# Patient Record
Sex: MALE | Race: WHITE | Employment: UNEMPLOYED | URBAN - METROPOLITAN AREA
[De-identification: names, ages, dates, MRNs, and addresses within clinical notes are randomized per-mention and may not be internally consistent; named-entity substitution may affect disease eponyms.]

---

## 2017-01-31 ENCOUNTER — TELEPHONE (OUTPATIENT)
Dept: FAMILY MEDICINE CLINIC | Facility: CLINIC | Age: 47
End: 2017-01-31

## 2017-01-31 ENCOUNTER — OFFICE VISIT (OUTPATIENT)
Dept: FAMILY MEDICINE CLINIC | Facility: CLINIC | Age: 47
End: 2017-01-31

## 2017-01-31 VITALS
HEART RATE: 76 BPM | DIASTOLIC BLOOD PRESSURE: 85 MMHG | HEIGHT: 67 IN | WEIGHT: 235 LBS | RESPIRATION RATE: 20 BRPM | TEMPERATURE: 98 F | BODY MASS INDEX: 36.88 KG/M2 | SYSTOLIC BLOOD PRESSURE: 125 MMHG

## 2017-01-31 DIAGNOSIS — R07.89 OTHER CHEST PAIN: ICD-10-CM

## 2017-01-31 DIAGNOSIS — M25.511 BILATERAL SHOULDER PAIN, UNSPECIFIED CHRONICITY: Primary | ICD-10-CM

## 2017-01-31 DIAGNOSIS — M25.512 BILATERAL SHOULDER PAIN, UNSPECIFIED CHRONICITY: Primary | ICD-10-CM

## 2017-01-31 PROCEDURE — 99215 OFFICE O/P EST HI 40 MIN: CPT | Performed by: FAMILY MEDICINE

## 2017-01-31 PROCEDURE — 99212 OFFICE O/P EST SF 10 MIN: CPT | Performed by: FAMILY MEDICINE

## 2017-01-31 RX ORDER — ESCITALOPRAM OXALATE 10 MG/1
10 TABLET ORAL DAILY
Qty: 90 TABLET | Refills: 1 | Status: SHIPPED | OUTPATIENT
Start: 2017-01-31 | End: 2017-07-29

## 2017-02-01 NOTE — TELEPHONE ENCOUNTER
Please call him again. Needs to do stress test., order in the computer.  Tell him i spoke to dr Johana Menard

## 2017-02-01 NOTE — PROGRESS NOTES
HPI:    Patient ID: Yahir Bethea is a 48year old male. HPI Comments: Patient also complains about chest pressure when under stress. He still smokes,   He is also under a lot of stress.        Shoulder Pain   The pain is present in the right shoul (SIX) HOURS AS NEEDED FOR WHEEZING. Disp: 8.5 Inhaler Rfl: 1   LISINOPRIL-HYDROCHLOROTHIAZIDE 10-12.5 MG Oral Tab TAKE 1 TABLET BY MOUTH ONCE DAILY.  Disp: 90 tablet Rfl: 1   ASPIRIN 81 MG Oral Tab EC TAKE 1 TABLET BY MOUTH EVERY DAY Disp: 90 tablet Rfl: 1 INTERNAL  CARD NUC EXERCISE STRESS (REST/EXER) ([id number])       #[id number]

## 2017-02-03 ENCOUNTER — HOSPITAL ENCOUNTER (OUTPATIENT)
Dept: NUCLEAR MEDICINE | Facility: HOSPITAL | Age: 47
Discharge: HOME | End: 2017-02-03
Attending: FAMILY MEDICINE
Payer: COMMERCIAL

## 2017-02-03 ENCOUNTER — HOSPITAL ENCOUNTER (OUTPATIENT)
Dept: CV DIAGNOSTICS | Facility: HOSPITAL | Age: 47
Discharge: HOME | End: 2017-02-03
Attending: FAMILY MEDICINE
Payer: COMMERCIAL

## 2017-02-03 ENCOUNTER — TELEPHONE (OUTPATIENT)
Dept: PULMONOLOGY | Facility: CLINIC | Age: 47
End: 2017-02-03

## 2017-02-03 DIAGNOSIS — R07.89 OTHER CHEST PAIN: ICD-10-CM

## 2017-02-03 PROCEDURE — 93018 CV STRESS TEST I&R ONLY: CPT | Performed by: INTERNAL MEDICINE

## 2017-02-03 PROCEDURE — 93016 CV STRESS TEST SUPVJ ONLY: CPT | Performed by: INTERNAL MEDICINE

## 2017-02-03 PROCEDURE — 78452 HT MUSCLE IMAGE SPECT MULT: CPT

## 2017-02-03 PROCEDURE — 93017 CV STRESS TEST TRACING ONLY: CPT

## 2017-02-03 PROCEDURE — 93018 CV STRESS TEST I&R ONLY: CPT | Performed by: NUCLEAR MEDICINE

## 2017-02-03 RX ORDER — SODIUM CHLORIDE 9 MG/ML
INJECTION, SOLUTION INTRAVENOUS
Status: COMPLETED
Start: 2017-02-03 | End: 2017-02-03

## 2017-02-03 RX ADMIN — SODIUM CHLORIDE: 9 INJECTION, SOLUTION INTRAVENOUS at 08:39:00

## 2017-02-08 ENCOUNTER — TELEPHONE (OUTPATIENT)
Dept: FAMILY MEDICINE CLINIC | Facility: CLINIC | Age: 47
End: 2017-02-08

## 2017-02-08 NOTE — TELEPHONE ENCOUNTER
----- Message from Manju Sneed MD sent at 2/3/2017  1:02 PM CST -----  Results normal. Please call patient and send results.  Continue present management

## 2017-02-10 ENCOUNTER — HOSPITAL ENCOUNTER (OUTPATIENT)
Dept: GENERAL RADIOLOGY | Facility: HOSPITAL | Age: 47
Discharge: HOME | End: 2017-02-10
Attending: ORTHOPAEDIC SURGERY
Payer: COMMERCIAL

## 2017-02-10 ENCOUNTER — OFFICE VISIT (OUTPATIENT)
Dept: ORTHOPEDICS CLINIC | Facility: CLINIC | Age: 47
End: 2017-02-10

## 2017-02-10 DIAGNOSIS — M25.511 BILATERAL SHOULDER PAIN, UNSPECIFIED CHRONICITY: ICD-10-CM

## 2017-02-10 DIAGNOSIS — M54.2 NECK PAIN: ICD-10-CM

## 2017-02-10 DIAGNOSIS — M75.22 BICEPS TENDONITIS, LEFT: Primary | ICD-10-CM

## 2017-02-10 DIAGNOSIS — M25.512 BILATERAL SHOULDER PAIN, UNSPECIFIED CHRONICITY: ICD-10-CM

## 2017-02-10 DIAGNOSIS — M75.82 ROTATOR CUFF TENDONITIS, LEFT: ICD-10-CM

## 2017-02-10 PROCEDURE — 72040 X-RAY EXAM NECK SPINE 2-3 VW: CPT

## 2017-02-10 PROCEDURE — 99212 OFFICE O/P EST SF 10 MIN: CPT | Performed by: ORTHOPAEDIC SURGERY

## 2017-02-10 PROCEDURE — 99243 OFF/OP CNSLTJ NEW/EST LOW 30: CPT | Performed by: ORTHOPAEDIC SURGERY

## 2017-02-10 PROCEDURE — 73030 X-RAY EXAM OF SHOULDER: CPT

## 2017-02-10 NOTE — PROGRESS NOTES
2/10/2017  Robert Yin  34/1970  50year old   male  Letha Cruz MD    HPI:   Patient presents with:  Shoulder Pain: Bilateral, left worse- pt states pain has been ongoing for yrs, but most recently 1 month ago.  pt states he has pain at [facility] Disp: 90 tablet Rfl: 3   alprazolam 1 MG Oral Tab TAKE 1 TABLET BY MOUTH EVERY EVENING AS NEEDED FOR SLEEP Disp: 30 tablet Rfl: 2   Ascorbic Acid (VITAMIN C) 1000 MG Oral Tab Take 1,000 mg by mouth daily.  Disp:  Rfl:    omega-3 fatty acids 1000 MG Oral Cap pulses. Sensation is intact to light touch in axillary, median, ulnar, and radial nerve distributions. The patient has 5/5 strength in finger flexion, finger extension, EPL, FPL, and interosseous muscle function.   The patient has full forward elevation a

## 2017-02-15 RX ORDER — METOPROLOL TARTRATE 50 MG/1
TABLET, FILM COATED ORAL
Qty: 180 TABLET | Refills: 0 | Status: SHIPPED | OUTPATIENT
Start: 2017-02-15 | End: 2017-05-15

## 2017-02-22 ENCOUNTER — OFFICE VISIT (OUTPATIENT)
Dept: PHYSICAL THERAPY | Facility: HOSPITAL | Age: 47
End: 2017-02-22
Attending: ORTHOPAEDIC SURGERY
Payer: COMMERCIAL

## 2017-02-22 DIAGNOSIS — M75.82 ROTATOR CUFF TENDONITIS, LEFT: ICD-10-CM

## 2017-02-22 DIAGNOSIS — M75.22 BICEPS TENDONITIS, LEFT: Primary | ICD-10-CM

## 2017-02-22 PROCEDURE — 97110 THERAPEUTIC EXERCISES: CPT

## 2017-02-22 PROCEDURE — 97162 PT EVAL MOD COMPLEX 30 MIN: CPT

## 2017-02-22 NOTE — PROGRESS NOTES
UPPER EXTREMITY EVALUATION:   Referring Physician: Dr. Jacquelyn Wick  Date of Onset: 4-6 wks ago Date of Service: 2/22/2017   Associated DX:  Biceps tendonitis, left (M75.22)  Rotator cuff tendonitis, left (M75.82)    PATIENT SUMMARY:   Jeanne marin a Screen: B rot 80% WNL, ext 50% WNL, flex WNL, B SB 80% WNL. Palpation: significant TTP L ant shoulder supraspinatus/bicep tendons, posterior R/C, and pect major/minor. Scapulohumeral Rhythm: poor stabilization L scapula with all movements.      AROM: will increase shoulder AROM to 85 deg ER  To promote functional ADL. · Pt will increase shoulder AROM IR to 70 deg to be able to reach in back pocket, tuck in shirt, and turn steering wheel without pain.   · Pt will improve shoulder strength for ER and Ab

## 2017-02-28 ENCOUNTER — OFFICE VISIT (OUTPATIENT)
Dept: PHYSICAL THERAPY | Facility: HOSPITAL | Age: 47
End: 2017-02-28
Attending: ORTHOPAEDIC SURGERY
Payer: COMMERCIAL

## 2017-02-28 DIAGNOSIS — M75.82 ROTATOR CUFF TENDONITIS, LEFT: ICD-10-CM

## 2017-02-28 DIAGNOSIS — M75.22 BICEPS TENDONITIS, LEFT: Primary | ICD-10-CM

## 2017-02-28 PROCEDURE — 97110 THERAPEUTIC EXERCISES: CPT

## 2017-02-28 NOTE — PROGRESS NOTES
DX:  Biceps tendonitis, left (M75.22)  Rotator cuff tendonitis, left (M75.82)  Authorized # of Visits:  2/8 (jarocho 5/15/17)         Next MD visit: none scheduled  Fall Risk: standard         Precautions: n/a           Medication Changes since last visit?: N

## 2017-03-02 ENCOUNTER — APPOINTMENT (OUTPATIENT)
Dept: PHYSICAL THERAPY | Facility: HOSPITAL | Age: 47
End: 2017-03-02
Attending: ORTHOPAEDIC SURGERY
Payer: COMMERCIAL

## 2017-03-07 ENCOUNTER — APPOINTMENT (OUTPATIENT)
Dept: PHYSICAL THERAPY | Facility: HOSPITAL | Age: 47
End: 2017-03-07
Attending: ORTHOPAEDIC SURGERY
Payer: COMMERCIAL

## 2017-03-09 ENCOUNTER — APPOINTMENT (OUTPATIENT)
Dept: PHYSICAL THERAPY | Facility: HOSPITAL | Age: 47
End: 2017-03-09
Attending: ORTHOPAEDIC SURGERY
Payer: COMMERCIAL

## 2017-03-13 ENCOUNTER — APPOINTMENT (OUTPATIENT)
Dept: PHYSICAL THERAPY | Facility: HOSPITAL | Age: 47
End: 2017-03-13
Attending: ORTHOPAEDIC SURGERY
Payer: COMMERCIAL

## 2017-03-16 ENCOUNTER — APPOINTMENT (OUTPATIENT)
Dept: PHYSICAL THERAPY | Facility: HOSPITAL | Age: 47
End: 2017-03-16
Attending: ORTHOPAEDIC SURGERY
Payer: COMMERCIAL

## 2017-03-20 ENCOUNTER — APPOINTMENT (OUTPATIENT)
Dept: PHYSICAL THERAPY | Facility: HOSPITAL | Age: 47
End: 2017-03-20
Attending: ORTHOPAEDIC SURGERY
Payer: COMMERCIAL

## 2017-03-22 ENCOUNTER — APPOINTMENT (OUTPATIENT)
Dept: PHYSICAL THERAPY | Facility: HOSPITAL | Age: 47
End: 2017-03-22
Attending: ORTHOPAEDIC SURGERY
Payer: COMMERCIAL

## 2017-04-12 ENCOUNTER — TELEPHONE (OUTPATIENT)
Dept: CARDIOLOGY CLINIC | Facility: CLINIC | Age: 47
End: 2017-04-12

## 2017-04-12 NOTE — TELEPHONE ENCOUNTER
Pt needs surgical clearance for upcoming shoulder surgery at [address] [place] [address] - he had recent stress test done - Dr Nasima Nieto -phone  [phone]--wax [phone]

## 2017-04-18 NOTE — TELEPHONE ENCOUNTER
Pt is requesting a refill on PROAIR  (90 Base) MCG/ACT Inhalation Aero Soln  Pt will be going into surgery in the next week and is requesting this refill.     Current outpatient prescriptions:   •  PROAIR  (90 Base) MCG/ACT Inhalation Aero Sol

## 2017-04-19 RX ORDER — ALBUTEROL SULFATE 90 UG/1
2 AEROSOL, METERED RESPIRATORY (INHALATION) EVERY 6 HOURS PRN
Qty: 8.5 INHALER | Refills: 1 | Status: SHIPPED | OUTPATIENT
Start: 2017-04-19 | End: 2017-06-15

## 2017-04-27 RX ORDER — LISINOPRIL AND HYDROCHLOROTHIAZIDE 12.5; 1 MG/1; MG/1
TABLET ORAL
Qty: 90 TABLET | Refills: 0 | Status: SHIPPED | OUTPATIENT
Start: 2017-04-27 | End: 2017-07-23

## 2017-04-27 RX ORDER — ASPIRIN 81 MG/1
TABLET ORAL
Qty: 90 TABLET | Refills: 0 | Status: SHIPPED | OUTPATIENT
Start: 2017-04-27 | End: 2017-07-23

## 2017-04-27 NOTE — TELEPHONE ENCOUNTER
Hypertensive Medications  Protocol Criteria:  · Appointment scheduled in the past 6 months or in the next 3 months  · BMP or CMP in the past 12 months  · Creatinine result < 2  Recent Visits       Provider Department Primary Dx    2 months ago Jennifer Dockery

## 2017-05-02 ENCOUNTER — SURGERY (OUTPATIENT)
Age: 47
End: 2017-05-02

## 2017-05-02 ENCOUNTER — ANESTHESIA (OUTPATIENT)
Dept: SURGERY | Facility: HOSPITAL | Age: 47
End: 2017-05-02
Payer: OTHER MISCELLANEOUS

## 2017-05-02 ENCOUNTER — HOSPITAL ENCOUNTER (OUTPATIENT)
Facility: HOSPITAL | Age: 47
Setting detail: HOSPITAL OUTPATIENT SURGERY
Discharge: HOME | End: 2017-05-02
Attending: ORTHOPAEDIC SURGERY | Admitting: ORTHOPAEDIC SURGERY
Payer: OTHER MISCELLANEOUS

## 2017-05-02 ENCOUNTER — ANESTHESIA EVENT (OUTPATIENT)
Dept: SURGERY | Facility: HOSPITAL | Age: 47
End: 2017-05-02
Payer: OTHER MISCELLANEOUS

## 2017-05-02 VITALS
RESPIRATION RATE: 20 BRPM | SYSTOLIC BLOOD PRESSURE: 145 MMHG | TEMPERATURE: 97 F | WEIGHT: 244 LBS | HEART RATE: 71 BPM | HEIGHT: 67 IN | OXYGEN SATURATION: 91 % | DIASTOLIC BLOOD PRESSURE: 71 MMHG | BODY MASS INDEX: 38.3 KG/M2

## 2017-05-02 DIAGNOSIS — M75.122 COMPLETE TEAR OF LEFT ROTATOR CUFF: Primary | ICD-10-CM

## 2017-05-02 PROCEDURE — 64415 NJX AA&/STRD BRCH PLXS IMG: CPT | Performed by: ORTHOPAEDIC SURGERY

## 2017-05-02 PROCEDURE — 3E0T3BZ INTRODUCTION OF ANESTHETIC AGENT INTO PERIPHERAL NERVES AND PLEXI, PERCUTANEOUS APPROACH: ICD-10-PCS | Performed by: ANESTHESIOLOGY

## 2017-05-02 PROCEDURE — 0RNK4ZZ RELEASE LEFT SHOULDER JOINT, PERCUTANEOUS ENDOSCOPIC APPROACH: ICD-10-PCS | Performed by: ORTHOPAEDIC SURGERY

## 2017-05-02 PROCEDURE — 0RBK4ZZ EXCISION OF LEFT SHOULDER JOINT, PERCUTANEOUS ENDOSCOPIC APPROACH: ICD-10-PCS | Performed by: ORTHOPAEDIC SURGERY

## 2017-05-02 PROCEDURE — 0LQ24ZZ REPAIR LEFT SHOULDER TENDON, PERCUTANEOUS ENDOSCOPIC APPROACH: ICD-10-PCS | Performed by: ORTHOPAEDIC SURGERY

## 2017-05-02 PROCEDURE — 99152 MOD SED SAME PHYS/QHP 5/>YRS: CPT | Performed by: ORTHOPAEDIC SURGERY

## 2017-05-02 DEVICE — ANCHOR CORK METAL AR-1928SF-45: Type: IMPLANTABLE DEVICE | Site: SHOULDER | Status: FUNCTIONAL

## 2017-05-02 RX ORDER — METOCLOPRAMIDE 10 MG/1
10 TABLET ORAL ONCE
Status: DISCONTINUED | OUTPATIENT
Start: 2017-05-02 | End: 2017-05-02 | Stop reason: HOSPADM

## 2017-05-02 RX ORDER — LIDOCAINE HYDROCHLORIDE 10 MG/ML
INJECTION, SOLUTION EPIDURAL; INFILTRATION; INTRACAUDAL; PERINEURAL AS NEEDED
Status: DISCONTINUED | OUTPATIENT
Start: 2017-05-02 | End: 2017-05-02 | Stop reason: SURG

## 2017-05-02 RX ORDER — ROPIVACAINE HYDROCHLORIDE 5 MG/ML
INJECTION, SOLUTION EPIDURAL; INFILTRATION; PERINEURAL AS NEEDED
Status: DISCONTINUED | OUTPATIENT
Start: 2017-05-02 | End: 2017-05-02 | Stop reason: SURG

## 2017-05-02 RX ORDER — MORPHINE SULFATE 4 MG/ML
4 INJECTION, SOLUTION INTRAMUSCULAR; INTRAVENOUS EVERY 10 MIN PRN
Status: DISCONTINUED | OUTPATIENT
Start: 2017-05-02 | End: 2017-05-02

## 2017-05-02 RX ORDER — ROCURONIUM BROMIDE 10 MG/ML
INJECTION, SOLUTION INTRAVENOUS AS NEEDED
Status: DISCONTINUED | OUTPATIENT
Start: 2017-05-02 | End: 2017-05-02 | Stop reason: SURG

## 2017-05-02 RX ORDER — HYDROCODONE BITARTRATE AND ACETAMINOPHEN 5; 325 MG/1; MG/1
2 TABLET ORAL AS NEEDED
Status: DISCONTINUED | OUTPATIENT
Start: 2017-05-02 | End: 2017-05-02

## 2017-05-02 RX ORDER — HYDROMORPHONE HYDROCHLORIDE 1 MG/ML
0.4 INJECTION, SOLUTION INTRAMUSCULAR; INTRAVENOUS; SUBCUTANEOUS EVERY 5 MIN PRN
Status: DISCONTINUED | OUTPATIENT
Start: 2017-05-02 | End: 2017-05-02

## 2017-05-02 RX ORDER — GLYCOPYRROLATE 0.2 MG/ML
INJECTION INTRAMUSCULAR; INTRAVENOUS AS NEEDED
Status: DISCONTINUED | OUTPATIENT
Start: 2017-05-02 | End: 2017-05-02 | Stop reason: SURG

## 2017-05-02 RX ORDER — FAMOTIDINE 20 MG/1
20 TABLET ORAL ONCE
Status: COMPLETED | OUTPATIENT
Start: 2017-05-02 | End: 2017-05-02

## 2017-05-02 RX ORDER — SUCCINYLCHOLINE CHLORIDE 20 MG/ML
INJECTION INTRAMUSCULAR; INTRAVENOUS AS NEEDED
Status: DISCONTINUED | OUTPATIENT
Start: 2017-05-02 | End: 2017-05-02 | Stop reason: SURG

## 2017-05-02 RX ORDER — HYDROMORPHONE HYDROCHLORIDE 1 MG/ML
0.2 INJECTION, SOLUTION INTRAMUSCULAR; INTRAVENOUS; SUBCUTANEOUS EVERY 5 MIN PRN
Status: DISCONTINUED | OUTPATIENT
Start: 2017-05-02 | End: 2017-05-02

## 2017-05-02 RX ORDER — SODIUM CHLORIDE, SODIUM LACTATE, POTASSIUM CHLORIDE, CALCIUM CHLORIDE 600; 310; 30; 20 MG/100ML; MG/100ML; MG/100ML; MG/100ML
INJECTION, SOLUTION INTRAVENOUS CONTINUOUS
Status: DISCONTINUED | OUTPATIENT
Start: 2017-05-02 | End: 2017-05-02

## 2017-05-02 RX ORDER — ACETAMINOPHEN 325 MG/1
650 TABLET ORAL ONCE
Status: COMPLETED | OUTPATIENT
Start: 2017-05-02 | End: 2017-05-02

## 2017-05-02 RX ORDER — SODIUM CHLORIDE, SODIUM LACTATE, POTASSIUM CHLORIDE, CALCIUM CHLORIDE 600; 310; 30; 20 MG/100ML; MG/100ML; MG/100ML; MG/100ML
INJECTION, SOLUTION INTRAVENOUS CONTINUOUS PRN
Status: DISCONTINUED | OUTPATIENT
Start: 2017-05-02 | End: 2017-05-02 | Stop reason: SURG

## 2017-05-02 RX ORDER — OXYCODONE HYDROCHLORIDE AND ACETAMINOPHEN 5; 325 MG/1; MG/1
1 TABLET ORAL EVERY 4 HOURS PRN
Status: DISCONTINUED | OUTPATIENT
Start: 2017-05-02 | End: 2017-05-02

## 2017-05-02 RX ORDER — MORPHINE SULFATE 10 MG/ML
6 INJECTION, SOLUTION INTRAMUSCULAR; INTRAVENOUS EVERY 10 MIN PRN
Status: DISCONTINUED | OUTPATIENT
Start: 2017-05-02 | End: 2017-05-02

## 2017-05-02 RX ORDER — ONDANSETRON 2 MG/ML
4 INJECTION INTRAMUSCULAR; INTRAVENOUS ONCE AS NEEDED
Status: DISCONTINUED | OUTPATIENT
Start: 2017-05-02 | End: 2017-05-02

## 2017-05-02 RX ORDER — ONDANSETRON 2 MG/ML
INJECTION INTRAMUSCULAR; INTRAVENOUS AS NEEDED
Status: DISCONTINUED | OUTPATIENT
Start: 2017-05-02 | End: 2017-05-02 | Stop reason: SURG

## 2017-05-02 RX ORDER — LABETALOL HYDROCHLORIDE 5 MG/ML
INJECTION, SOLUTION INTRAVENOUS AS NEEDED
Status: DISCONTINUED | OUTPATIENT
Start: 2017-05-02 | End: 2017-05-02 | Stop reason: SURG

## 2017-05-02 RX ORDER — DEXAMETHASONE SODIUM PHOSPHATE 4 MG/ML
VIAL (ML) INJECTION AS NEEDED
Status: DISCONTINUED | OUTPATIENT
Start: 2017-05-02 | End: 2017-05-02 | Stop reason: SURG

## 2017-05-02 RX ORDER — NEOSTIGMINE METHYLSULFATE 0.5 MG/ML
INJECTION INTRAVENOUS AS NEEDED
Status: DISCONTINUED | OUTPATIENT
Start: 2017-05-02 | End: 2017-05-02 | Stop reason: SURG

## 2017-05-02 RX ORDER — METOPROLOL TARTRATE 5 MG/5ML
2.5 INJECTION INTRAVENOUS ONCE
Status: DISCONTINUED | OUTPATIENT
Start: 2017-05-02 | End: 2017-05-02

## 2017-05-02 RX ORDER — HYDROCODONE BITARTRATE AND ACETAMINOPHEN 5; 325 MG/1; MG/1
1 TABLET ORAL AS NEEDED
Status: DISCONTINUED | OUTPATIENT
Start: 2017-05-02 | End: 2017-05-02

## 2017-05-02 RX ORDER — HYDROMORPHONE HYDROCHLORIDE 1 MG/ML
0.6 INJECTION, SOLUTION INTRAMUSCULAR; INTRAVENOUS; SUBCUTANEOUS EVERY 5 MIN PRN
Status: DISCONTINUED | OUTPATIENT
Start: 2017-05-02 | End: 2017-05-02

## 2017-05-02 RX ORDER — MORPHINE SULFATE 2 MG/ML
2 INJECTION, SOLUTION INTRAMUSCULAR; INTRAVENOUS EVERY 10 MIN PRN
Status: DISCONTINUED | OUTPATIENT
Start: 2017-05-02 | End: 2017-05-02

## 2017-05-02 RX ORDER — NALOXONE HYDROCHLORIDE 0.4 MG/ML
80 INJECTION, SOLUTION INTRAMUSCULAR; INTRAVENOUS; SUBCUTANEOUS AS NEEDED
Status: DISCONTINUED | OUTPATIENT
Start: 2017-05-02 | End: 2017-05-02

## 2017-05-02 RX ADMIN — GLYCOPYRROLATE 0.6 MG: 0.2 INJECTION INTRAMUSCULAR; INTRAVENOUS at 14:05:00

## 2017-05-02 RX ADMIN — ONDANSETRON 4 MG: 2 INJECTION INTRAMUSCULAR; INTRAVENOUS at 14:05:00

## 2017-05-02 RX ADMIN — LIDOCAINE HYDROCHLORIDE 50 MG: 10 INJECTION, SOLUTION EPIDURAL; INFILTRATION; INTRACAUDAL; PERINEURAL at 13:20:00

## 2017-05-02 RX ADMIN — ROCURONIUM BROMIDE 25 MG: 10 INJECTION, SOLUTION INTRAVENOUS at 13:30:00

## 2017-05-02 RX ADMIN — ROPIVACAINE HYDROCHLORIDE 30 ML: 5 INJECTION, SOLUTION EPIDURAL; INFILTRATION; PERINEURAL at 12:22:00

## 2017-05-02 RX ADMIN — ROCURONIUM BROMIDE 5 MG: 10 INJECTION, SOLUTION INTRAVENOUS at 13:20:00

## 2017-05-02 RX ADMIN — LABETALOL HYDROCHLORIDE 5 MG: 5 INJECTION, SOLUTION INTRAVENOUS at 13:55:00

## 2017-05-02 RX ADMIN — DEXAMETHASONE SODIUM PHOSPHATE 4 MG: 4 MG/ML VIAL (ML) INJECTION at 13:34:00

## 2017-05-02 RX ADMIN — SUCCINYLCHOLINE CHLORIDE 160 MG: 20 INJECTION INTRAMUSCULAR; INTRAVENOUS at 13:20:00

## 2017-05-02 RX ADMIN — SODIUM CHLORIDE, SODIUM LACTATE, POTASSIUM CHLORIDE, CALCIUM CHLORIDE: 600; 310; 30; 20 INJECTION, SOLUTION INTRAVENOUS at 14:10:00

## 2017-05-02 RX ADMIN — LABETALOL HYDROCHLORIDE 5 MG: 5 INJECTION, SOLUTION INTRAVENOUS at 13:45:00

## 2017-05-02 RX ADMIN — SODIUM CHLORIDE, SODIUM LACTATE, POTASSIUM CHLORIDE, CALCIUM CHLORIDE: 600; 310; 30; 20 INJECTION, SOLUTION INTRAVENOUS at 13:45:00

## 2017-05-02 RX ADMIN — SODIUM CHLORIDE, SODIUM LACTATE, POTASSIUM CHLORIDE, CALCIUM CHLORIDE: 600; 310; 30; 20 INJECTION, SOLUTION INTRAVENOUS at 12:22:00

## 2017-05-02 RX ADMIN — LIDOCAINE HYDROCHLORIDE 5 ML: 10 INJECTION, SOLUTION EPIDURAL; INFILTRATION; INTRACAUDAL; PERINEURAL at 12:22:00

## 2017-05-02 RX ADMIN — NEOSTIGMINE METHYLSULFATE 4 MG: 0.5 INJECTION INTRAVENOUS at 14:05:00

## 2017-05-02 NOTE — ANESTHESIA POSTPROCEDURE EVALUATION
Patient: Janak Goodrich    Procedure Summary     Date Anesthesia Start Anesthesia Stop Room / Location    05/02/17 1318  [facility] OR 03 / [facility] OR       Procedure Diagnosis Surgeon Responsible Provider    SHOULDER ARTHROSCOPY ROTATOR CUFF REPAIR (Lef

## 2017-05-02 NOTE — ANESTHESIA PREPROCEDURE EVALUATION
Anesthesia PreOp Note    HPI:     Haritha Milner is a 50year old male who presents for preoperative consultation requested by:  Leonardo Obrien MD    Date of Surgery: 5/2/2017    Procedure(s):  SHOULDER ARTHROSCOPY ROTATOR CUFF REPAIR  Indication: Left shoul mouth daily. Disp: 90 tablet Rfl: 1 4/30/2017 at Unknown time   Atorvastatin Calcium 80 MG Oral Tab Take 1 tablet (80 mg total) by mouth once daily.  Disp: 90 tablet Rfl: 3 5/1/2017 at 2100   alprazolam 1 MG Oral Tab TAKE 1 TABLET BY MOUTH EVERY EVENING AS Narrative       Available pre-op labs reviewed. Vital Signs: Body mass index is 38.21 kg/(m^2). height is 5' 7\" and weight is 244 lb. His oral temperature is 98.6 °F (37 °C). His blood pressure is 130/69 and his pulse is 66.  His respiration

## 2017-05-02 NOTE — H&P
[address] Patient Status:  [facility] Outpatient Surgery    1970 MRN [id number]   Location [facility] UNIT Attending Cristian Montano MD   Hosp Day # 0 MOIRA Morgan (10 mg total) by mouth daily. Disp: 90 tablet Rfl: 1 4/30/2017 at Unknown time   Atorvastatin Calcium 80 MG Oral Tab Take 1 tablet (80 mg total) by mouth once daily.  Disp: 90 tablet Rfl: 3 5/1/2017 at 2100   alprazolam 1 MG Oral Tab TAKE 1 TABLET BY MOUTH

## 2017-05-02 NOTE — ANESTHESIA PROCEDURE NOTES
Peripheral Block    Anesthesiologist:  Zunilda Bland  Patient Location:  PACU  Start Time:  5/2/2017 12:22 PM  End Time:  5/2/2017 12:25 PM  Site Identification: nerve stimulator and surface landmarks    Reason for Block: post-op pain management    Prea

## 2017-05-02 NOTE — BRIEF OP NOTE
Pre-Operative Diagnosis: Left shoulder rotator cuff tear     Post-Operative Diagnosis: Left shoulder rotator cuff, Impingement, Labral tearing, synovitis, biceps tendonitis     Procedure Performed:   Procedure(s):  Left shoulder arthroscopy rotator cuff

## 2017-05-04 NOTE — OPERATIVE REPORT
[facility]    PATIENT'S NAME: Arthur Benitez   ATTENDING PHYSICIAN: [address] Sherri Gonzalez MD   OPERATING PHYSICIAN: Chavo Gordon.  Sherri Gonzalez MD   PATIENT ACCOUNT#:   [id number]    LOCATION:  [facility] 10  MEDICAL RECORD #:   [id number]       DATE OF BIRTH and I confirmed that the left shoulder was the operative site and the left shoulder was marked accordingly. IV antibiotics were administered to the patient for prophylactic purposes.   An interscalene block was obtained by the anesthesiologist without diff decided to perform a tenotomy as this was a potential pain generator. Using an ArthroCare instrument, we released the proximal biceps tendon from the glenoid. We debrided the stump until all nonviable tissue was excised.     Next, the arthroscope was intr 05:22:40  [facility] [id number]  KCT/

## 2017-05-10 ENCOUNTER — TELEPHONE (OUTPATIENT)
Dept: FAMILY MEDICINE CLINIC | Facility: CLINIC | Age: 47
End: 2017-05-10

## 2017-05-10 DIAGNOSIS — G47.30 SLEEP APNEA, UNSPECIFIED TYPE: Primary | ICD-10-CM

## 2017-05-18 ENCOUNTER — TELEPHONE (OUTPATIENT)
Dept: FAMILY MEDICINE CLINIC | Facility: CLINIC | Age: 47
End: 2017-05-18

## 2017-05-18 RX ORDER — METOPROLOL TARTRATE 50 MG/1
TABLET, FILM COATED ORAL
Qty: 180 TABLET | Refills: 0 | Status: SHIPPED | OUTPATIENT
Start: 2017-05-18 | End: 2017-08-17

## 2017-05-18 RX ORDER — ALPRAZOLAM 1 MG/1
TABLET ORAL
Qty: 30 TABLET | Refills: 2 | Status: SHIPPED | OUTPATIENT
Start: 2017-05-18 | End: 2017-05-22

## 2017-05-18 NOTE — TELEPHONE ENCOUNTER
Pt calling regarding wanting to let Dr. Steve Abdalla know that had surgery on his rotating cup and is doing physical therapy for workers comp. Chante Rodriguez please advise

## 2017-05-18 NOTE — TELEPHONE ENCOUNTER
Pt following up on refill request,  Also pt is requesting alprazolam 1 MG Oral Tab      Current Outpatient Prescriptions:  alprazolam 1 MG Oral Tab TAKE 1 TABLET BY MOUTH EVERY EVENING AS NEEDED FOR SLEEP Disp: 30 tablet Rfl: 2

## 2017-05-18 NOTE — TELEPHONE ENCOUNTER
Pt called in just as an FYI (you don't need to do anything) he had surgery on his rotator cuff and is in PT.  I spoke with patient, he is doing fine and he confirmed he just wanted to tell you

## 2017-05-22 RX ORDER — ALPRAZOLAM 1 MG/1
TABLET ORAL
Qty: 30 TABLET | Refills: 1 | Status: SHIPPED | OUTPATIENT
Start: 2017-05-22 | End: 2018-03-20

## 2017-06-14 ENCOUNTER — TELEPHONE (OUTPATIENT)
Dept: FAMILY MEDICINE CLINIC | Facility: CLINIC | Age: 47
End: 2017-06-14

## 2017-06-14 DIAGNOSIS — G47.33 OBSTRUCTIVE SLEEP APNEA: Primary | ICD-10-CM

## 2017-06-14 NOTE — TELEPHONE ENCOUNTER
Pt called in stating E has been faxing Dr. Howard Lezama office for the last 2 weeks trying to obtain a referral to replace pt's cpap supplies (face mask, hose, filters, etc). Pt asking if a referral can be generated and sent to E, please?   Pt requesting a ca

## 2017-06-14 NOTE — TELEPHONE ENCOUNTER
Pt called in requesting a refill on his inhaler, please. Current outpatient prescriptions:     •  Albuterol Sulfate HFA (PROAIR HFA) 108 (90 Base) MCG/ACT Inhalation Aero Soln, Inhale 2 puffs into the lungs every 6 (six) hours as needed.  For wheezing, D

## 2017-06-15 ENCOUNTER — TELEPHONE (OUTPATIENT)
Dept: FAMILY MEDICINE CLINIC | Facility: CLINIC | Age: 47
End: 2017-06-15

## 2017-06-16 RX ORDER — ALBUTEROL SULFATE 90 UG/1
2 AEROSOL, METERED RESPIRATORY (INHALATION) EVERY 6 HOURS PRN
Qty: 8.5 INHALER | Refills: 1 | Status: SHIPPED | OUTPATIENT
Start: 2017-06-16 | End: 2017-10-29

## 2017-06-21 NOTE — TELEPHONE ENCOUNTER
Mukund Pimentel from [address] [place] [address] called in to follow up on referral for cpap supplies. Mukund Pimentel supplied diagnosis codes: [place], [id number], [id number], [id number], [id number] & .   Fax # [phone]

## 2017-07-03 NOTE — TELEPHONE ENCOUNTER
Pt calling checking status of referral, pt states he has a whole in hose and need referral ASAP. Pt states he needs referral ASAP. Pt requesting call when referral approved.

## 2017-07-05 NOTE — TELEPHONE ENCOUNTER
Referral was sent over 7/3 to [facility] as a routine referral. Our department was closed on 7/4. It was sent over urgently to  [facility] for authorization  today 7/5/17.     Thank You

## 2017-07-25 RX ORDER — ASPIRIN 81 MG/1
TABLET ORAL
Qty: 90 TABLET | Refills: 0 | Status: SHIPPED | OUTPATIENT
Start: 2017-07-25 | End: 2017-10-26

## 2017-07-25 RX ORDER — LISINOPRIL AND HYDROCHLOROTHIAZIDE 12.5; 1 MG/1; MG/1
TABLET ORAL
Qty: 90 TABLET | Refills: 0 | Status: SHIPPED | OUTPATIENT
Start: 2017-07-25 | End: 2017-10-26

## 2017-07-25 NOTE — TELEPHONE ENCOUNTER
Refill protocol failed because the patient did not meet the protocol criteria.  Please advise in regards to refill request     Hypertensive Medications  Protocol Criteria:  · Appointment scheduled in the past 6 months or in the next 3 months  · BMP or CMP i Office Visit    5 months ago Biceps tendonitis, left    [facility] for Sully Collins MD    Office Visit    5 months ago Bilateral shoulder pain, unspecified chronicity    [facility], [facility] [place],

## 2017-07-31 RX ORDER — ESCITALOPRAM OXALATE 10 MG/1
10 TABLET ORAL DAILY
Qty: 90 TABLET | Refills: 1 | Status: SHIPPED | OUTPATIENT
Start: 2017-07-31 | End: 2018-01-27

## 2017-08-15 ENCOUNTER — OFFICE VISIT (OUTPATIENT)
Dept: FAMILY MEDICINE CLINIC | Facility: CLINIC | Age: 47
End: 2017-08-15

## 2017-08-15 VITALS
SYSTOLIC BLOOD PRESSURE: 139 MMHG | WEIGHT: 245 LBS | TEMPERATURE: 98 F | DIASTOLIC BLOOD PRESSURE: 82 MMHG | HEART RATE: 67 BPM | BODY MASS INDEX: 38 KG/M2

## 2017-08-15 DIAGNOSIS — E66.3 OVERWEIGHT: ICD-10-CM

## 2017-08-15 DIAGNOSIS — I51.9 HEART DISEASE: Primary | ICD-10-CM

## 2017-08-15 PROCEDURE — 99214 OFFICE O/P EST MOD 30 MIN: CPT | Performed by: FAMILY MEDICINE

## 2017-08-15 PROCEDURE — 99212 OFFICE O/P EST SF 10 MIN: CPT | Performed by: FAMILY MEDICINE

## 2017-08-15 RX ORDER — BUPROPION HYDROCHLORIDE 150 MG/1
TABLET, EXTENDED RELEASE ORAL
Qty: 60 TABLET | Refills: 2 | Status: SHIPPED | OUTPATIENT
Start: 2017-08-15 | End: 2017-11-30

## 2017-08-15 NOTE — PROGRESS NOTES
HPI:    Patient ID: Yahir Bethea is a 50year old male. Fatigue, sweating more, was not working for last 4 months. Gained weight. No chest pain, no short of breath. Does not feel motivated. This all since shoulder surgery.  Doing pysical the 1,000 mg by mouth daily. Disp:  Rfl:    omega-3 fatty acids 1000 MG Oral Cap Take 1,000 mg by mouth daily. Disp:  Rfl:    Multiple Vitamin (MULTI-VITAMINS) Oral Tab Take  by mouth.  Disp:  Rfl:      Allergies:  Neosporin [Neomycin*    Rash   PHYSICAL EXAM:

## 2017-08-18 ENCOUNTER — LAB ENCOUNTER (OUTPATIENT)
Dept: LAB | Age: 47
End: 2017-08-18
Attending: FAMILY MEDICINE
Payer: COMMERCIAL

## 2017-08-18 DIAGNOSIS — I51.9 HEART DISEASE: ICD-10-CM

## 2017-08-18 DIAGNOSIS — E66.3 OVERWEIGHT: ICD-10-CM

## 2017-08-18 LAB
ALBUMIN SERPL BCP-MCNC: 4.3 G/DL (ref 3.5–4.8)
ALBUMIN/GLOB SERPL: 1.7 {RATIO} (ref 1–2)
ALP SERPL-CCNC: 100 U/L (ref 32–100)
ALT SERPL-CCNC: 33 U/L (ref 17–63)
ANION GAP SERPL CALC-SCNC: 8 MMOL/L (ref 0–18)
AST SERPL-CCNC: 22 U/L (ref 15–41)
BASOPHILS # BLD: 0 K/UL (ref 0–0.2)
BASOPHILS NFR BLD: 1 %
BILIRUB SERPL-MCNC: 0.7 MG/DL (ref 0.3–1.2)
BUN SERPL-MCNC: 18 MG/DL (ref 8–20)
BUN/CREAT SERPL: 18.8 (ref 10–20)
CALCIUM SERPL-MCNC: 9.7 MG/DL (ref 8.5–10.5)
CHLORIDE SERPL-SCNC: 100 MMOL/L (ref 95–110)
CHOLEST SERPL-MCNC: 141 MG/DL (ref 110–200)
CO2 SERPL-SCNC: 29 MMOL/L (ref 22–32)
CREAT SERPL-MCNC: 0.96 MG/DL (ref 0.5–1.5)
EOSINOPHIL # BLD: 0.4 K/UL (ref 0–0.7)
EOSINOPHIL NFR BLD: 5 %
ERYTHROCYTE [DISTWIDTH] IN BLOOD BY AUTOMATED COUNT: 14.1 % (ref 11–15)
GLOBULIN PLAS-MCNC: 2.5 G/DL (ref 2.5–3.7)
GLUCOSE SERPL-MCNC: 97 MG/DL (ref 70–99)
HBA1C MFR BLD: 5.7 % (ref 4–6)
HCT VFR BLD AUTO: 43.6 % (ref 41–52)
HDLC SERPL-MCNC: 39 MG/DL
HGB BLD-MCNC: 15.1 G/DL (ref 13.5–17.5)
LDLC SERPL CALC-MCNC: 25 MG/DL (ref 0–99)
LYMPHOCYTES # BLD: 2.5 K/UL (ref 1–4)
LYMPHOCYTES NFR BLD: 26 %
MCH RBC QN AUTO: 30.3 PG (ref 27–32)
MCHC RBC AUTO-ENTMCNC: 34.7 G/DL (ref 32–37)
MCV RBC AUTO: 87.3 FL (ref 80–100)
MONOCYTES # BLD: 0.8 K/UL (ref 0–1)
MONOCYTES NFR BLD: 8 %
NEUTROPHILS # BLD AUTO: 5.9 K/UL (ref 1.8–7.7)
NEUTROPHILS NFR BLD: 61 %
NONHDLC SERPL-MCNC: 102 MG/DL
OSMOLALITY UR CALC.SUM OF ELEC: 286 MOSM/KG (ref 275–295)
PLATELET # BLD AUTO: 183 K/UL (ref 140–400)
PMV BLD AUTO: 8.4 FL (ref 7.4–10.3)
POTASSIUM SERPL-SCNC: 3.9 MMOL/L (ref 3.3–5.1)
PROT SERPL-MCNC: 6.8 G/DL (ref 5.9–8.4)
RBC # BLD AUTO: 4.99 M/UL (ref 4.5–5.9)
SODIUM SERPL-SCNC: 137 MMOL/L (ref 136–144)
TRIGL SERPL-MCNC: 387 MG/DL (ref 1–149)
WBC # BLD AUTO: 9.7 K/UL (ref 4–11)

## 2017-08-18 PROCEDURE — 87086 URINE CULTURE/COLONY COUNT: CPT

## 2017-08-18 PROCEDURE — 80061 LIPID PANEL: CPT

## 2017-08-18 PROCEDURE — 83036 HEMOGLOBIN GLYCOSYLATED A1C: CPT

## 2017-08-18 PROCEDURE — 80053 COMPREHEN METABOLIC PANEL: CPT

## 2017-08-18 PROCEDURE — 36415 COLL VENOUS BLD VENIPUNCTURE: CPT

## 2017-08-18 PROCEDURE — 85025 COMPLETE CBC W/AUTO DIFF WBC: CPT

## 2017-08-18 RX ORDER — METOPROLOL TARTRATE 50 MG/1
TABLET, FILM COATED ORAL
Qty: 180 TABLET | Refills: 0 | Status: SHIPPED | OUTPATIENT
Start: 2017-08-18 | End: 2017-11-20

## 2017-08-18 NOTE — TELEPHONE ENCOUNTER
Refilled x1 per protocol.    Hypertensive Medications  Protocol Criteria:  · Appointment scheduled in the past 6 months or in the next 3 months  · BMP or CMP in the past 12 months  · Creatinine result < 2  Recent Outpatient Visits            3 days ago Hear

## 2017-10-26 ENCOUNTER — TELEPHONE (OUTPATIENT)
Dept: CARDIOLOGY CLINIC | Facility: CLINIC | Age: 47
End: 2017-10-26

## 2017-10-26 RX ORDER — ATORVASTATIN CALCIUM 80 MG/1
80 TABLET, FILM COATED ORAL
Qty: 90 TABLET | Refills: 0 | Status: SHIPPED | OUTPATIENT
Start: 2017-10-26 | End: 2018-01-27

## 2017-10-26 NOTE — TELEPHONE ENCOUNTER
Atorvastatin 80mg tabs, take 1 tab by mouth every day, qty 90    Current Outpatient Prescriptions:   •  Atorvastatin Calcium 80 MG Oral Tab, Take 1 tablet (80 mg total) by mouth once daily. , Disp: 90 tablet, Rfl: 3

## 2017-10-28 NOTE — TELEPHONE ENCOUNTER
Hypertensive Medications  Protocol Criteria:  · Appointment scheduled in the past 6 months or in the next 3 months  · BMP or CMP in the past 12 months  · Creatinine result < 2  Recent Outpatient Visits            2 months ago Heart disease    Trisha Chao MD [facility]    Office Visit    9 months ago Bilateral shoulder pain, unspecified chronicity    Cheryle Go, MD    Office Visit          No future appointments. Unable to refill per protocol due for ov.  .  pls advi

## 2017-11-01 RX ORDER — LISINOPRIL AND HYDROCHLOROTHIAZIDE 12.5; 1 MG/1; MG/1
TABLET ORAL
Qty: 90 TABLET | Refills: 0 | Status: SHIPPED | OUTPATIENT
Start: 2017-11-01 | End: 2018-01-27

## 2017-11-01 RX ORDER — ASPIRIN 81 MG/1
TABLET ORAL
Qty: 90 TABLET | Refills: 0 | Status: SHIPPED | OUTPATIENT
Start: 2017-11-01 | End: 2018-01-27

## 2017-11-21 RX ORDER — METOPROLOL TARTRATE 50 MG/1
TABLET, FILM COATED ORAL
Qty: 180 TABLET | Refills: 0 | Status: SHIPPED | OUTPATIENT
Start: 2017-11-21 | End: 2018-02-01

## 2017-11-30 ENCOUNTER — OFFICE VISIT (OUTPATIENT)
Dept: CARDIOLOGY CLINIC | Facility: CLINIC | Age: 47
End: 2017-11-30

## 2017-11-30 VITALS
SYSTOLIC BLOOD PRESSURE: 116 MMHG | BODY MASS INDEX: 39.39 KG/M2 | HEART RATE: 76 BPM | HEIGHT: 67 IN | DIASTOLIC BLOOD PRESSURE: 60 MMHG | RESPIRATION RATE: 18 BRPM | WEIGHT: 251 LBS

## 2017-11-30 DIAGNOSIS — E78.1 HYPERTRIGLYCERIDEMIA: ICD-10-CM

## 2017-11-30 DIAGNOSIS — I25.10 CORONARY ARTERY DISEASE INVOLVING NATIVE CORONARY ARTERY OF NATIVE HEART WITHOUT ANGINA PECTORIS: Primary | Chronic | ICD-10-CM

## 2017-11-30 DIAGNOSIS — I10 HTN (HYPERTENSION), BENIGN: ICD-10-CM

## 2017-11-30 DIAGNOSIS — E78.5 DYSLIPIDEMIA: ICD-10-CM

## 2017-11-30 PROCEDURE — 99212 OFFICE O/P EST SF 10 MIN: CPT | Performed by: INTERNAL MEDICINE

## 2017-11-30 PROCEDURE — 99214 OFFICE O/P EST MOD 30 MIN: CPT | Performed by: INTERNAL MEDICINE

## 2017-11-30 NOTE — PROGRESS NOTES
Abisai Sanchez is a 50year old male. Patient presents with:   Follow - Up  CAD: CABG X5  2013  Hypertension  Hyperlipidemia  Dyspnea: on exertion    HPI:   This is a pleasant 49-year-old with coronary disease bypass in 2013 with known risks and sleep apn cholesterol    • Hyperlipidemia    • Hypertension    • Hypertriglyceridemia    • Lipid screening 6/23/2014   • MADELIN on CPAP    • Sleep apnea    • Tobacco dependence    • Umbilical hernia 8688      Social History:  Smoking status: Current Some Day Smoker Patient should continue working on not smoking. 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   The patient is asked to return in

## 2017-11-30 NOTE — PATIENT INSTRUCTIONS
Continue working on smoking cessation   Resume exercise and diet  Work on weight loss  If any symptoms check blood pressure  Stable from a cardiac standpoint to continue driving

## 2017-12-22 ENCOUNTER — TELEPHONE (OUTPATIENT)
Dept: FAMILY MEDICINE CLINIC | Facility: CLINIC | Age: 47
End: 2017-12-22

## 2017-12-22 NOTE — TELEPHONE ENCOUNTER
PT stts he needs note stating he only takes Rx alprazolam 1 mg as needed and at night only. PT stts he needs this faxed to Jason Pedraza at [phone] attention Dr. Jayro Acosta. Pt is at Dr's office now and would like faxed asap.                Current Outpatient Pres

## 2017-12-26 ENCOUNTER — TELEPHONE (OUTPATIENT)
Dept: OTHER | Age: 47
End: 2017-12-26

## 2017-12-26 NOTE — TELEPHONE ENCOUNTER
Pt called in requesting an update on the status of the note. Pt states he had contacted Dr. Winnie Serrato at [facility] and they have not received the fax as of yet.

## 2017-12-26 NOTE — TELEPHONE ENCOUNTER
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 RESENDIZ confirming that he only takes Alprazolam 1mg as needed for sleep only. He needs a letter faxed to [phone], Attn: Dr. Collin Alvarez at [facility].   Letter is p

## 2017-12-27 ENCOUNTER — OFFICE VISIT (OUTPATIENT)
Dept: FAMILY MEDICINE CLINIC | Facility: CLINIC | Age: 47
End: 2017-12-27

## 2017-12-27 VITALS
BODY MASS INDEX: 39 KG/M2 | SYSTOLIC BLOOD PRESSURE: 127 MMHG | HEART RATE: 71 BPM | DIASTOLIC BLOOD PRESSURE: 77 MMHG | WEIGHT: 251 LBS

## 2017-12-27 DIAGNOSIS — F51.02 ADJUSTMENT INSOMNIA: ICD-10-CM

## 2017-12-27 DIAGNOSIS — E66.3 OVERWEIGHT: Primary | ICD-10-CM

## 2017-12-27 DIAGNOSIS — I10 ESSENTIAL HYPERTENSION: ICD-10-CM

## 2017-12-27 PROCEDURE — 99213 OFFICE O/P EST LOW 20 MIN: CPT | Performed by: FAMILY MEDICINE

## 2017-12-27 PROCEDURE — 99212 OFFICE O/P EST SF 10 MIN: CPT | Performed by: FAMILY MEDICINE

## 2017-12-27 NOTE — TELEPHONE ENCOUNTER
Contacted patient informed of Dr. Priscilla Nicholas message below, patient verbalized understanding. Patient was then transferred to the scheduling department to help patient schedule appointment.      Patient scheduled today at 12pm.

## 2017-12-28 NOTE — PROGRESS NOTES
HPI:    Patient ID: Niles Meckel is a 50year old male. Patient here for f/u insomnia and hypertension. Uses alprazolam for sleep only and to relax when comes from work. Hypertension controlled.    Denies any chest pain        Review of Systems Cardiovascular: Normal rate, regular rhythm, normal heart sounds and intact distal pulses. Pulmonary/Chest: Effort normal and breath sounds normal. No respiratory distress. He has no wheezes. He has no rales. He exhibits no tenderness.               AS

## 2018-01-19 ENCOUNTER — NURSE TRIAGE (OUTPATIENT)
Dept: OTHER | Age: 48
End: 2018-01-19

## 2018-01-19 RX ORDER — AMOXICILLIN 875 MG/1
875 TABLET, COATED ORAL 2 TIMES DAILY
Qty: 20 TABLET | Refills: 0 | Status: SHIPPED | OUTPATIENT
Start: 2018-01-19 | End: 2018-08-21 | Stop reason: ALTCHOICE

## 2018-01-19 NOTE — TELEPHONE ENCOUNTER
Action Requested: Summary for Provider     []  Critical Lab, Recommendations Needed  [] Need Additional Advice  []   FYI    []   Need Orders  [] Need Medications Sent to Pharmacy  []  Other     SUMMARY: pt states that at least once a year he gets a sinus i

## 2018-01-19 NOTE — TELEPHONE ENCOUNTER
Advised patient on Dr. Charlotte Mccarthy prescription, name, dose, frequency; patient verbalized understanding. Script sent to pharmacy. Advised patient to call back if any issues, questions; he agreed.

## 2018-01-27 DIAGNOSIS — H53.9 VISION DISORDER: Primary | ICD-10-CM

## 2018-01-28 RX ORDER — ESCITALOPRAM OXALATE 10 MG/1
10 TABLET ORAL DAILY
Qty: 90 TABLET | Refills: 0 | Status: SHIPPED | OUTPATIENT
Start: 2018-01-28 | End: 2018-04-26

## 2018-01-29 RX ORDER — ASPIRIN 81 MG/1
TABLET ORAL
Qty: 90 TABLET | Refills: 0 | Status: SHIPPED | OUTPATIENT
Start: 2018-01-29 | End: 2018-06-06

## 2018-01-29 RX ORDER — LISINOPRIL AND HYDROCHLOROTHIAZIDE 12.5; 1 MG/1; MG/1
TABLET ORAL
Qty: 90 TABLET | Refills: 0 | Status: SHIPPED | OUTPATIENT
Start: 2018-01-29 | End: 2018-04-26

## 2018-01-29 RX ORDER — ATORVASTATIN CALCIUM 80 MG/1
80 TABLET, FILM COATED ORAL
Qty: 90 TABLET | Refills: 0 | Status: SHIPPED | OUTPATIENT
Start: 2018-01-29 | End: 2018-04-26

## 2018-02-02 ENCOUNTER — TELEPHONE (OUTPATIENT)
Dept: FAMILY MEDICINE CLINIC | Facility: CLINIC | Age: 48
End: 2018-02-02

## 2018-02-02 DIAGNOSIS — G47.30 SLEEP APNEA, UNSPECIFIED TYPE: Primary | ICD-10-CM

## 2018-02-02 RX ORDER — METOPROLOL TARTRATE 50 MG/1
TABLET, FILM COATED ORAL
Qty: 180 TABLET | Refills: 0 | Status: SHIPPED | OUTPATIENT
Start: 2018-02-02 | End: 2018-04-26

## 2018-02-06 ENCOUNTER — TELEPHONE (OUTPATIENT)
Dept: FAMILY MEDICINE CLINIC | Facility: CLINIC | Age: 48
End: 2018-02-06

## 2018-02-06 DIAGNOSIS — G47.30 SLEEP APNEA, UNSPECIFIED TYPE: Primary | ICD-10-CM

## 2018-03-20 RX ORDER — ALPRAZOLAM 1 MG/1
TABLET ORAL
Qty: 30 TABLET | Refills: 1 | Status: SHIPPED | OUTPATIENT
Start: 2018-03-20 | End: 2019-01-06

## 2018-04-27 RX ORDER — ESCITALOPRAM OXALATE 10 MG/1
10 TABLET ORAL DAILY
Qty: 90 TABLET | Refills: 0 | Status: SHIPPED | OUTPATIENT
Start: 2018-04-27 | End: 2018-07-28

## 2018-04-27 RX ORDER — METOPROLOL TARTRATE 50 MG/1
TABLET, FILM COATED ORAL
Qty: 180 TABLET | Refills: 0 | Status: SHIPPED | OUTPATIENT
Start: 2018-04-27 | End: 2018-08-06

## 2018-04-27 RX ORDER — LISINOPRIL AND HYDROCHLOROTHIAZIDE 12.5; 1 MG/1; MG/1
TABLET ORAL
Qty: 90 TABLET | Refills: 0 | Status: SHIPPED | OUTPATIENT
Start: 2018-04-27 | End: 2018-08-06

## 2018-04-27 NOTE — TELEPHONE ENCOUNTER
LOV 11/30/18 F/U 1 YR.  No AST/ALT since 3/15/16    90 tabs 0 refills pended        LIPID PANEL   Order: [id number]   Collected:  8/18/2017  7:56 AM   Status:  Final result   Dx:  Heart disease   Notes Recorded by Juan Chinchilla CMA on 9/26/2017

## 2018-05-01 RX ORDER — ATORVASTATIN CALCIUM 80 MG/1
80 TABLET, FILM COATED ORAL
Qty: 90 TABLET | Refills: 0 | Status: SHIPPED | OUTPATIENT
Start: 2018-05-01 | End: 2018-07-27

## 2018-06-06 RX ORDER — ASPIRIN 81 MG/1
TABLET ORAL
Qty: 90 TABLET | Refills: 0 | Status: SHIPPED | OUTPATIENT
Start: 2018-06-06 | End: 2018-08-06

## 2018-06-27 ENCOUNTER — TELEPHONE (OUTPATIENT)
Dept: OTHER | Age: 48
End: 2018-06-27

## 2018-06-27 DIAGNOSIS — H53.9 VISION DISORDER: Primary | ICD-10-CM

## 2018-06-27 NOTE — TELEPHONE ENCOUNTER
Dr Beatriz Mcdaniel, please advise. Patient saw his optometrist who recommended that he see a retinal specialist, said he has possible retinal deterioration. Was recommendation to go to [facility], Dr Choco Rios or Dr Vincent Parker, fax # [phone].

## 2018-07-10 ENCOUNTER — TELEPHONE (OUTPATIENT)
Dept: OTHER | Age: 48
End: 2018-07-10

## 2018-07-10 DIAGNOSIS — G47.33 OBSTRUCTIVE SLEEP APNEA (ADULT) (PEDIATRIC): Primary | ICD-10-CM

## 2018-07-23 ENCOUNTER — TELEPHONE (OUTPATIENT)
Dept: CARDIOLOGY CLINIC | Facility: CLINIC | Age: 48
End: 2018-07-23

## 2018-07-23 NOTE — TELEPHONE ENCOUNTER
Informed pt Dr. Mendoza Ramp instructions per OV notes 11/30/2017. F/u in one year. Verbalized understanding.      In conclusion this is a pleasant 53-year-old with coronary disease and bypass 2013 who has no acute cardiac symptoms.   With history is stable f

## 2018-07-23 NOTE — TELEPHONE ENCOUNTER
Pt is requesting order for stress test states its been about a year.  Pt would like to know if he is to see dr Narda Partida before or after the stress test ? That way he can get referrals to see him please call thankyou

## 2018-07-27 RX ORDER — ATORVASTATIN CALCIUM 80 MG/1
80 TABLET, FILM COATED ORAL
Qty: 90 TABLET | Refills: 1 | Status: SHIPPED | OUTPATIENT
Start: 2018-07-27 | End: 2019-01-14

## 2018-07-28 NOTE — TELEPHONE ENCOUNTER
Refill Protocol Appointment Criteria  · Appointment scheduled in the past 6 months or in the next 3 months  Recent Outpatient Visits            7 months ago Overweight    Cheryle Go, MD    Office Visit    8 mon

## 2018-07-30 NOTE — TELEPHONE ENCOUNTER
Please advise no current appointment.      Hypertensive Medications  Protocol Criteria:  · Appointment scheduled in the past 6 months or in the next 3 months  · BMP or CMP in the past 12 months  · Creatinine result < 2  Recent Outpatient Visits

## 2018-07-31 ENCOUNTER — TELEPHONE (OUTPATIENT)
Dept: FAMILY MEDICINE CLINIC | Facility: CLINIC | Age: 48
End: 2018-07-31

## 2018-07-31 DIAGNOSIS — I51.9 HEART DISEASE, UNSPECIFIED: Primary | ICD-10-CM

## 2018-07-31 NOTE — TELEPHONE ENCOUNTER
Patient called requesting a referral to see Dr. Анна Wong. Pended referral. Please review diagnosis and sign off if you agree.     Thank you,  [facility] [phone]

## 2018-07-31 NOTE — TELEPHONE ENCOUNTER
Patient states pharmacy sent request for     ASPIRIN 81 MG Oral Tab EC TAKE 1 TABLET BY MOUTH EVERY DAY Disp: 90 tablet Rfl: 0   LISINOPRIL-HYDROCHLOROTHIAZIDE 10-12.5 MG Oral Tab TAKE 1 TABLET BY MOUTH ONCE DAILY.  Disp: 90 tablet Rfl: 0   ESCITALOPRAM 10

## 2018-08-01 RX ORDER — ASPIRIN 81 MG/1
TABLET ORAL
Qty: 90 TABLET | Refills: 0 | OUTPATIENT
Start: 2018-08-01

## 2018-08-01 RX ORDER — METOPROLOL TARTRATE 50 MG/1
TABLET, FILM COATED ORAL
Qty: 180 TABLET | Refills: 0 | OUTPATIENT
Start: 2018-08-01

## 2018-08-01 RX ORDER — LISINOPRIL AND HYDROCHLOROTHIAZIDE 12.5; 1 MG/1; MG/1
TABLET ORAL
Qty: 90 TABLET | Refills: 0 | OUTPATIENT
Start: 2018-08-01

## 2018-08-01 NOTE — TELEPHONE ENCOUNTER
Duplicate request, 1st request submitted 7/27/18 and routed to Dr RESENDIZ  See other refill encounter 7/27/18

## 2018-08-02 RX ORDER — ESCITALOPRAM OXALATE 10 MG/1
10 TABLET ORAL DAILY
Qty: 30 TABLET | Refills: 0 | Status: SHIPPED | OUTPATIENT
Start: 2018-08-02 | End: 2018-08-21 | Stop reason: ALTCHOICE

## 2018-08-06 RX ORDER — ASPIRIN 81 MG/1
TABLET ORAL
Qty: 90 TABLET | Refills: 0 | Status: SHIPPED | OUTPATIENT
Start: 2018-08-06 | End: 2018-11-30

## 2018-08-06 RX ORDER — LISINOPRIL AND HYDROCHLOROTHIAZIDE 12.5; 1 MG/1; MG/1
TABLET ORAL
Qty: 90 TABLET | Refills: 0 | Status: SHIPPED | OUTPATIENT
Start: 2018-08-06 | End: 2018-08-21

## 2018-08-06 RX ORDER — METOPROLOL TARTRATE 50 MG/1
50 TABLET, FILM COATED ORAL 2 TIMES DAILY
Qty: 180 TABLET | Refills: 0 | Status: SHIPPED | OUTPATIENT
Start: 2018-08-06 | End: 2018-08-21

## 2018-08-06 NOTE — TELEPHONE ENCOUNTER
Patient calling stating he has been out of BP meds and really needs refills, has made f/u appt. See also refill encounter from 7/27/18. Refilled per protocol.      Refill Protocol Appointment Criteria  · Appointment scheduled in the past 6 months or in the PTA    Office Visit    1 year ago Biceps tendonitis, left    [facility] Carmencita Montanez, PT    Office Visit        Future Appointments       Provider Department Appt Notes    In 2 weeks MD Wm Ross

## 2018-08-06 NOTE — TELEPHONE ENCOUNTER
Pt. states that he has run out of his BP med. Need refill for Lisinipril HTZ and Pro-Air Inhaler. Pt. States that the pharm gave him 3 pills last week, so pt has been out of his med for about 1 week.

## 2018-08-21 ENCOUNTER — OFFICE VISIT (OUTPATIENT)
Dept: FAMILY MEDICINE CLINIC | Facility: CLINIC | Age: 48
End: 2018-08-21

## 2018-08-21 VITALS
BODY MASS INDEX: 40.81 KG/M2 | WEIGHT: 260 LBS | SYSTOLIC BLOOD PRESSURE: 144 MMHG | TEMPERATURE: 98 F | HEIGHT: 67 IN | DIASTOLIC BLOOD PRESSURE: 83 MMHG | HEART RATE: 77 BPM

## 2018-08-21 DIAGNOSIS — I10 ESSENTIAL HYPERTENSION: ICD-10-CM

## 2018-08-21 DIAGNOSIS — G47.30 SLEEP APNEA, UNSPECIFIED TYPE: Primary | ICD-10-CM

## 2018-08-21 DIAGNOSIS — F17.200 TOBACCO DEPENDENCE: ICD-10-CM

## 2018-08-21 PROCEDURE — 99213 OFFICE O/P EST LOW 20 MIN: CPT | Performed by: FAMILY MEDICINE

## 2018-08-21 PROCEDURE — 99212 OFFICE O/P EST SF 10 MIN: CPT | Performed by: FAMILY MEDICINE

## 2018-08-21 RX ORDER — METOPROLOL TARTRATE 50 MG/1
50 TABLET, FILM COATED ORAL 2 TIMES DAILY
Qty: 180 TABLET | Refills: 1 | Status: SHIPPED | OUTPATIENT
Start: 2018-08-21 | End: 2019-04-05

## 2018-08-21 RX ORDER — LISINOPRIL AND HYDROCHLOROTHIAZIDE 12.5; 1 MG/1; MG/1
1 TABLET ORAL
Qty: 90 TABLET | Refills: 1 | Status: SHIPPED | OUTPATIENT
Start: 2018-08-21 | End: 2019-04-05

## 2018-08-21 RX ORDER — ESCITALOPRAM OXALATE 10 MG/1
10 TABLET ORAL DAILY
Qty: 90 TABLET | Refills: 1 | Status: SHIPPED | OUTPATIENT
Start: 2018-08-21 | End: 2019-01-04

## 2018-08-21 NOTE — PROGRESS NOTES
HPI:    Patient ID: Cher Diaz is a 46year old male. Anxiety controlled. Needs refill of medications. No chest pain no shortness of breath.,   Still smokes around 5 cigarettes a day.    Gained weight        Review of Systems   Constitutional: N ASSESSMENT/PLAN:   Sleep apnea, unspecified type  (primary encounter diagnosis)  (G47.30) Sleep apnea, unspecified type  (primary encounter diagnosis)  Plan: PULMONARY - INTERNAL        (F17.200) Tobacco dependence  Plan:counselling for tobacco depende

## 2018-08-22 ENCOUNTER — TELEPHONE (OUTPATIENT)
Dept: OTHER | Age: 48
End: 2018-08-22

## 2018-08-22 RX ORDER — ERYTHROMYCIN 5 MG/G
1 OINTMENT OPHTHALMIC NIGHTLY
Qty: 1 TUBE | Refills: 0 | Status: SHIPPED | OUTPATIENT
Start: 2018-08-22 | End: 2019-04-16 | Stop reason: ALTCHOICE

## 2018-08-23 NOTE — TELEPHONE ENCOUNTER
Pt stated he was seen yesterday and was told a RX - antibacterial cream  would be sent for left eye   Please reply to pool: EM RN [place] pending

## 2018-09-20 ENCOUNTER — OFFICE VISIT (OUTPATIENT)
Dept: PULMONOLOGY | Facility: CLINIC | Age: 48
End: 2018-09-20

## 2018-09-20 VITALS
WEIGHT: 262.63 LBS | HEIGHT: 67 IN | DIASTOLIC BLOOD PRESSURE: 75 MMHG | BODY MASS INDEX: 41.22 KG/M2 | HEART RATE: 71 BPM | SYSTOLIC BLOOD PRESSURE: 116 MMHG | OXYGEN SATURATION: 97 % | RESPIRATION RATE: 18 BRPM

## 2018-09-20 DIAGNOSIS — Z99.89 OSA ON CPAP: Primary | ICD-10-CM

## 2018-09-20 DIAGNOSIS — G47.33 OSA ON CPAP: Primary | ICD-10-CM

## 2018-09-20 PROCEDURE — 99212 OFFICE O/P EST SF 10 MIN: CPT | Performed by: INTERNAL MEDICINE

## 2018-09-20 PROCEDURE — 99213 OFFICE O/P EST LOW 20 MIN: CPT | Performed by: INTERNAL MEDICINE

## 2018-09-20 NOTE — PROGRESS NOTES
The patient is a 76-year-old male who I know well from prior evaluation comes in now for follow-up. He is vigilant with use of his CPAP every night all night and needs to get his data downloaded. His device may be malfunctioning.   I am now has fallen off

## 2018-09-27 ENCOUNTER — TELEPHONE (OUTPATIENT)
Dept: PULMONOLOGY | Facility: CLINIC | Age: 48
End: 2018-09-27

## 2018-09-27 DIAGNOSIS — G47.33 OSA (OBSTRUCTIVE SLEEP APNEA): Primary | ICD-10-CM

## 2018-09-27 NOTE — TELEPHONE ENCOUNTER
Fax received from [facility] requesting cpap supplies. Per protocol pt seen within 2 years. Orders placed for cpap supplies and faxed to [facility] [phone].

## 2018-10-09 ENCOUNTER — OFFICE VISIT (OUTPATIENT)
Dept: CARDIOLOGY CLINIC | Facility: CLINIC | Age: 48
End: 2018-10-09

## 2018-10-09 ENCOUNTER — TELEPHONE (OUTPATIENT)
Dept: PULMONOLOGY | Facility: CLINIC | Age: 48
End: 2018-10-09

## 2018-10-09 VITALS
SYSTOLIC BLOOD PRESSURE: 126 MMHG | HEART RATE: 78 BPM | BODY MASS INDEX: 25.11 KG/M2 | WEIGHT: 160 LBS | DIASTOLIC BLOOD PRESSURE: 80 MMHG | HEIGHT: 67 IN

## 2018-10-09 DIAGNOSIS — I25.10 CORONARY ARTERY DISEASE INVOLVING NATIVE CORONARY ARTERY OF NATIVE HEART WITHOUT ANGINA PECTORIS: Primary | ICD-10-CM

## 2018-10-09 DIAGNOSIS — I10 HTN (HYPERTENSION), BENIGN: ICD-10-CM

## 2018-10-09 DIAGNOSIS — E78.5 DYSLIPIDEMIA: ICD-10-CM

## 2018-10-09 PROCEDURE — 99214 OFFICE O/P EST MOD 30 MIN: CPT | Performed by: INTERNAL MEDICINE

## 2018-10-09 PROCEDURE — 99212 OFFICE O/P EST SF 10 MIN: CPT | Performed by: INTERNAL MEDICINE

## 2018-10-09 NOTE — PROGRESS NOTES
Leno Rodriguez is a 46year old male. Patient presents with:   Follow - Up: pt in for yearly f/u  Hypertension    HPI:   This is a pleasant 52-year-old with coronary disease known bypass in 2013 with sleep apnea hypertension and elevated cholesterol prese Sleep apnea    • Tobacco dependence    • Umbilical hernia 9667      Social History:  Social History    Tobacco Use      Smoking status: Current Every Day Smoker        Packs/day: 0.50        Years: 30.00        Pack years: 15        Types: Cigarettes on smoking cessation diet and weight loss. His cholesterol is been at goal and will be rechecked at this time. He will call if changes  The patient indicates understanding of these issues and agrees to the plan.   The patient is asked to return in 1 year

## 2018-10-18 RX ORDER — ALBUTEROL SULFATE 90 UG/1
2 AEROSOL, METERED RESPIRATORY (INHALATION) EVERY 6 HOURS PRN
Qty: 1 INHALER | Refills: 1 | Status: SHIPPED | OUTPATIENT
Start: 2018-10-18 | End: 2019-01-04

## 2018-10-19 ENCOUNTER — NURSE TRIAGE (OUTPATIENT)
Dept: OTHER | Age: 48
End: 2018-10-19

## 2018-10-19 NOTE — TELEPHONE ENCOUNTER
pt stated that he was in to see you for his Px and then shortly after he noticed these 3 dime size dry spots on the top of his right hand by the knuckles.  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

## 2018-10-19 NOTE — TELEPHONE ENCOUNTER
Advised patient of Dr. Augie Jimenez note. Patient verbalized understanding. Appointment made for 10/23/18 at 3pm with Dr Steve Abdalal at [place].

## 2018-10-23 ENCOUNTER — APPOINTMENT (OUTPATIENT)
Dept: LAB | Age: 48
End: 2018-10-23
Attending: FAMILY MEDICINE
Payer: COMMERCIAL

## 2018-10-23 ENCOUNTER — OFFICE VISIT (OUTPATIENT)
Dept: FAMILY MEDICINE CLINIC | Facility: CLINIC | Age: 48
End: 2018-10-23

## 2018-10-23 VITALS
TEMPERATURE: 98 F | HEIGHT: 67 IN | BODY MASS INDEX: 25.11 KG/M2 | HEART RATE: 79 BPM | WEIGHT: 160 LBS | DIASTOLIC BLOOD PRESSURE: 75 MMHG | SYSTOLIC BLOOD PRESSURE: 115 MMHG

## 2018-10-23 DIAGNOSIS — I10 HTN (HYPERTENSION), BENIGN: ICD-10-CM

## 2018-10-23 DIAGNOSIS — I25.10 CORONARY ARTERY DISEASE INVOLVING NATIVE CORONARY ARTERY OF NATIVE HEART WITHOUT ANGINA PECTORIS: ICD-10-CM

## 2018-10-23 DIAGNOSIS — L30.9 ECZEMA OF BOTH HANDS: Primary | ICD-10-CM

## 2018-10-23 DIAGNOSIS — E78.5 DYSLIPIDEMIA: ICD-10-CM

## 2018-10-23 PROCEDURE — 36415 COLL VENOUS BLD VENIPUNCTURE: CPT

## 2018-10-23 PROCEDURE — 80053 COMPREHEN METABOLIC PANEL: CPT

## 2018-10-23 PROCEDURE — 99213 OFFICE O/P EST LOW 20 MIN: CPT | Performed by: FAMILY MEDICINE

## 2018-10-23 PROCEDURE — 99212 OFFICE O/P EST SF 10 MIN: CPT | Performed by: FAMILY MEDICINE

## 2018-10-23 PROCEDURE — 80061 LIPID PANEL: CPT

## 2018-10-23 RX ORDER — HYDROCORTISONE VALERATE 2 MG/G
OINTMENT TOPICAL
Qty: 60 G | Refills: 0 | Status: SHIPPED | OUTPATIENT
Start: 2018-10-23 | End: 2019-08-26 | Stop reason: ALTCHOICE

## 2018-10-23 NOTE — PROGRESS NOTES
HPI:    Patient ID: Rylanvely Art is a 46year old male. Patient has dry patches on the skinof the hand and also on the leg/.   Its itchy        Review of Systems   Constitutional: Negative. Respiratory: Negative. Cardiovascular: Negative. patches of the skin hand and leg              ASSESSMENT/PLAN:   Eczema  Start westcort oint. If no better needs to call me    No orders of the defined types were placed in this encounter.       Meds This Visit:  Requested Prescriptions     Signed Prescript

## 2018-10-29 ENCOUNTER — TELEPHONE (OUTPATIENT)
Dept: CARDIOLOGY CLINIC | Facility: CLINIC | Age: 48
End: 2018-10-29

## 2018-10-29 DIAGNOSIS — E78.1 HYPERTRIGLYCERIDEMIA: Primary | ICD-10-CM

## 2018-11-30 NOTE — TELEPHONE ENCOUNTER
Current Outpatient Medications:   •  escitalopram 10 MG Oral Tab, Take 1 tablet (10 mg total) by mouth daily. , Disp: 90 tablet, Rfl: 1

## 2018-12-01 RX ORDER — ESCITALOPRAM OXALATE 10 MG/1
10 TABLET ORAL DAILY
Qty: 90 TABLET | Refills: 1 | OUTPATIENT
Start: 2018-12-01

## 2018-12-01 RX ORDER — ASPIRIN 81 MG/1
TABLET ORAL
Qty: 90 TABLET | Refills: 0 | Status: SHIPPED | OUTPATIENT
Start: 2018-12-01 | End: 2019-02-25

## 2018-12-01 NOTE — TELEPHONE ENCOUNTER
Patient has refills until Feb 2019, too soon to refill     Disp Refills Start End    escitalopram 10 MG Oral Tab 90 tablet 1 8/21/2018     Sig - Route:  Take 1 tablet (10 mg total) by mouth daily. - Oral    Sent to pharmacy as: Escitalopram Oxalate 10 MG Or

## 2018-12-01 NOTE — TELEPHONE ENCOUNTER
90 DAYS SUPPLY OF MEDICATION SENT TO PHARMACY PER PROTOCOL, LOV 10/23/18    Non-Narcotic Pain Medication Protocol Tlsyre86/30 2:57 PM   Appointment in past 6 or next 3 months

## 2019-01-04 RX ORDER — ALBUTEROL SULFATE 90 UG/1
AEROSOL, METERED RESPIRATORY (INHALATION)
Qty: 1 INHALER | Refills: 1 | Status: SHIPPED | OUTPATIENT
Start: 2019-01-04 | End: 2019-03-17

## 2019-01-04 RX ORDER — ESCITALOPRAM OXALATE 10 MG/1
TABLET ORAL
Qty: 90 TABLET | Refills: 1 | Status: SHIPPED | OUTPATIENT
Start: 2019-01-04 | End: 2019-08-25

## 2019-01-07 RX ORDER — ALPRAZOLAM 1 MG/1
TABLET ORAL
Qty: 30 TABLET | Refills: 0 | OUTPATIENT
Start: 2019-01-07 | End: 2019-01-08

## 2019-01-07 NOTE — TELEPHONE ENCOUNTER
No Protocol on this med.      Requested Prescriptions     Pending Prescriptions Disp Refills   • ALPRAZOLAM 1 MG Oral Tab [Pharmacy Med Name: ALPRAZOLAM 1 MG TABLET] 30 tablet 0     Sig: TAKE 1 TABLET BY MOUTH EVERY EVENING AS NEEDED FOR SLEEP       Last Of

## 2019-01-08 RX ORDER — ALPRAZOLAM 1 MG/1
TABLET ORAL
Qty: 30 TABLET | Refills: 1 | Status: SHIPPED | OUTPATIENT
Start: 2019-01-08 | End: 2019-08-16

## 2019-01-14 RX ORDER — ATORVASTATIN CALCIUM 80 MG/1
80 TABLET, FILM COATED ORAL
Qty: 90 TABLET | Refills: 1 | Status: SHIPPED | OUTPATIENT
Start: 2019-01-14 | End: 2019-07-14

## 2019-01-14 NOTE — TELEPHONE ENCOUNTER
Verified medication dose, Meets protocol , refill order sent  Cholesterol Medications  Protocol Criteria:  · Appointment scheduled with Cardiology in the past 12 months or in the next 3 months  · ALT & LDL on file in the past 12 months  · ALT result less t

## 2019-02-04 ENCOUNTER — NURSE TRIAGE (OUTPATIENT)
Dept: FAMILY MEDICINE CLINIC | Facility: CLINIC | Age: 49
End: 2019-02-04

## 2019-02-04 NOTE — TELEPHONE ENCOUNTER
Pt states a cold last week, now with sinus infection symptoms he gets frequently. Fever \"on and off\" but has not taken temperature with thermometer. Requesting same medication as had last time - Amoxicillin 875 mg #20.

## 2019-02-05 RX ORDER — AMOXICILLIN 875 MG/1
875 TABLET, COATED ORAL 2 TIMES DAILY
Qty: 20 TABLET | Refills: 0 | Status: SHIPPED | OUTPATIENT
Start: 2019-02-05 | End: 2019-04-16 | Stop reason: ALTCHOICE

## 2019-02-05 NOTE — TELEPHONE ENCOUNTER
Patient following up on request for antibiotic to be sent to his pharmacy, reports chronic sinus infections, currently has one brought on by a cold.  Please advise on request.

## 2019-02-25 NOTE — TELEPHONE ENCOUNTER
Received 90 day supply request....     ASPIRIN 81 MG TABLET   QTY 90   TAKE 1 TABLET BY MOUTH EVERY DAY

## 2019-02-26 RX ORDER — ASPIRIN 81 MG/1
81 TABLET ORAL
Qty: 90 TABLET | Refills: 0 | Status: SHIPPED | OUTPATIENT
Start: 2019-02-26 | End: 2019-05-23

## 2019-04-05 RX ORDER — METOPROLOL TARTRATE 50 MG/1
TABLET, FILM COATED ORAL
Qty: 60 TABLET | Refills: 0 | OUTPATIENT
Start: 2019-04-05 | End: 2019-05-02

## 2019-04-05 RX ORDER — LISINOPRIL AND HYDROCHLOROTHIAZIDE 12.5; 1 MG/1; MG/1
TABLET ORAL
Qty: 30 TABLET | Refills: 0 | OUTPATIENT
Start: 2019-04-05 | End: 2019-05-02

## 2019-04-16 ENCOUNTER — OFFICE VISIT (OUTPATIENT)
Dept: FAMILY MEDICINE CLINIC | Facility: CLINIC | Age: 49
End: 2019-04-16

## 2019-04-16 VITALS
HEART RATE: 72 BPM | SYSTOLIC BLOOD PRESSURE: 144 MMHG | TEMPERATURE: 99 F | DIASTOLIC BLOOD PRESSURE: 82 MMHG | BODY MASS INDEX: 40.65 KG/M2 | HEIGHT: 67 IN | WEIGHT: 259 LBS

## 2019-04-16 DIAGNOSIS — E78.5 HYPERLIPIDEMIA, UNSPECIFIED HYPERLIPIDEMIA TYPE: ICD-10-CM

## 2019-04-16 DIAGNOSIS — R06.00 DYSPNEA, UNSPECIFIED TYPE: Primary | ICD-10-CM

## 2019-04-16 PROCEDURE — 99214 OFFICE O/P EST MOD 30 MIN: CPT | Performed by: FAMILY MEDICINE

## 2019-04-16 PROCEDURE — 99212 OFFICE O/P EST SF 10 MIN: CPT | Performed by: FAMILY MEDICINE

## 2019-04-16 RX ORDER — ALBUTEROL SULFATE 90 UG/1
AEROSOL, METERED RESPIRATORY (INHALATION)
Qty: 8.5 INHALER | Refills: 1 | Status: SHIPPED | OUTPATIENT
Start: 2019-04-16 | End: 2019-07-30

## 2019-04-16 NOTE — PROGRESS NOTES
HPI:    Patient ID: Suleman Ohara is a 44year old male. Patient here for f/u. Started using proventil inhaler few times a day. Denies any chest pain. Still smokes but not daily . Review of Systems   Constitutional: Negative.   Negative for obese   HENT:   Head: Normocephalic and atraumatic. Right Ear: External ear normal.   Cardiovascular: Normal rate, regular rhythm and normal heart sounds. Exam reveals no gallop and no friction rub. No murmur heard.   Pulmonary/Chest:   Prolonged exp

## 2019-04-23 ENCOUNTER — TELEPHONE (OUTPATIENT)
Dept: FAMILY MEDICINE CLINIC | Facility: CLINIC | Age: 49
End: 2019-04-23

## 2019-04-23 NOTE — TELEPHONE ENCOUNTER
Patient calling to check on taking medications prior to pulmonary function testing and fasting labs next week. He has already been given instructions by pulmonary function lab.  Advised patient to fast 12 hours before blood draw and continue routine meds

## 2019-04-25 ENCOUNTER — TELEPHONE (OUTPATIENT)
Dept: PULMONOLOGY | Facility: CLINIC | Age: 49
End: 2019-04-25

## 2019-04-25 DIAGNOSIS — G47.33 OSA ON CPAP: Primary | ICD-10-CM

## 2019-04-25 DIAGNOSIS — R73.9 HYPERGLYCEMIA: ICD-10-CM

## 2019-04-25 NOTE — TELEPHONE ENCOUNTER
LOV 9/20/18  Last rx for CPAP supplies 9/27/18. Confirmed that [facility] needs new rx for CPAP supplies. Explained will generate new rx, so [facility] can work on referral. She voiced understanding. Rx placed per protocol. [facility]- pls assist w/ referral. Thanks.

## 2019-04-25 NOTE — TELEPHONE ENCOUNTER
Mg Clinton from [facility] calling to check referral status for cpap supplies

## 2019-05-02 RX ORDER — LISINOPRIL AND HYDROCHLOROTHIAZIDE 12.5; 1 MG/1; MG/1
TABLET ORAL
Qty: 30 TABLET | Refills: 0 | Status: SHIPPED | OUTPATIENT
Start: 2019-05-02 | End: 2019-05-25

## 2019-05-02 RX ORDER — METOPROLOL TARTRATE 50 MG/1
TABLET, FILM COATED ORAL
Qty: 60 TABLET | Refills: 0 | Status: SHIPPED | OUTPATIENT
Start: 2019-05-02 | End: 2019-05-25

## 2019-05-03 ENCOUNTER — LAB ENCOUNTER (OUTPATIENT)
Dept: LAB | Facility: HOSPITAL | Age: 49
End: 2019-05-03
Attending: FAMILY MEDICINE
Payer: COMMERCIAL

## 2019-05-03 ENCOUNTER — HOSPITAL ENCOUNTER (OUTPATIENT)
Dept: RESPIRATORY THERAPY | Facility: HOSPITAL | Age: 49
Discharge: HOME | End: 2019-05-03
Attending: FAMILY MEDICINE
Payer: COMMERCIAL

## 2019-05-03 DIAGNOSIS — E78.5 HYPERLIPIDEMIA, UNSPECIFIED HYPERLIPIDEMIA TYPE: ICD-10-CM

## 2019-05-03 DIAGNOSIS — R73.9 HYPERGLYCEMIA: ICD-10-CM

## 2019-05-03 DIAGNOSIS — R06.00 DYSPNEA, UNSPECIFIED TYPE: ICD-10-CM

## 2019-05-03 PROCEDURE — 80053 COMPREHEN METABOLIC PANEL: CPT

## 2019-05-03 PROCEDURE — 84443 ASSAY THYROID STIM HORMONE: CPT

## 2019-05-03 PROCEDURE — 83036 HEMOGLOBIN GLYCOSYLATED A1C: CPT

## 2019-05-03 PROCEDURE — 85025 COMPLETE CBC W/AUTO DIFF WBC: CPT

## 2019-05-03 PROCEDURE — 94060 EVALUATION OF WHEEZING: CPT | Performed by: INTERNAL MEDICINE

## 2019-05-03 PROCEDURE — 94726 PLETHYSMOGRAPHY LUNG VOLUMES: CPT | Performed by: INTERNAL MEDICINE

## 2019-05-03 PROCEDURE — 94729 DIFFUSING CAPACITY: CPT | Performed by: INTERNAL MEDICINE

## 2019-05-03 PROCEDURE — 36415 COLL VENOUS BLD VENIPUNCTURE: CPT

## 2019-05-03 PROCEDURE — 80061 LIPID PANEL: CPT

## 2019-05-06 NOTE — ADDENDUM NOTE
Encounter addended by: Beto Diaz MD on: 5/6/2019 11:16 AM   Actions taken: Sign clinical note, Charge Capture section accepted

## 2019-05-06 NOTE — PROCEDURES
[facility]    Patient's Name Janak Goodrich MRN [id number]    1970 Pulmonologist Yeimi Peterson MD   Location [facility] PCP Selena Kenny MD     IMPRESSION:    The PFTs are Abnormal.    There is very se

## 2019-05-08 ENCOUNTER — TELEPHONE (OUTPATIENT)
Dept: FAMILY MEDICINE CLINIC | Facility: CLINIC | Age: 49
End: 2019-05-08

## 2019-05-08 NOTE — TELEPHONE ENCOUNTER
----- Message from Darlean Dance, MD sent at 5/7/2019  9:36 PM CDT -----  Please call patient for an appointment to review results as results are abnormal

## 2019-05-10 ENCOUNTER — TELEPHONE (OUTPATIENT)
Dept: FAMILY MEDICINE CLINIC | Facility: CLINIC | Age: 49
End: 2019-05-10

## 2019-05-10 ENCOUNTER — OFFICE VISIT (OUTPATIENT)
Dept: FAMILY MEDICINE CLINIC | Facility: CLINIC | Age: 49
End: 2019-05-10

## 2019-05-10 VITALS
BODY MASS INDEX: 40.65 KG/M2 | TEMPERATURE: 98 F | HEART RATE: 73 BPM | WEIGHT: 259 LBS | DIASTOLIC BLOOD PRESSURE: 76 MMHG | SYSTOLIC BLOOD PRESSURE: 127 MMHG | HEIGHT: 67 IN

## 2019-05-10 DIAGNOSIS — G47.30 SLEEP APNEA, UNSPECIFIED TYPE: ICD-10-CM

## 2019-05-10 DIAGNOSIS — R73.03 PREDIABETES: ICD-10-CM

## 2019-05-10 DIAGNOSIS — J44.9 CHRONIC OBSTRUCTIVE PULMONARY DISEASE, UNSPECIFIED COPD TYPE (HCC): Primary | ICD-10-CM

## 2019-05-10 PROCEDURE — 99214 OFFICE O/P EST MOD 30 MIN: CPT | Performed by: FAMILY MEDICINE

## 2019-05-10 PROCEDURE — 99212 OFFICE O/P EST SF 10 MIN: CPT | Performed by: FAMILY MEDICINE

## 2019-05-10 NOTE — TELEPHONE ENCOUNTER
Ellie Spain please see  pharmacy message below. I called the pharmacy and they do not know of a alternative.  Please Advise

## 2019-05-10 NOTE — TELEPHONE ENCOUNTER
Per pharmacy fax insurance does not cover medication below $460 please fax alternative    Current Outpatient Medications:   •  Indacaterol-Glycopyrrolate (UTIBRON NEOHALER) 27.5-15.6 MCG Inhalation Cap, Inhale 1 capsule into the lungs 2 (two) times daily. ,

## 2019-05-10 NOTE — TELEPHONE ENCOUNTER
Message noted. Unfortunately, I am unaware of this medication and do not know an alternative. This would be best addressed by Dr. Priscilla Nicholas. If patient is experiencing symptoms then he must go to UC/ER if he is not breathing well.  Otherwise, Dr. Priscilla Nicholas can ad

## 2019-05-11 RX ORDER — FLUTICASONE PROPIONATE 220 UG/1
AEROSOL, METERED RESPIRATORY (INHALATION)
Qty: 24 INHALER | Refills: 1 | Status: SHIPPED | OUTPATIENT
Start: 2019-05-11 | End: 2019-12-11

## 2019-05-11 NOTE — PROGRESS NOTES
HPI:    Patient ID: Som Cordova is a 44year old male. Here for f/u test results- has severe expiratory obstruction. Patient complains about being easily short of breath and unable to do treadmill more then 5 min .  This  Is ongoing process, he u Vitamin (MULTI-VITAMINS) Oral Tab Take  by mouth. Disp:  Rfl:    Tiotropium Bromide Monohydrate (SPIRIVA HANDIHALER) 18 MCG Inhalation Cap Inhale 1 capsule (18 mcg total) into the lungs daily.  Disp: 90 capsule Rfl: 3     Allergies:  Neosporin [Neomycin*

## 2019-05-11 NOTE — TELEPHONE ENCOUNTER
Refill passed per [facility] protocol.   Refill Protocol Appointment Criteria  · Appointment scheduled in the past 6 months or in the next 3 months  Recent Outpatient Visits            Yesterday Chronic obstructive pulmonary disease, unspecified COPD t

## 2019-05-24 RX ORDER — ASPIRIN 81 MG/1
TABLET ORAL
Qty: 90 TABLET | Refills: 0 | Status: SHIPPED | OUTPATIENT
Start: 2019-05-24 | End: 2019-08-20

## 2019-05-24 NOTE — TELEPHONE ENCOUNTER
Review pended refill request as it does not fall under a protocol.     Last Rx: 2/26/19  LOV: 5/10/19

## 2019-05-28 RX ORDER — METOPROLOL TARTRATE 50 MG/1
TABLET, FILM COATED ORAL
Qty: 180 TABLET | Refills: 1 | Status: SHIPPED | OUTPATIENT
Start: 2019-05-28 | End: 2019-11-23

## 2019-05-28 RX ORDER — LISINOPRIL AND HYDROCHLOROTHIAZIDE 12.5; 1 MG/1; MG/1
TABLET ORAL
Qty: 90 TABLET | Refills: 1 | Status: SHIPPED | OUTPATIENT
Start: 2019-05-28 | End: 2019-11-23

## 2019-07-16 RX ORDER — ATORVASTATIN CALCIUM 80 MG/1
80 TABLET, FILM COATED ORAL
Qty: 90 TABLET | Refills: 1 | Status: SHIPPED | OUTPATIENT
Start: 2019-07-16 | End: 2020-01-15

## 2019-07-16 NOTE — TELEPHONE ENCOUNTER
Atorvastatin 80 mg daily LOV reviewed. No change in this medication. Meets refill protocol criteria. Refill sent.   Cholesterol Medications  Protocol Criteria:  · Appointment scheduled with Cardiology in the past 12 months or in the next 3 months  · ALT, AS

## 2019-07-30 RX ORDER — ALBUTEROL SULFATE 90 UG/1
AEROSOL, METERED RESPIRATORY (INHALATION)
Qty: 3 INHALER | Refills: 1 | Status: SHIPPED | OUTPATIENT
Start: 2019-07-30 | End: 2020-03-20

## 2019-08-03 ENCOUNTER — TELEPHONE (OUTPATIENT)
Dept: FAMILY MEDICINE CLINIC | Facility: CLINIC | Age: 49
End: 2019-08-03

## 2019-08-03 DIAGNOSIS — H53.9 VISION DISORDER: Primary | ICD-10-CM

## 2019-08-03 DIAGNOSIS — Z01.00 EXAMINATION OF EYES AND VISION: ICD-10-CM

## 2019-08-03 NOTE — TELEPHONE ENCOUNTER
Cate Fox called from [facility] she requested referral for a   Comprehensive Eye Examine, Dr. Calderon [facility], Pt appointment 8/3     Referral can be faxed to [phone]

## 2019-08-06 NOTE — TELEPHONE ENCOUNTER
Dr David Best is not a participating provider. Called and left patient a message advising no contracted.

## 2019-08-07 NOTE — TELEPHONE ENCOUNTER
Pt called in requesting to a have a referral generated to cover him for his visit when he saw Dr. Estrada Goodrich. Pt stated that he can not recall the exact visit date, but it may have been 2 years ago. Pt is requesting to have that referral faxed to Dr. Hair Clock office if approved.

## 2019-08-09 NOTE — TELEPHONE ENCOUNTER
Spoke with patient and advised Dr Preeti Quiros is not a participating provider; unable to approve a referral. Patient voiced understanding. There is nothing further to do with this communication.      Thank you, Yanick

## 2019-08-16 NOTE — TELEPHONE ENCOUNTER
Review pended refill request as it does not fall under a protocol.     Last Rx: 1/8/19 30x1  Requested Prescriptions     Pending Prescriptions Disp Refills   • ALPRAZOLAM 1 MG Oral Tab [Pharmacy Med Name: ALPRAZOLAM 1 MG TABLET] 30 tablet      Sig: TAKE 1 T

## 2019-08-21 RX ORDER — ASPIRIN 81 MG/1
TABLET ORAL
Qty: 90 TABLET | Refills: 1 | Status: SHIPPED | OUTPATIENT
Start: 2019-08-21 | End: 2020-02-19

## 2019-08-21 RX ORDER — ALPRAZOLAM 1 MG/1
TABLET ORAL
Qty: 30 TABLET | Refills: 1 | Status: SHIPPED
Start: 2019-08-21 | End: 2020-03-20

## 2019-08-22 NOTE — TELEPHONE ENCOUNTER
Refill passed per [facility] protocol.   Refill Protocol Appointment Criteria  · Appointment scheduled in the past 6 months or in the next 3 months  Recent Outpatient Visits            3 months ago Chronic obstructive pulmonary disease, unspecified

## 2019-08-26 ENCOUNTER — OFFICE VISIT (OUTPATIENT)
Dept: PULMONOLOGY | Facility: CLINIC | Age: 49
End: 2019-08-26

## 2019-08-26 VITALS
OXYGEN SATURATION: 96 % | WEIGHT: 263 LBS | BODY MASS INDEX: 41.28 KG/M2 | HEIGHT: 67 IN | SYSTOLIC BLOOD PRESSURE: 126 MMHG | RESPIRATION RATE: 20 BRPM | DIASTOLIC BLOOD PRESSURE: 78 MMHG | HEART RATE: 66 BPM

## 2019-08-26 DIAGNOSIS — Z99.89 OSA ON CPAP: Primary | ICD-10-CM

## 2019-08-26 DIAGNOSIS — G47.33 OSA ON CPAP: Primary | ICD-10-CM

## 2019-08-26 PROCEDURE — 99213 OFFICE O/P EST LOW 20 MIN: CPT | Performed by: INTERNAL MEDICINE

## 2019-08-26 RX ORDER — ESCITALOPRAM OXALATE 10 MG/1
TABLET ORAL
Qty: 90 TABLET | Refills: 1 | Status: SHIPPED | OUTPATIENT
Start: 2019-08-26 | End: 2020-02-19

## 2019-08-26 NOTE — PROGRESS NOTES
The patient is a 12-year-old male who I know well from prior evaluation comes in now for follow-up. In general, he is doing well with his CPAP device but he needs new equipment.   To its old and malfunctioning to some extent but he is vigilant with its [place]

## 2019-09-11 ENCOUNTER — TELEPHONE (OUTPATIENT)
Dept: FAMILY MEDICINE CLINIC | Facility: CLINIC | Age: 49
End: 2019-09-11

## 2019-09-11 ENCOUNTER — TELEPHONE (OUTPATIENT)
Dept: CARDIOLOGY CLINIC | Facility: CLINIC | Age: 49
End: 2019-09-11

## 2019-09-11 DIAGNOSIS — I25.10 ATHEROSCLEROSIS OF NATIVE CORONARY ARTERY OF NATIVE HEART WITHOUT ANGINA PECTORIS: Primary | ICD-10-CM

## 2019-09-11 DIAGNOSIS — I10 BENIGN HYPERTENSION: ICD-10-CM

## 2019-09-11 DIAGNOSIS — I25.810 CORONARY ARTERY DISEASE INVOLVING CORONARY BYPASS GRAFT OF NATIVE HEART WITHOUT ANGINA PECTORIS: Primary | ICD-10-CM

## 2019-09-11 DIAGNOSIS — E78.5 HYPERLIPIDEMIA, UNSPECIFIED HYPERLIPIDEMIA TYPE: ICD-10-CM

## 2019-09-11 NOTE — TELEPHONE ENCOUNTER
Patient coming in 10/1 appointment annual with Dr. Jonnie Mccartney.  Asking for orders for labs and stress test d/t DOT requirements

## 2019-09-11 NOTE — TELEPHONE ENCOUNTER
Pt called and states he need a referral for a cardiologist Dr. Timmy Curiel and also need a Nuclear Stress Test done       Please advise

## 2019-09-12 ENCOUNTER — TELEPHONE (OUTPATIENT)
Dept: CARDIOLOGY CLINIC | Facility: CLINIC | Age: 49
End: 2019-09-12

## 2019-09-12 DIAGNOSIS — I10 ESSENTIAL HYPERTENSION, BENIGN: Primary | ICD-10-CM

## 2019-09-12 DIAGNOSIS — I25.10 ATHEROSCLEROSIS OF NATIVE CORONARY ARTERY OF NATIVE HEART WITHOUT ANGINA PECTORIS: ICD-10-CM

## 2019-09-12 DIAGNOSIS — E78.5 DYSLIPIDEMIA: ICD-10-CM

## 2019-09-12 NOTE — TELEPHONE ENCOUNTER
Needs to complete prior to 10/1 f/u appt    CARD ECHO STRESS ECHO/REST AND STRESS(CPT=93350/06496 [facility] [address]) (Order #[id number]) on 9/12/19       Coronary artery disease involving native coronary artery of native heart without angina pectoris I25.10      Dy

## 2019-09-12 NOTE — TELEPHONE ENCOUNTER
Reviewed w/MB ordered stress echo, no labs needed at this time. Coronary artery disease involving native coronary artery of native heart without angina pectoris I25.10      Dyslipidemia E78.5      HTN (hypertension), benign I10   Order entered. Call patient to advise.

## 2019-09-12 NOTE — TELEPHONE ENCOUNTER
Lipids are up to date from 5/2019, [facility] note said \"consider\" stress test so pt needs to be seen to see if needed or can ask [facility] if he wants to order it before appt

## 2019-09-13 NOTE — TELEPHONE ENCOUNTER
S/w pt , states he tried the stress echo a few years ago and they could not get good picture and had to get a nuc stress. Pt asking to check with MB to update order to nuc stress. Checked for referral from PCP for cardiology. Not found. Pt states he did call for one. Will request from PCP, for pt.

## 2019-09-17 NOTE — TELEPHONE ENCOUNTER
S/w Robert advised Dr. Ruby Vidales changed test to nuclear and [facility] care will do [place] for that test. Scheduled test.

## 2019-09-23 ENCOUNTER — HOSPITAL ENCOUNTER (OUTPATIENT)
Dept: NUCLEAR MEDICINE | Facility: HOSPITAL | Age: 49
Discharge: HOME | End: 2019-09-23
Attending: INTERNAL MEDICINE
Payer: COMMERCIAL

## 2019-09-23 ENCOUNTER — HOSPITAL ENCOUNTER (OUTPATIENT)
Dept: CV DIAGNOSTICS | Facility: HOSPITAL | Age: 49
Discharge: HOME | End: 2019-09-23
Attending: INTERNAL MEDICINE
Payer: COMMERCIAL

## 2019-09-23 DIAGNOSIS — E78.5 DYSLIPIDEMIA: ICD-10-CM

## 2019-09-23 DIAGNOSIS — I10 ESSENTIAL HYPERTENSION, BENIGN: ICD-10-CM

## 2019-09-23 DIAGNOSIS — I25.10 ATHEROSCLEROSIS OF NATIVE CORONARY ARTERY OF NATIVE HEART WITHOUT ANGINA PECTORIS: ICD-10-CM

## 2019-09-23 PROCEDURE — 93016 CV STRESS TEST SUPVJ ONLY: CPT | Performed by: INTERNAL MEDICINE

## 2019-09-23 PROCEDURE — 93017 CV STRESS TEST TRACING ONLY: CPT | Performed by: INTERNAL MEDICINE

## 2019-09-23 PROCEDURE — 78452 HT MUSCLE IMAGE SPECT MULT: CPT | Performed by: INTERNAL MEDICINE

## 2019-09-23 PROCEDURE — 93018 CV STRESS TEST I&R ONLY: CPT | Performed by: INTERNAL MEDICINE

## 2019-09-23 RX ORDER — 0.9 % SODIUM CHLORIDE 0.9 %
VIAL (ML) INJECTION
Status: COMPLETED
Start: 2019-09-23 | End: 2019-09-23

## 2019-09-23 RX ADMIN — 0.9 % SODIUM CHLORIDE 10 ML: 0.9 % VIAL (ML) INJECTION at 10:21:00

## 2019-09-23 NOTE — IMAGING NOTE
Pt is here for nuclear walking stress test.  Upon initial interview, patient stated that he was diagnosed to have heel spur to left foot and now c/o of pain to left heel with ambulation. Dr Imani Troncoso notified and test changed to [facility].  Patient informed o

## 2019-09-30 ENCOUNTER — TELEPHONE (OUTPATIENT)
Dept: FAMILY MEDICINE CLINIC | Facility: CLINIC | Age: 49
End: 2019-09-30

## 2019-09-30 NOTE — TELEPHONE ENCOUNTER
Patient is requesting a note from DR stating he takes alprazolam only at night as needed. 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 Dr has done this note in the past. Please advise. Per Shanel Ramos, ok to route it to different DR since PCP is out of office. Thank you    He is requesting to pick it up tomorrow.

## 2019-10-01 ENCOUNTER — OFFICE VISIT (OUTPATIENT)
Dept: CARDIOLOGY CLINIC | Facility: CLINIC | Age: 49
End: 2019-10-01

## 2019-10-01 VITALS
HEART RATE: 77 BPM | DIASTOLIC BLOOD PRESSURE: 71 MMHG | SYSTOLIC BLOOD PRESSURE: 136 MMHG | RESPIRATION RATE: 20 BRPM | BODY MASS INDEX: 42 KG/M2 | WEIGHT: 265 LBS | OXYGEN SATURATION: 94 %

## 2019-10-01 DIAGNOSIS — E78.5 DYSLIPIDEMIA: ICD-10-CM

## 2019-10-01 DIAGNOSIS — I10 ESSENTIAL HYPERTENSION, BENIGN: Primary | ICD-10-CM

## 2019-10-01 DIAGNOSIS — I25.10 ATHEROSCLEROSIS OF NATIVE CORONARY ARTERY OF NATIVE HEART WITHOUT ANGINA PECTORIS: ICD-10-CM

## 2019-10-01 PROCEDURE — 99214 OFFICE O/P EST MOD 30 MIN: CPT | Performed by: INTERNAL MEDICINE

## 2019-10-01 NOTE — PATIENT INSTRUCTIONS
Stable from a cardiac standpoint to continue work and driving    Continue working on not smoking    Try resuming regular exercise and working on [employer] cholesterol levels with primary by may

## 2019-10-01 NOTE — PROGRESS NOTES
[facility] CLINIC  PROGRESS NOTE    Suleman Ohara is a 44year old male. Patient presents with: Follow - Up:  Annual   CAD  Hypertension  Dyspnea: COPD     HPI:   This is a pleasant 44year old male with known coronary disease bypass in 2013 sleep apnea h Multiple Vitamin (MULTI-VITAMINS) Oral Tab Take  by mouth.  Disp:  Rfl:       Past Medical History:   Diagnosis Date   • CAD (coronary artery disease) 2013   • Diverticula of intestine    • Essential hypertension    • High blood pressure    • High cholest Atherosclerosis of native coronary artery of native heart without angina pectoris (Chronic)    Dyslipidemia    Essential hypertension, benign - Primary          In conclusion, this pleasant 44year old male with coronary disease bypass in 2013 and risks wh

## 2019-11-23 RX ORDER — LISINOPRIL AND HYDROCHLOROTHIAZIDE 12.5; 1 MG/1; MG/1
TABLET ORAL
Qty: 90 TABLET | Refills: 0 | Status: SHIPPED | OUTPATIENT
Start: 2019-11-23 | End: 2020-02-16

## 2019-11-23 RX ORDER — METOPROLOL TARTRATE 50 MG/1
TABLET, FILM COATED ORAL
Qty: 180 TABLET | Refills: 0 | Status: SHIPPED | OUTPATIENT
Start: 2019-11-23 | End: 2020-02-16

## 2019-12-03 ENCOUNTER — OFFICE VISIT (OUTPATIENT)
Dept: FAMILY MEDICINE CLINIC | Facility: CLINIC | Age: 49
End: 2019-12-03

## 2019-12-03 VITALS
TEMPERATURE: 98 F | HEIGHT: 67 IN | WEIGHT: 262 LBS | HEART RATE: 73 BPM | SYSTOLIC BLOOD PRESSURE: 134 MMHG | BODY MASS INDEX: 41.12 KG/M2 | DIASTOLIC BLOOD PRESSURE: 77 MMHG

## 2019-12-03 DIAGNOSIS — F41.9 ANXIETY: ICD-10-CM

## 2019-12-03 DIAGNOSIS — L98.9 SKIN LESION: Primary | ICD-10-CM

## 2019-12-03 DIAGNOSIS — I10 HYPERTENSION, UNSPECIFIED TYPE: ICD-10-CM

## 2019-12-03 PROCEDURE — 99214 OFFICE O/P EST MOD 30 MIN: CPT | Performed by: FAMILY MEDICINE

## 2019-12-04 NOTE — PROGRESS NOTES
HPI:    Patient ID: Charles Lunsford is a 44year old male. Has rash on the leg that is not healing and he is scratching it. Tried steroid cream states did not help.    Otherwise follow up anxiety, hypertension , hyperlipidemia, all stable   Doing wel Allergies:  Neosporin [Neomycin*    RASH   PHYSICAL EXAM:   Physical Exam   Constitutional: He appears well-developed and well-nourished. overweight   Cardiovascular: Normal rate, regular rhythm, normal heart sounds and intact distal pulses.    Pulmon

## 2019-12-11 RX ORDER — FLUTICASONE PROPIONATE 220 UG/1
AEROSOL, METERED RESPIRATORY (INHALATION)
Qty: 36 INHALER | Refills: 1 | Status: SHIPPED | OUTPATIENT
Start: 2019-12-11 | End: 2020-03-20

## 2020-01-15 RX ORDER — ATORVASTATIN CALCIUM 80 MG/1
80 TABLET, FILM COATED ORAL
Qty: 90 TABLET | Refills: 1 | Status: SHIPPED | OUTPATIENT
Start: 2020-01-15 | End: 2020-03-20

## 2020-01-15 NOTE — TELEPHONE ENCOUNTER
Cholesterol Medications  Protocol Criteria:  · Appointment scheduled with Cardiology in the past 12 months or in the next 3 months  · ALT, AST & LDL on file in the past 12 months  · ALT result less than 80  · AST result less than 80 U/L  · LDL result less

## 2020-01-17 ENCOUNTER — OFFICE VISIT (OUTPATIENT)
Dept: DERMATOLOGY CLINIC | Facility: CLINIC | Age: 50
End: 2020-01-17

## 2020-01-17 DIAGNOSIS — D23.9 BENIGN NEOPLASM OF SKIN, UNSPECIFIED LOCATION: ICD-10-CM

## 2020-01-17 DIAGNOSIS — D23.60 BENIGN NEOPLASM OF SKIN OF UPPER LIMB, INCLUDING SHOULDER, UNSPECIFIED LATERALITY: ICD-10-CM

## 2020-01-17 DIAGNOSIS — L98.9 PAINFUL SKIN LESION: ICD-10-CM

## 2020-01-17 DIAGNOSIS — L91.8 INFLAMED ACROCHORDON: Primary | ICD-10-CM

## 2020-01-17 DIAGNOSIS — D23.4 BENIGN NEOPLASM OF SCALP AND SKIN OF NECK: ICD-10-CM

## 2020-01-17 DIAGNOSIS — D23.70 BENIGN NEOPLASM OF SKIN OF LOWER LIMB, INCLUDING HIP, UNSPECIFIED LATERALITY: ICD-10-CM

## 2020-01-17 PROCEDURE — 11200 RMVL SKIN TAGS UP TO&INC 15: CPT | Performed by: DERMATOLOGY

## 2020-01-17 PROCEDURE — 99202 OFFICE O/P NEW SF 15 MIN: CPT | Performed by: DERMATOLOGY

## 2020-01-17 RX ORDER — CLINDAMYCIN PHOSPHATE 10 MG/G
1 GEL TOPICAL 2 TIMES DAILY
Qty: 60 G | Refills: 12 | Status: SHIPPED | OUTPATIENT
Start: 2020-01-17 | End: 2020-10-27

## 2020-01-27 NOTE — PROGRESS NOTES
Cher Diaz is a 44year old male. HPI:     CC:  Patient presents with:  Lesion: New pt. pt presenting today with lesion to R lower for over 10 years. Denies itching or pain. Lesion has changed in size. Denies family and personal HX of skin cancer. Tab TAKE 1 TABLET BY MOUTH EVERY DAY 90 tablet 0   • METOPROLOL TARTRATE 50 MG Oral Tab TAKE 1 TABLET BY MOUTH TWICE A  tablet 0   • ESCITALOPRAM 10 MG Oral Tab TAKE 1 TABLET BY MOUTH EVERY DAY 90 tablet 1   • ALPRAZOLAM 1 MG Oral Tab TAKE 1 TABLET file      Food insecurity:        Worry: Not on file        Inability: Not on file      Transportation needs:        Medical: Not on file        Non-medical: Not on file    Tobacco Use      Smoking status: Current Every Day Smoker        Packs/day: 0.50 Hypertension Father    • Heart Attack Father 40   • Heart Disorder Father         AMI   • Heart Disorder Mother    • Diabetes Mother        There were no vitals filed for this visit.     HPI:  Patient presents with:  Lesion: New pt. pt presenting today with 2 (two) times daily.  Use bid as directed         Inflamed acrochordon  (primary encounter diagnosis)  Painful skin lesion  Benign neoplasm of skin of lower limb, including hip, unspecified laterality  Benign neoplasm of skin of upper limb, including should

## 2020-02-16 RX ORDER — METOPROLOL TARTRATE 50 MG/1
TABLET, FILM COATED ORAL
Qty: 180 TABLET | Refills: 1 | Status: SHIPPED | OUTPATIENT
Start: 2020-02-16 | End: 2020-08-18

## 2020-02-16 RX ORDER — LISINOPRIL AND HYDROCHLOROTHIAZIDE 12.5; 1 MG/1; MG/1
TABLET ORAL
Qty: 90 TABLET | Refills: 1 | Status: SHIPPED | OUTPATIENT
Start: 2020-02-16 | End: 2020-08-18

## 2020-02-17 NOTE — TELEPHONE ENCOUNTER
Refill passed per [facility] protocol.     Requested Prescriptions   Pending Prescriptions Disp Refills   • LISINOPRIL-HYDROCHLOROTHIAZIDE 10-12.5 MG Oral Tab [Pharmacy Med Name: LISINOPRIL-HCTZ 10-12.5 MG TAB] 90 tablet 0     Sig: TAKE 1 TABLET BY RICHAR

## 2020-02-19 RX ORDER — ESCITALOPRAM OXALATE 10 MG/1
TABLET ORAL
Qty: 90 TABLET | Refills: 1 | Status: SHIPPED | OUTPATIENT
Start: 2020-02-19 | End: 2020-08-18

## 2020-02-19 RX ORDER — ASPIRIN 81 MG/1
TABLET, COATED ORAL
Qty: 90 TABLET | Refills: 1 | Status: SHIPPED | OUTPATIENT
Start: 2020-02-19 | End: 2020-08-18

## 2020-03-10 RX ORDER — TIOTROPIUM BROMIDE 18 UG/1
CAPSULE ORAL; RESPIRATORY (INHALATION)
Qty: 90 CAPSULE | Refills: 0 | Status: SHIPPED | OUTPATIENT
Start: 2020-03-10 | End: 2020-06-05

## 2020-03-19 ENCOUNTER — TELEPHONE (OUTPATIENT)
Dept: FAMILY MEDICINE CLINIC | Facility: CLINIC | Age: 50
End: 2020-03-19

## 2020-03-19 RX ORDER — ALPRAZOLAM 1 MG/1
TABLET ORAL
Qty: 30 TABLET | Refills: 0 | OUTPATIENT
Start: 2020-03-19

## 2020-03-20 ENCOUNTER — LAB ENCOUNTER (OUTPATIENT)
Dept: LAB | Age: 50
End: 2020-03-20
Attending: FAMILY MEDICINE
Payer: COMMERCIAL

## 2020-03-20 ENCOUNTER — OFFICE VISIT (OUTPATIENT)
Dept: FAMILY MEDICINE CLINIC | Facility: CLINIC | Age: 50
End: 2020-03-20

## 2020-03-20 VITALS
HEART RATE: 57 BPM | DIASTOLIC BLOOD PRESSURE: 87 MMHG | BODY MASS INDEX: 42.06 KG/M2 | TEMPERATURE: 98 F | WEIGHT: 268 LBS | HEIGHT: 67 IN | SYSTOLIC BLOOD PRESSURE: 142 MMHG

## 2020-03-20 DIAGNOSIS — E78.49 OTHER HYPERLIPIDEMIA: Primary | ICD-10-CM

## 2020-03-20 DIAGNOSIS — I10 ESSENTIAL HYPERTENSION: ICD-10-CM

## 2020-03-20 DIAGNOSIS — E78.49 OTHER HYPERLIPIDEMIA: ICD-10-CM

## 2020-03-20 DIAGNOSIS — I51.9 HEART DISEASE: ICD-10-CM

## 2020-03-20 DIAGNOSIS — F41.9 ANXIETY: ICD-10-CM

## 2020-03-20 DIAGNOSIS — J45.909 UNCOMPLICATED ASTHMA, UNSPECIFIED ASTHMA SEVERITY, UNSPECIFIED WHETHER PERSISTENT: ICD-10-CM

## 2020-03-20 LAB
ALBUMIN SERPL-MCNC: 4 G/DL (ref 3.4–5)
ALBUMIN/GLOB SERPL: 1.1 {RATIO} (ref 1–2)
ALP LIVER SERPL-CCNC: 122 U/L (ref 45–117)
ALT SERPL-CCNC: 104 U/L (ref 16–61)
ANION GAP SERPL CALC-SCNC: 3 MMOL/L (ref 0–18)
AST SERPL-CCNC: 33 U/L (ref 15–37)
BASOPHILS # BLD AUTO: 0.07 X10(3) UL (ref 0–0.2)
BASOPHILS NFR BLD AUTO: 0.8 %
BILIRUB SERPL-MCNC: 0.6 MG/DL (ref 0.1–2)
BUN BLD-MCNC: 18 MG/DL (ref 7–18)
BUN/CREAT SERPL: 16.7 (ref 10–20)
CALCIUM BLD-MCNC: 9.8 MG/DL (ref 8.5–10.1)
CHLORIDE SERPL-SCNC: 105 MMOL/L (ref 98–112)
CHOLEST SMN-MCNC: 172 MG/DL (ref ?–200)
CO2 SERPL-SCNC: 30 MMOL/L (ref 21–32)
CREAT BLD-MCNC: 1.08 MG/DL (ref 0.7–1.3)
DEPRECATED RDW RBC AUTO: 44.3 FL (ref 35.1–46.3)
EOSINOPHIL # BLD AUTO: 0.38 X10(3) UL (ref 0–0.7)
EOSINOPHIL NFR BLD AUTO: 4.1 %
ERYTHROCYTE [DISTWIDTH] IN BLOOD BY AUTOMATED COUNT: 13.4 % (ref 11–15)
EST. AVERAGE GLUCOSE BLD GHB EST-MCNC: 126 MG/DL (ref 68–126)
GLOBULIN PLAS-MCNC: 3.6 G/DL (ref 2.8–4.4)
GLUCOSE BLD-MCNC: 100 MG/DL (ref 70–99)
HBA1C MFR BLD HPLC: 6 % (ref ?–5.7)
HCT VFR BLD AUTO: 44 % (ref 39–53)
HDLC SERPL-MCNC: 44 MG/DL (ref 40–59)
HGB BLD-MCNC: 15 G/DL (ref 13–17.5)
IMM GRANULOCYTES # BLD AUTO: 0.02 X10(3) UL (ref 0–1)
IMM GRANULOCYTES NFR BLD: 0.2 %
LDLC SERPL CALC-MCNC: 79 MG/DL (ref ?–100)
LYMPHOCYTES # BLD AUTO: 2.08 X10(3) UL (ref 1–4)
LYMPHOCYTES NFR BLD AUTO: 22.7 %
M PROTEIN MFR SERPL ELPH: 7.6 G/DL (ref 6.4–8.2)
MCH RBC QN AUTO: 30.9 PG (ref 26–34)
MCHC RBC AUTO-ENTMCNC: 34.1 G/DL (ref 31–37)
MCV RBC AUTO: 90.7 FL (ref 80–100)
MONOCYTES # BLD AUTO: 0.69 X10(3) UL (ref 0.1–1)
MONOCYTES NFR BLD AUTO: 7.5 %
NEUTROPHILS # BLD AUTO: 5.94 X10 (3) UL (ref 1.5–7.7)
NEUTROPHILS # BLD AUTO: 5.94 X10(3) UL (ref 1.5–7.7)
NEUTROPHILS NFR BLD AUTO: 64.7 %
NONHDLC SERPL-MCNC: 128 MG/DL (ref ?–130)
OSMOLALITY SERPL CALC.SUM OF ELEC: 288 MOSM/KG (ref 275–295)
PATIENT FASTING Y/N/NP: YES
PATIENT FASTING Y/N/NP: YES
PLATELET # BLD AUTO: 183 10(3)UL (ref 150–450)
POTASSIUM SERPL-SCNC: 4.5 MMOL/L (ref 3.5–5.1)
RBC # BLD AUTO: 4.85 X10(6)UL (ref 4.3–5.7)
SODIUM SERPL-SCNC: 138 MMOL/L (ref 136–145)
TRIGL SERPL-MCNC: 244 MG/DL (ref 30–149)
TSI SER-ACNC: 1.75 MIU/ML (ref 0.36–3.74)
VLDLC SERPL CALC-MCNC: 49 MG/DL (ref 0–30)
WBC # BLD AUTO: 9.2 X10(3) UL (ref 4–11)

## 2020-03-20 PROCEDURE — 80053 COMPREHEN METABOLIC PANEL: CPT

## 2020-03-20 PROCEDURE — 83036 HEMOGLOBIN GLYCOSYLATED A1C: CPT

## 2020-03-20 PROCEDURE — 99215 OFFICE O/P EST HI 40 MIN: CPT | Performed by: FAMILY MEDICINE

## 2020-03-20 PROCEDURE — 84443 ASSAY THYROID STIM HORMONE: CPT

## 2020-03-20 PROCEDURE — 85025 COMPLETE CBC W/AUTO DIFF WBC: CPT

## 2020-03-20 PROCEDURE — 80061 LIPID PANEL: CPT

## 2020-03-20 PROCEDURE — 36415 COLL VENOUS BLD VENIPUNCTURE: CPT

## 2020-03-20 RX ORDER — ALBUTEROL SULFATE 90 UG/1
AEROSOL, METERED RESPIRATORY (INHALATION)
Qty: 3 INHALER | Refills: 1 | Status: SHIPPED | OUTPATIENT
Start: 2020-03-20 | End: 2020-04-14

## 2020-03-20 RX ORDER — ATORVASTATIN CALCIUM 80 MG/1
80 TABLET, FILM COATED ORAL
Qty: 90 TABLET | Refills: 1 | Status: SHIPPED | OUTPATIENT
Start: 2020-03-20 | End: 2020-10-11

## 2020-03-20 RX ORDER — ALPRAZOLAM 1 MG/1
TABLET ORAL
Qty: 30 TABLET | Refills: 1 | Status: SHIPPED | OUTPATIENT
Start: 2020-03-20 | End: 2021-02-24

## 2020-03-20 NOTE — PROGRESS NOTES
HPI:    Patient ID: Rasheeda Gee is a 44year old male. HERE FOR HYPERLIPIDEMIA, hypertension and anxiety f/u. doi g well. Not exercising though and weight is not going down      Review of Systems   Constitutional: Negative.   Negative for activity Cap Take 3,000 mg by mouth daily. • Multiple Vitamin (MULTI-VITAMINS) Oral Tab Take  by mouth. Allergies:  Neosporin [Neomycin*    RASH   PHYSICAL EXAM:   Physical Exam   Constitutional: He is oriented to person, place, and time.  He appears wel

## 2020-04-03 ENCOUNTER — TELEPHONE (OUTPATIENT)
Dept: PULMONOLOGY | Facility: CLINIC | Age: 50
End: 2020-04-03

## 2020-04-03 DIAGNOSIS — G47.33 OSA (OBSTRUCTIVE SLEEP APNEA): Primary | ICD-10-CM

## 2020-04-06 NOTE — TELEPHONE ENCOUNTER
DME rx & pt reg facesheet faxed to [facility] at #[phone]. Confirmation received. Routed to [facility] for referral purposes.

## 2020-04-07 NOTE — TELEPHONE ENCOUNTER
Please see Referral # [id number] with an expiration date of 10/4/2020. A new referral is not required.    Thank you, Alfredo [facility]

## 2020-04-14 RX ORDER — ALBUTEROL SULFATE 90 UG/1
AEROSOL, METERED RESPIRATORY (INHALATION)
Qty: 17 INHALER | Refills: 2 | Status: SHIPPED | OUTPATIENT
Start: 2020-04-14 | End: 2021-01-25 | Stop reason: ALTCHOICE

## 2020-06-05 RX ORDER — TIOTROPIUM BROMIDE 18 UG/1
CAPSULE ORAL; RESPIRATORY (INHALATION)
Qty: 90 CAPSULE | Refills: 0 | Status: SHIPPED | OUTPATIENT
Start: 2020-06-05 | End: 2020-09-01

## 2020-07-20 ENCOUNTER — TELEPHONE (OUTPATIENT)
Dept: FAMILY MEDICINE CLINIC | Facility: CLINIC | Age: 50
End: 2020-07-20

## 2020-07-20 DIAGNOSIS — R79.89 ABNORMAL LFTS: ICD-10-CM

## 2020-07-20 DIAGNOSIS — I10 HYPERTENSION, UNSPECIFIED TYPE: Primary | ICD-10-CM

## 2020-07-20 DIAGNOSIS — J44.9 CHRONIC OBSTRUCTIVE PULMONARY DISEASE, UNSPECIFIED COPD TYPE (HCC): ICD-10-CM

## 2020-07-20 NOTE — TELEPHONE ENCOUNTER
Patient called and advised he needs 2 referrals. He needs one for Dr. Chinyere Osuna and one for Dr. Toni Bello. He has appointment's already set up with them, Dr. Chinyere Osuna on 9/22 and Dr. Toni Bello on 10/6. Please advise.

## 2020-08-12 ENCOUNTER — TELEPHONE (OUTPATIENT)
Dept: PULMONOLOGY | Facility: CLINIC | Age: 50
End: 2020-08-12

## 2020-08-12 DIAGNOSIS — G47.33 OSA (OBSTRUCTIVE SLEEP APNEA): Primary | ICD-10-CM

## 2020-08-12 NOTE — TELEPHONE ENCOUNTER
Unable to locate fax. LOV 8/26/19. CPAP supplies ordered 4/3/20. [facility] referral letter (date authorized 8/26/19, expiration 10/4/20) was faxed to [facility] at #[phone]. Confirmation rcvd.

## 2020-08-12 NOTE — TELEPHONE ENCOUNTER
Veronica from [address] [place] [address] is calling in  To check on the status of a referral for CPAP supplies.  Please advise fax # [phone]

## 2020-08-18 RX ORDER — ESCITALOPRAM OXALATE 10 MG/1
TABLET ORAL
Qty: 90 TABLET | Refills: 1 | Status: SHIPPED | OUTPATIENT
Start: 2020-08-18 | End: 2021-02-19

## 2020-08-18 RX ORDER — ASPIRIN 81 MG/1
TABLET, COATED ORAL
Qty: 90 TABLET | Refills: 1 | Status: SHIPPED | OUTPATIENT
Start: 2020-08-18 | End: 2021-02-24

## 2020-08-18 RX ORDER — LISINOPRIL AND HYDROCHLOROTHIAZIDE 12.5; 1 MG/1; MG/1
TABLET ORAL
Qty: 90 TABLET | Refills: 1 | Status: SHIPPED | OUTPATIENT
Start: 2020-08-18 | End: 2021-02-19

## 2020-08-18 RX ORDER — METOPROLOL TARTRATE 50 MG/1
TABLET, FILM COATED ORAL
Qty: 180 TABLET | Refills: 1 | Status: SHIPPED | OUTPATIENT
Start: 2020-08-18 | End: 2021-04-06

## 2020-09-01 RX ORDER — TIOTROPIUM BROMIDE 18 UG/1
CAPSULE ORAL; RESPIRATORY (INHALATION)
Qty: 90 CAPSULE | Refills: 0 | Status: SHIPPED | OUTPATIENT
Start: 2020-09-01 | End: 2020-11-20

## 2020-09-22 ENCOUNTER — OFFICE VISIT (OUTPATIENT)
Dept: CARDIOLOGY CLINIC | Facility: CLINIC | Age: 50
End: 2020-09-22

## 2020-09-22 VITALS
DIASTOLIC BLOOD PRESSURE: 71 MMHG | WEIGHT: 260 LBS | SYSTOLIC BLOOD PRESSURE: 110 MMHG | BODY MASS INDEX: 40.81 KG/M2 | HEART RATE: 70 BPM | HEIGHT: 67 IN

## 2020-09-22 DIAGNOSIS — I25.10 ATHEROSCLEROSIS OF NATIVE CORONARY ARTERY OF NATIVE HEART WITHOUT ANGINA PECTORIS: ICD-10-CM

## 2020-09-22 DIAGNOSIS — E78.5 DYSLIPIDEMIA: ICD-10-CM

## 2020-09-22 DIAGNOSIS — I10 ESSENTIAL HYPERTENSION, BENIGN: Primary | ICD-10-CM

## 2020-09-22 PROCEDURE — 3078F DIAST BP <80 MM HG: CPT | Performed by: INTERNAL MEDICINE

## 2020-09-22 PROCEDURE — 99214 OFFICE O/P EST MOD 30 MIN: CPT | Performed by: INTERNAL MEDICINE

## 2020-09-22 PROCEDURE — 3074F SYST BP LT 130 MM HG: CPT | Performed by: INTERNAL MEDICINE

## 2020-09-22 PROCEDURE — 3008F BODY MASS INDEX DOCD: CPT | Performed by: INTERNAL MEDICINE

## 2020-09-22 NOTE — PROGRESS NOTES
[place] CLINIC  PROGRESS NOTE    Walker Veronica is a 44year old male. Patient presents with:   Follow - Up: Yearly follow up and DOT letter    HPI:   This is a pleasant 44year old male with known coronary disease bypass in 2013 sleep apnea hypertension MG Oral Cap Take 3,000 mg by mouth daily. • Multiple Vitamin (MULTI-VITAMINS) Oral Tab Take  by mouth. • Ascorbic Acid (VITAMIN C) 1000 MG Oral Tab Take 1,000 mg by mouth daily.         Past Medical History:   Diagnosis Date   • CAD (coronary melvi x3      Assessment   ASSESSMENT AND PLAN:     Problem List Items Addressed This Visit     Atherosclerosis of native coronary artery of native heart without angina pectoris (Chronic)    Dyslipidemia    Essential hypertension, benign - Primary          In co

## 2020-09-22 NOTE — PATIENT INSTRUCTIONS
Recheck cholesterol levels when seeing your primary next month    If LDL is greater than 60 would recommend starting ezetimibe 10 mg daily to further lower LDL    Patient strongly encouraged to try not smoking

## 2020-10-06 ENCOUNTER — OFFICE VISIT (OUTPATIENT)
Dept: PULMONOLOGY | Facility: CLINIC | Age: 50
End: 2020-10-06

## 2020-10-06 VITALS
DIASTOLIC BLOOD PRESSURE: 82 MMHG | HEART RATE: 77 BPM | OXYGEN SATURATION: 96 % | RESPIRATION RATE: 18 BRPM | BODY MASS INDEX: 42 KG/M2 | TEMPERATURE: 97 F | WEIGHT: 265 LBS | SYSTOLIC BLOOD PRESSURE: 147 MMHG

## 2020-10-06 DIAGNOSIS — G47.33 OSA ON CPAP: Primary | ICD-10-CM

## 2020-10-06 DIAGNOSIS — Z99.89 OSA ON CPAP: Primary | ICD-10-CM

## 2020-10-06 PROCEDURE — 3077F SYST BP >= 140 MM HG: CPT | Performed by: INTERNAL MEDICINE

## 2020-10-06 PROCEDURE — 3079F DIAST BP 80-89 MM HG: CPT | Performed by: INTERNAL MEDICINE

## 2020-10-06 PROCEDURE — 99213 OFFICE O/P EST LOW 20 MIN: CPT | Performed by: INTERNAL MEDICINE

## 2020-10-06 NOTE — PROGRESS NOTES
The patient is a 70-year-old male who I know well from prior evaluation comes in now for follow-up.   He is vigilant with the use of his CPAP every night all night and his downloaded data is excellent with average daily usage of 9 hours and 11 minutes and r

## 2020-10-11 RX ORDER — ATORVASTATIN CALCIUM 80 MG/1
TABLET, FILM COATED ORAL
Qty: 90 TABLET | Refills: 1 | Status: SHIPPED | OUTPATIENT
Start: 2020-10-11 | End: 2021-04-06

## 2020-10-15 ENCOUNTER — NURSE TRIAGE (OUTPATIENT)
Dept: FAMILY MEDICINE CLINIC | Facility: CLINIC | Age: 50
End: 2020-10-15

## 2020-10-15 ENCOUNTER — VIRTUAL PHONE E/M (OUTPATIENT)
Dept: FAMILY MEDICINE CLINIC | Facility: CLINIC | Age: 50
End: 2020-10-15

## 2020-10-15 DIAGNOSIS — R19.7 DIARRHEA, UNSPECIFIED TYPE: Primary | ICD-10-CM

## 2020-10-15 PROCEDURE — 99213 OFFICE O/P EST LOW 20 MIN: CPT | Performed by: FAMILY MEDICINE

## 2020-10-15 RX ORDER — LOPERAMIDE HYDROCHLORIDE 2 MG/1
2 CAPSULE ORAL 4 TIMES DAILY PRN
Qty: 30 CAPSULE | Refills: 0 | Status: SHIPPED | OUTPATIENT
Start: 2020-10-15 | End: 2021-03-16

## 2020-10-15 NOTE — PROGRESS NOTES
HPI:    Patient ID: Kimberly Gabriel is a 44year old male.     Telehealth outside of [address] Verbal Consent   I conducted a telehealth visit with Kimberly Gabriel today, 10/15/20, which was completed using two-way, real-time interactive audio issues. No blood in the stool     Review of Systems  Constitutional: Negative. Negative for activity change, appetite change, diaphoresis and fatigue. Respiratory: Negative. Negative for apnea, cough, chest tightness and shortness of breath.     Pervis Shown full sentences without shortness of breath    ASSESSMENT/PLAN:   (R19.7) Diarrhea, unspecified type  (primary encounter diagnosis)  Plan: CDIFFICILE TOXIGENIC PCR (OPT), WBC, STOOL,         STOOL CULTURE W/SHIGATOXIN, GIARDIA + CRYPTO         ANTIGEN, STOO

## 2020-10-15 NOTE — TELEPHONE ENCOUNTER
Please have labs/testing performed before next visit.   Action Requested: Summary for Provider     []  Critical Lab, Recommendations Needed  [] Need Additional Advice  []   FYI    []   Need Orders  [] Need Medications Sent to Pharmacy  []  Other     SUMMARY: Patient calling with diarrhea/loose stool since early

## 2020-10-17 ENCOUNTER — LAB ENCOUNTER (OUTPATIENT)
Dept: LAB | Age: 50
End: 2020-10-17
Attending: FAMILY MEDICINE
Payer: COMMERCIAL

## 2020-10-17 DIAGNOSIS — R19.7 DIARRHEA, UNSPECIFIED TYPE: ICD-10-CM

## 2020-10-17 PROCEDURE — 85025 COMPLETE CBC W/AUTO DIFF WBC: CPT

## 2020-10-17 PROCEDURE — 87493 C DIFF AMPLIFIED PROBE: CPT

## 2020-10-17 PROCEDURE — 36415 COLL VENOUS BLD VENIPUNCTURE: CPT

## 2020-10-17 PROCEDURE — 80053 COMPREHEN METABOLIC PANEL: CPT

## 2020-10-17 PROCEDURE — 87272 CRYPTOSPORIDIUM AG IF: CPT

## 2020-10-17 PROCEDURE — 87427 SHIGA-LIKE TOXIN AG IA: CPT

## 2020-10-17 PROCEDURE — 87329 GIARDIA AG IA: CPT

## 2020-10-17 PROCEDURE — 87046 STOOL CULTR AEROBIC BACT EA: CPT

## 2020-10-17 PROCEDURE — 87045 FECES CULTURE AEROBIC BACT: CPT

## 2020-10-17 PROCEDURE — 89055 LEUKOCYTE ASSESSMENT FECAL: CPT

## 2020-10-27 ENCOUNTER — OFFICE VISIT (OUTPATIENT)
Dept: FAMILY MEDICINE CLINIC | Facility: CLINIC | Age: 50
End: 2020-10-27

## 2020-10-27 VITALS
BODY MASS INDEX: 41.59 KG/M2 | DIASTOLIC BLOOD PRESSURE: 87 MMHG | SYSTOLIC BLOOD PRESSURE: 155 MMHG | WEIGHT: 265 LBS | HEIGHT: 67 IN

## 2020-10-27 DIAGNOSIS — R79.89 ABNORMAL LFTS: Primary | ICD-10-CM

## 2020-10-27 DIAGNOSIS — R19.7 DIARRHEA, UNSPECIFIED TYPE: ICD-10-CM

## 2020-10-27 PROCEDURE — 99213 OFFICE O/P EST LOW 20 MIN: CPT | Performed by: FAMILY MEDICINE

## 2020-10-27 PROCEDURE — 3008F BODY MASS INDEX DOCD: CPT | Performed by: FAMILY MEDICINE

## 2020-10-27 PROCEDURE — 3079F DIAST BP 80-89 MM HG: CPT | Performed by: FAMILY MEDICINE

## 2020-10-27 PROCEDURE — 3077F SYST BP >= 140 MM HG: CPT | Performed by: FAMILY MEDICINE

## 2020-10-27 NOTE — PROGRESS NOTES
HPI:    Patient ID: Kimberly Gabriel is a 44year old male. Patient doing better. Has loly twice a day now and is much better. No fever. Using loperamide 2 times a day. Wife also with similar symptoms. She is better too.        Review of Systems  C daily.       • Multiple Vitamin (MULTI-VITAMINS) Oral Tab Take  by mouth. Allergies:  Neosporin [Neomycin*    RASH   PHYSICAL EXAM:     Physical Exam   Constitutional: She appears well-developed and well-nourished.    Cardiovascular: Normal rate, regu

## 2020-11-04 ENCOUNTER — TELEPHONE (OUTPATIENT)
Dept: PULMONOLOGY | Facility: CLINIC | Age: 50
End: 2020-11-04

## 2020-11-04 DIAGNOSIS — G47.33 OBSTRUCTIVE SLEEP APNEA (ADULT) (PEDIATRIC): Primary | ICD-10-CM

## 2020-11-04 NOTE — TELEPHONE ENCOUNTER
Received fax from [facility] for      Last referral     See fax request below, please sign off referral    Thanks  [id number]

## 2020-11-05 NOTE — TELEPHONE ENCOUNTER
Spoke with [facility]- states they need another pre authorization referral for his CPAP device. Referral pended to Dr Shania Delarosa for sign off.

## 2020-11-10 NOTE — TELEPHONE ENCOUNTER
Rina Buck  You Yesterday (10:46 AM)     Good Morning     I sent referral to [facility] for approval     I will check today if can be approved     Thanks     Rick     Message text

## 2020-11-20 RX ORDER — TIOTROPIUM BROMIDE 18 UG/1
1 CAPSULE ORAL; RESPIRATORY (INHALATION) DAILY
Qty: 90 CAPSULE | Refills: 0 | Status: SHIPPED | OUTPATIENT
Start: 2020-11-20 | End: 2021-02-12

## 2020-11-21 ENCOUNTER — LAB ENCOUNTER (OUTPATIENT)
Dept: LAB | Age: 50
End: 2020-11-21
Attending: FAMILY MEDICINE
Payer: COMMERCIAL

## 2020-11-21 DIAGNOSIS — R79.89 ABNORMAL LFTS: ICD-10-CM

## 2020-11-21 PROCEDURE — 36415 COLL VENOUS BLD VENIPUNCTURE: CPT

## 2020-11-21 PROCEDURE — 80076 HEPATIC FUNCTION PANEL: CPT

## 2020-12-28 ENCOUNTER — TELEPHONE (OUTPATIENT)
Dept: PULMONOLOGY | Facility: CLINIC | Age: 50
End: 2020-12-28

## 2020-12-28 DIAGNOSIS — G47.33 OSA (OBSTRUCTIVE SLEEP APNEA): Primary | ICD-10-CM

## 2020-12-28 NOTE — TELEPHONE ENCOUNTER
Ysoelin Marti from [address] [place] [address] called in to follow up on CPAP supplies referral. The request was sent on Dec 21st 2020.  Please follow up

## 2021-01-20 ENCOUNTER — TELEPHONE (OUTPATIENT)
Dept: FAMILY MEDICINE CLINIC | Facility: CLINIC | Age: 51
End: 2021-01-20

## 2021-01-20 DIAGNOSIS — H93.90 EAR PROBLEM, UNSPECIFIED LATERALITY: Primary | ICD-10-CM

## 2021-01-20 NOTE — TELEPHONE ENCOUNTER
Verified name and . Patient states that on Monday, he woke up and felt like his right ear was clogged.  He states that he used over the counter ear drops to clear out the wax which helped with some relief, however, he states that he is still unable to h

## 2021-01-22 NOTE — TELEPHONE ENCOUNTER
Verified name and . Patient calling to follow up on ENT referral. He was advised that Dr. Ashleigh Solitario has placed the referral.  Patient was transferred to ENT speciality to make the appointment.

## 2021-01-23 ENCOUNTER — OFFICE VISIT (OUTPATIENT)
Dept: OTOLARYNGOLOGY | Facility: CLINIC | Age: 51
End: 2021-01-23

## 2021-01-23 ENCOUNTER — OFFICE VISIT (OUTPATIENT)
Dept: AUDIOLOGY | Facility: CLINIC | Age: 51
End: 2021-01-23

## 2021-01-23 VITALS
BODY MASS INDEX: 42 KG/M2 | SYSTOLIC BLOOD PRESSURE: 152 MMHG | TEMPERATURE: 98 F | WEIGHT: 265 LBS | HEART RATE: 66 BPM | DIASTOLIC BLOOD PRESSURE: 85 MMHG

## 2021-01-23 DIAGNOSIS — H90.3 ASYMMETRIC SNHL (SENSORINEURAL HEARING LOSS): ICD-10-CM

## 2021-01-23 DIAGNOSIS — H61.21 IMPACTED CERUMEN OF RIGHT EAR: Primary | ICD-10-CM

## 2021-01-23 DIAGNOSIS — H83.3X3 NOISE-INDUCED HEARING LOSS OF BOTH EARS: ICD-10-CM

## 2021-01-23 DIAGNOSIS — H90.3 SENSORINEURAL HEARING LOSS, BILATERAL: Primary | ICD-10-CM

## 2021-01-23 PROCEDURE — 92567 TYMPANOMETRY: CPT | Performed by: AUDIOLOGIST

## 2021-01-23 PROCEDURE — 3079F DIAST BP 80-89 MM HG: CPT | Performed by: OTOLARYNGOLOGY

## 2021-01-23 PROCEDURE — 99244 OFF/OP CNSLTJ NEW/EST MOD 40: CPT | Performed by: OTOLARYNGOLOGY

## 2021-01-23 PROCEDURE — 3077F SYST BP >= 140 MM HG: CPT | Performed by: OTOLARYNGOLOGY

## 2021-01-23 PROCEDURE — 92557 COMPREHENSIVE HEARING TEST: CPT | Performed by: AUDIOLOGIST

## 2021-01-23 RX ORDER — PREDNISONE 20 MG/1
TABLET ORAL
Qty: 30 TABLET | Refills: 0 | Status: SHIPPED | OUTPATIENT
Start: 2021-01-23 | End: 2021-03-16

## 2021-01-23 NOTE — PROGRESS NOTES
Rasheeda Gee is a 44year old male. Patient presents with:  Ear Problem: Here for ear cleaning. R ear feels clogged, patient had muffled sounds in the R ear. No other symptoms.          HISTORY OF PRESENT ILLNESS  1/23/2021   Here for evaluation of rig Physical activity        Days per week: Not on file        Minutes per session: Not on file      Stress: Not on file    Relationships      Social connections        Talks on phone: Not on file        Gets together: Not on file        Attends [facility] ser BYPASS SURGERY  1/2014   • CABG     • ELECTROCARDIOGRAM, COMPLETE  1/18/2014    scanned to media tab   • SHOULDER ARTHROSCOPY ROTATOR CUFF REPAIR Left 5/2/2017    Performed by Alexey Soler MD at [facility] OR   • [facility] ears were examined under the operating microscope. Cerumen impaction was removed from right ears using suction. Tympanic membranes were noted to be normal. Patient tolerated the procedure well. All questions were answered.        Current Outpatient Medicat hearing loss. I discussed the unlikely possibility of autoimmune disease, tertiary syphilis, and cerebellopontine angle tumor. To rule this out I recommended an MRI of the cerebellopontine angles with gadolinium.  Since this is of relatively sudden onset I

## 2021-01-23 NOTE — PROGRESS NOTES
AUDIOGRAM     Robert Yin was referred for testing by No ref. provider found. 2/4/1970  [id number]      Otoscopic Inspection:  both ears: no cerumen    Audiometric Test Results: The patient was tested using air and bone audiometry.   [employer]

## 2021-01-25 RX ORDER — ALBUTEROL SULFATE 90 UG/1
2 AEROSOL, METERED RESPIRATORY (INHALATION) EVERY 6 HOURS PRN
Qty: 1 INHALER | Refills: 2 | Status: SHIPPED | OUTPATIENT
Start: 2021-01-25 | End: 2021-06-04

## 2021-01-28 ENCOUNTER — TELEPHONE (OUTPATIENT)
Dept: OTOLARYNGOLOGY | Facility: CLINIC | Age: 51
End: 2021-01-28

## 2021-01-28 ENCOUNTER — HOSPITAL ENCOUNTER (OUTPATIENT)
Dept: MRI IMAGING | Facility: HOSPITAL | Age: 51
Discharge: HOME | End: 2021-01-28
Attending: OTOLARYNGOLOGY
Payer: COMMERCIAL

## 2021-01-28 DIAGNOSIS — R42 DIZZINESS: Primary | ICD-10-CM

## 2021-01-28 DIAGNOSIS — H90.3 ASYMMETRIC SNHL (SENSORINEURAL HEARING LOSS): ICD-10-CM

## 2021-01-28 LAB — CREAT BLD-MCNC: 1 MG/DL

## 2021-01-28 PROCEDURE — 82565 ASSAY OF CREATININE: CPT

## 2021-01-28 PROCEDURE — 70553 MRI BRAIN STEM W/O & W/DYE: CPT | Performed by: OTOLARYNGOLOGY

## 2021-01-28 PROCEDURE — A9575 INJ GADOTERATE MEGLUMI 0.1ML: HCPCS | Performed by: OTOLARYNGOLOGY

## 2021-01-30 RX ORDER — MECLIZINE HYDROCHLORIDE 25 MG/1
TABLET ORAL
Qty: 30 TABLET | Refills: 2 | Status: SHIPPED | OUTPATIENT
Start: 2021-01-30 | End: 2021-03-16

## 2021-02-01 ENCOUNTER — TELEPHONE (OUTPATIENT)
Dept: OTOLARYNGOLOGY | Facility: CLINIC | Age: 51
End: 2021-02-01

## 2021-02-01 NOTE — TELEPHONE ENCOUNTER
Pt asking to see if his PT for vertigo can be done virtually or with home health - he is unable to drive to PT appts -

## 2021-02-01 NOTE — TELEPHONE ENCOUNTER
Patient stated per physical therapy since pt cannot drive due to dizziness,can do virtual therapy,Rn called physical therapy department spoke to Jessica Alcantar and will call patient to set up therapy,pt made aware and verbalized understanding.

## 2021-02-08 ENCOUNTER — MED REC SCAN ONLY (OUTPATIENT)
Dept: ADMINISTRATIVE | Age: 51
End: 2021-02-08

## 2021-02-08 ENCOUNTER — TELEPHONE (OUTPATIENT)
Dept: OTOLARYNGOLOGY | Facility: CLINIC | Age: 51
End: 2021-02-08

## 2021-02-08 NOTE — TELEPHONE ENCOUNTER
pt. requesting to get an excuse note for work, excusing the pt. from 1/25/2021 and pt. continues to be off of work due to Vertigo. Pt. States that he is a , so he is not able to drive with vertigo.  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

## 2021-02-08 NOTE — TELEPHONE ENCOUNTER
FMLA forms received in forms department, logged for processing, sent pt MyChart message for missing HIPPA.

## 2021-02-09 ENCOUNTER — APPOINTMENT (OUTPATIENT)
Dept: PHYSICAL THERAPY | Facility: HOSPITAL | Age: 51
End: 2021-02-09
Attending: OTOLARYNGOLOGY
Payer: COMMERCIAL

## 2021-02-09 ENCOUNTER — TELEPHONE (OUTPATIENT)
Dept: PHYSICAL THERAPY | Facility: HOSPITAL | Age: 51
End: 2021-02-09

## 2021-02-09 ENCOUNTER — TELEPHONE (OUTPATIENT)
Dept: OTOLARYNGOLOGY | Facility: CLINIC | Age: 51
End: 2021-02-09

## 2021-02-10 ENCOUNTER — OFFICE VISIT (OUTPATIENT)
Dept: PHYSICAL THERAPY | Facility: HOSPITAL | Age: 51
End: 2021-02-10
Attending: OTOLARYNGOLOGY
Payer: COMMERCIAL

## 2021-02-10 PROCEDURE — 97112 NEUROMUSCULAR REEDUCATION: CPT

## 2021-02-10 PROCEDURE — 97162 PT EVAL MOD COMPLEX 30 MIN: CPT

## 2021-02-10 NOTE — PROGRESS NOTES
VESTIBULAR EVALUATION:   Referring Physician: Dr. Seng Salguero  Diagnosis: dizziness     Date of Service: 2/10/2021   Insurance:  [address] [place] [address]      PATIENT SUMMARY   Yahir Bethea is a 46year old male who presents to therapy today with reports of dizzines imbalance. The results of the objective tests and measures show impairment in postural control, gaze stability.   Functional deficits include but are not limited to impaired gait, imbalance with eyes closed or visual scanning, dizziness with any rapid head Pt education was provided on exam findings, treatment diagnosis, treatment plan, expectations, and prognosis.  Pt was also provided recommendations for activity modifications, detrimental fear avoidance behaviors, importance of remaining active and possib [phone]. Sincerely,  Electronically signed by therapist: Taz Rojas, PT  [de-identified] certification required: Yes  I certify the need for these services furnished under this plan of treatment and while under my care.     X______________________________

## 2021-02-12 RX ORDER — TIOTROPIUM BROMIDE 18 UG/1
1 CAPSULE ORAL; RESPIRATORY (INHALATION) DAILY
Qty: 90 CAPSULE | Refills: 0 | Status: SHIPPED | OUTPATIENT
Start: 2021-02-12 | End: 2021-05-05

## 2021-02-13 RX ORDER — METOPROLOL TARTRATE 50 MG/1
TABLET, FILM COATED ORAL
Qty: 180 TABLET | Refills: 1 | OUTPATIENT
Start: 2021-02-13

## 2021-02-13 RX ORDER — ASPIRIN 81 MG/1
TABLET, COATED ORAL
Qty: 90 TABLET | Refills: 1 | OUTPATIENT
Start: 2021-02-13

## 2021-02-13 RX ORDER — LISINOPRIL AND HYDROCHLOROTHIAZIDE 12.5; 1 MG/1; MG/1
TABLET ORAL
Qty: 90 TABLET | Refills: 1 | OUTPATIENT
Start: 2021-02-13

## 2021-02-13 RX ORDER — ESCITALOPRAM OXALATE 10 MG/1
TABLET ORAL
Qty: 90 TABLET | Refills: 1 | OUTPATIENT
Start: 2021-02-13

## 2021-02-15 ENCOUNTER — TELEPHONE (OUTPATIENT)
Dept: OTOLARYNGOLOGY | Facility: CLINIC | Age: 51
End: 2021-02-15

## 2021-02-15 ENCOUNTER — APPOINTMENT (OUTPATIENT)
Dept: PHYSICAL THERAPY | Facility: HOSPITAL | Age: 51
End: 2021-02-15
Attending: OTOLARYNGOLOGY
Payer: COMMERCIAL

## 2021-02-15 NOTE — TELEPHONE ENCOUNTER
Completed FMLA forms emailed to [email or web address]. Spoke with pt and informed of above information, pt verbalized understanding. Uploaded a copy of completed forms to pt's [employer].

## 2021-02-15 NOTE — TELEPHONE ENCOUNTER
Per pt needs FMLA forms (3 sheets) filled out by Dr. Ash mederos. The HIPAA was sent to the wrong department which delayed the process. Pt needs them for work today and states the email to send them is on the cover sheet.  Please advise

## 2021-02-15 NOTE — TELEPHONE ENCOUNTER
Pt called stating HR is unable to open the email attachment with FMLA forms, pt requested forms to be faxed to Greg Douglas at [phone]. Completed forms faxed, confirmation received.

## 2021-02-16 ENCOUNTER — APPOINTMENT (OUTPATIENT)
Dept: PHYSICAL THERAPY | Facility: HOSPITAL | Age: 51
End: 2021-02-16
Attending: OTOLARYNGOLOGY
Payer: COMMERCIAL

## 2021-02-17 ENCOUNTER — OFFICE VISIT (OUTPATIENT)
Dept: PHYSICAL THERAPY | Facility: HOSPITAL | Age: 51
End: 2021-02-17
Attending: OTOLARYNGOLOGY
Payer: COMMERCIAL

## 2021-02-17 DIAGNOSIS — R79.89 ABNORMAL LFTS: Primary | ICD-10-CM

## 2021-02-17 PROCEDURE — 97116 GAIT TRAINING THERAPY: CPT

## 2021-02-17 PROCEDURE — 97112 NEUROMUSCULAR REEDUCATION: CPT

## 2021-02-17 RX ORDER — ESCITALOPRAM OXALATE 10 MG/1
TABLET ORAL
Qty: 90 TABLET | Refills: 1 | OUTPATIENT
Start: 2021-02-17

## 2021-02-17 RX ORDER — ALPRAZOLAM 1 MG/1
TABLET ORAL
Qty: 30 TABLET | Refills: 0 | OUTPATIENT
Start: 2021-02-17

## 2021-02-17 RX ORDER — LISINOPRIL AND HYDROCHLOROTHIAZIDE 12.5; 1 MG/1; MG/1
TABLET ORAL
Qty: 90 TABLET | Refills: 1 | OUTPATIENT
Start: 2021-02-17

## 2021-02-17 NOTE — PROGRESS NOTES
Date  2/17/21           Visit Number  2/10           NMR x           Ther EX            Ther Act            Gait Training            CRM             Manual              Dx: Vestibular Neuritis         Insurance:  Leonor Mcfarlane [facility]    Visit # authorized:  19 [id number]

## 2021-02-19 ENCOUNTER — APPOINTMENT (OUTPATIENT)
Dept: PHYSICAL THERAPY | Facility: HOSPITAL | Age: 51
End: 2021-02-19
Attending: OTOLARYNGOLOGY
Payer: COMMERCIAL

## 2021-02-19 ENCOUNTER — LAB ENCOUNTER (OUTPATIENT)
Dept: LAB | Age: 51
End: 2021-02-19
Attending: FAMILY MEDICINE
Payer: COMMERCIAL

## 2021-02-19 ENCOUNTER — TELEPHONE (OUTPATIENT)
Dept: FAMILY MEDICINE CLINIC | Facility: CLINIC | Age: 51
End: 2021-02-19

## 2021-02-19 DIAGNOSIS — R79.89 ABNORMAL LFTS: ICD-10-CM

## 2021-02-19 LAB
ALBUMIN SERPL-MCNC: 4.2 G/DL (ref 3.4–5)
ALBUMIN/GLOB SERPL: 1.2 {RATIO} (ref 1–2)
ALP LIVER SERPL-CCNC: 135 U/L
ALT SERPL-CCNC: 148 U/L
ANION GAP SERPL CALC-SCNC: 6 MMOL/L (ref 0–18)
AST SERPL-CCNC: 54 U/L (ref 15–37)
BILIRUB SERPL-MCNC: 0.6 MG/DL (ref 0.1–2)
BUN BLD-MCNC: 23 MG/DL (ref 7–18)
BUN/CREAT SERPL: 23.5 (ref 10–20)
CALCIUM BLD-MCNC: 10 MG/DL (ref 8.5–10.1)
CERULOPLASMIN SERPL-MCNC: 29.8 MG/DL (ref 20–60)
CHLORIDE SERPL-SCNC: 103 MMOL/L (ref 98–112)
CO2 SERPL-SCNC: 29 MMOL/L (ref 21–32)
CREAT BLD-MCNC: 0.98 MG/DL
GLOBULIN PLAS-MCNC: 3.5 G/DL (ref 2.8–4.4)
GLUCOSE BLD-MCNC: 121 MG/DL (ref 70–99)
HAV AB SER QL IA: NONREACTIVE
HBV CORE AB SERPL QL IA: NONREACTIVE
HBV SURFACE AB SER QL: NONREACTIVE
HBV SURFACE AB SERPL IA-ACNC: 4.66 MIU/ML
HBV SURFACE AG SERPL QL IA: NONREACTIVE
HCV AB SERPL QL IA: NONREACTIVE
IRON SATURATION: 26 %
IRON SERPL-MCNC: 107 UG/DL
M PROTEIN MFR SERPL ELPH: 7.7 G/DL (ref 6.4–8.2)
OSMOLALITY SERPL CALC.SUM OF ELEC: 291 MOSM/KG (ref 275–295)
PATIENT FASTING Y/N/NP: NO
POTASSIUM SERPL-SCNC: 4.1 MMOL/L (ref 3.5–5.1)
SODIUM SERPL-SCNC: 138 MMOL/L (ref 136–145)
TOTAL IRON BINDING CAPACITY: 411 UG/DL (ref 240–450)
TRANSFERRIN SERPL-MCNC: 276 MG/DL (ref 200–360)

## 2021-02-19 PROCEDURE — 80053 COMPREHEN METABOLIC PANEL: CPT

## 2021-02-19 PROCEDURE — 86706 HEP B SURFACE ANTIBODY: CPT

## 2021-02-19 PROCEDURE — 87340 HEPATITIS B SURFACE AG IA: CPT

## 2021-02-19 PROCEDURE — 36415 COLL VENOUS BLD VENIPUNCTURE: CPT

## 2021-02-19 PROCEDURE — 86708 HEPATITIS A ANTIBODY: CPT

## 2021-02-19 PROCEDURE — 86803 HEPATITIS C AB TEST: CPT

## 2021-02-19 PROCEDURE — 83540 ASSAY OF IRON: CPT

## 2021-02-19 PROCEDURE — 82390 ASSAY OF CERULOPLASMIN: CPT

## 2021-02-19 PROCEDURE — 86704 HEP B CORE ANTIBODY TOTAL: CPT

## 2021-02-19 PROCEDURE — 84466 ASSAY OF TRANSFERRIN: CPT

## 2021-02-19 PROCEDURE — 80500 HEPATITIS A B + C PROFILE: CPT

## 2021-02-19 NOTE — TELEPHONE ENCOUNTER
Patient calling wanting to know the number for scheduling to get his lab orders done. Patient given contact information for scheduling. Patient had no further questions.

## 2021-02-19 NOTE — PROGRESS NOTES
HPI:    Patient ID: Rossy Gordon is a 46year old male.     Telehealth outside of [employer] Verbal Consent   I conducted a telehealth visit with Rossy Gordon today, 02/19/21, which was completed using two-way, real-time interactive audio not do f/u abnormal lfts         Review of Systems  Constitutional: Negative. Negative for activity change, appetite change, diaphoresis and fatigue. Respiratory: Negative. Negative for apnea, cough, chest tightness and shortness of breath.     Cardio Take  by mouth.        Allergies:  Neosporin [Neomycin*    RASH   PHYSICAL EXAM:   Physical Exam  Pt is breathing comfortable over the phone and speaking in full sentences without shortness of breath           ASSESSMENT/PLAN:     (F41.9) Anxiety  (primary

## 2021-02-22 ENCOUNTER — APPOINTMENT (OUTPATIENT)
Dept: PHYSICAL THERAPY | Facility: HOSPITAL | Age: 51
End: 2021-02-22
Attending: OTOLARYNGOLOGY
Payer: COMMERCIAL

## 2021-02-23 ENCOUNTER — APPOINTMENT (OUTPATIENT)
Dept: PHYSICAL THERAPY | Facility: HOSPITAL | Age: 51
End: 2021-02-23
Attending: OTOLARYNGOLOGY
Payer: COMMERCIAL

## 2021-02-24 ENCOUNTER — APPOINTMENT (OUTPATIENT)
Dept: PHYSICAL THERAPY | Facility: HOSPITAL | Age: 51
End: 2021-02-24
Attending: OTOLARYNGOLOGY
Payer: COMMERCIAL

## 2021-02-24 PROCEDURE — 97116 GAIT TRAINING THERAPY: CPT

## 2021-02-24 PROCEDURE — 97112 NEUROMUSCULAR REEDUCATION: CPT

## 2021-02-24 RX ORDER — ALPRAZOLAM 1 MG/1
TABLET ORAL
Qty: 30 TABLET | Refills: 0 | Status: SHIPPED | OUTPATIENT
Start: 2021-02-24 | End: 2021-05-19

## 2021-02-24 RX ORDER — ASPIRIN 81 MG/1
TABLET, COATED ORAL
Qty: 90 TABLET | Refills: 1 | Status: SHIPPED | OUTPATIENT
Start: 2021-02-24 | End: 2021-08-11

## 2021-02-24 NOTE — PROGRESS NOTES
Date  2/17/21 2/24/21          Visit Number  2/10 3/10          NMR x x          Ther EX            Ther Act            Gait Training            CRM             Manual              Dx: Vestibular Neuritis         Insurance:  [facility]    Visit # Jessicaa Redfield

## 2021-02-26 ENCOUNTER — APPOINTMENT (OUTPATIENT)
Dept: PHYSICAL THERAPY | Facility: HOSPITAL | Age: 51
End: 2021-02-26
Attending: OTOLARYNGOLOGY
Payer: COMMERCIAL

## 2021-03-01 ENCOUNTER — APPOINTMENT (OUTPATIENT)
Dept: PHYSICAL THERAPY | Facility: HOSPITAL | Age: 51
End: 2021-03-01
Attending: OTOLARYNGOLOGY
Payer: COMMERCIAL

## 2021-03-02 ENCOUNTER — APPOINTMENT (OUTPATIENT)
Dept: PHYSICAL THERAPY | Facility: HOSPITAL | Age: 51
End: 2021-03-02
Attending: OTOLARYNGOLOGY
Payer: COMMERCIAL

## 2021-03-03 ENCOUNTER — APPOINTMENT (OUTPATIENT)
Dept: PHYSICAL THERAPY | Facility: HOSPITAL | Age: 51
End: 2021-03-03
Attending: OTOLARYNGOLOGY
Payer: COMMERCIAL

## 2021-03-03 PROCEDURE — 97112 NEUROMUSCULAR REEDUCATION: CPT

## 2021-03-03 NOTE — PROGRESS NOTES
Date  2/17/21 2/24/21 3/3/21         Visit Number 2/10 3/10 4/10         NMR x x x         Ther EX            Ther Act            Gait Training            CRM             Manual              Dx: Vestibular Neuritis         Insurance:  [address] [place] [address]    Visit

## 2021-03-05 ENCOUNTER — APPOINTMENT (OUTPATIENT)
Dept: PHYSICAL THERAPY | Facility: HOSPITAL | Age: 51
End: 2021-03-05
Attending: OTOLARYNGOLOGY
Payer: COMMERCIAL

## 2021-03-08 ENCOUNTER — APPOINTMENT (OUTPATIENT)
Dept: PHYSICAL THERAPY | Facility: HOSPITAL | Age: 51
End: 2021-03-08
Attending: OTOLARYNGOLOGY
Payer: COMMERCIAL

## 2021-03-09 ENCOUNTER — APPOINTMENT (OUTPATIENT)
Dept: PHYSICAL THERAPY | Facility: HOSPITAL | Age: 51
End: 2021-03-09
Attending: OTOLARYNGOLOGY
Payer: COMMERCIAL

## 2021-03-10 ENCOUNTER — OFFICE VISIT (OUTPATIENT)
Dept: PHYSICAL THERAPY | Facility: HOSPITAL | Age: 51
End: 2021-03-10
Attending: OTOLARYNGOLOGY
Payer: COMMERCIAL

## 2021-03-10 PROCEDURE — 97112 NEUROMUSCULAR REEDUCATION: CPT

## 2021-03-10 NOTE — PROGRESS NOTES
Date  2/17/21 2/24/21 3/3/21 3/10/21        Visit Number 2/10 3/10 4/10 5/10        NMR x x x x        Ther EX            Ther Act            Gait Training            CRM             Manual              Dx: Vestibular Neuritis         Insurance:  Meryl Wheeler

## 2021-03-12 ENCOUNTER — APPOINTMENT (OUTPATIENT)
Dept: PHYSICAL THERAPY | Facility: HOSPITAL | Age: 51
End: 2021-03-12
Attending: OTOLARYNGOLOGY
Payer: COMMERCIAL

## 2021-03-16 ENCOUNTER — OFFICE VISIT (OUTPATIENT)
Dept: FAMILY MEDICINE CLINIC | Facility: CLINIC | Age: 51
End: 2021-03-16

## 2021-03-16 VITALS
SYSTOLIC BLOOD PRESSURE: 182 MMHG | HEART RATE: 71 BPM | DIASTOLIC BLOOD PRESSURE: 88 MMHG | WEIGHT: 265 LBS | HEIGHT: 67 IN | BODY MASS INDEX: 41.59 KG/M2

## 2021-03-16 DIAGNOSIS — R42 VERTIGO: Primary | ICD-10-CM

## 2021-03-16 PROCEDURE — 99213 OFFICE O/P EST LOW 20 MIN: CPT | Performed by: FAMILY MEDICINE

## 2021-03-16 PROCEDURE — 97810 ACUP 1/> WO ESTIM 1ST 15 MIN: CPT | Performed by: FAMILY MEDICINE

## 2021-03-16 PROCEDURE — 3077F SYST BP >= 140 MM HG: CPT | Performed by: FAMILY MEDICINE

## 2021-03-16 PROCEDURE — 3079F DIAST BP 80-89 MM HG: CPT | Performed by: FAMILY MEDICINE

## 2021-03-16 PROCEDURE — 3008F BODY MASS INDEX DOCD: CPT | Performed by: FAMILY MEDICINE

## 2021-03-16 NOTE — PROGRESS NOTES
HPI/Subjective:   Patient ID: Rossy Gordon is a 46year old male. Patient here for acu treatment for hearing loss right ear associated with vertigo. Patient is being seen by ENT and had MRI brain done no acute findings seen.    His hearing is minima • omega-3 fatty acids 1000 MG Oral Cap Take 3,000 mg by mouth daily. • Multiple Vitamin (MULTI-VITAMINS) Oral Tab Take  by mouth.        Allergies:  Neosporin [Neomycin*    RASH    Objective:   Physical Exam  Constitutional:       Appearance: He is

## 2021-03-16 NOTE — PROCEDURES
Patient tolerated procedure well in excess of 30 minutes was spent with patient   There was no complications all needles were removed.    Patient was advised to rest today and f/u in 1-2 weeks  Gb2,liv3,gb43, th17, st36, sp6, sp9

## 2021-03-17 ENCOUNTER — OFFICE VISIT (OUTPATIENT)
Dept: PHYSICAL THERAPY | Facility: HOSPITAL | Age: 51
End: 2021-03-17
Attending: OTOLARYNGOLOGY
Payer: COMMERCIAL

## 2021-03-17 DIAGNOSIS — Z23 NEED FOR VACCINATION: ICD-10-CM

## 2021-03-17 PROCEDURE — 97112 NEUROMUSCULAR REEDUCATION: CPT

## 2021-03-17 NOTE — PROGRESS NOTES
Date  2/17/21 2/24/21 3/3/21 3/10/21 3/17/21       Visit Number 2/10 3/10 4/10 5/10 6/10       NMR x x x x x       Ther EX            Ther Act            Gait Training            CRM             Manual              Dx: Vestibular Neuritis         Insurance

## 2021-03-23 ENCOUNTER — TELEPHONE (OUTPATIENT)
Dept: PHYSICAL THERAPY | Facility: HOSPITAL | Age: 51
End: 2021-03-23

## 2021-03-24 ENCOUNTER — APPOINTMENT (OUTPATIENT)
Dept: PHYSICAL THERAPY | Facility: HOSPITAL | Age: 51
End: 2021-03-24
Attending: OTOLARYNGOLOGY
Payer: COMMERCIAL

## 2021-03-26 ENCOUNTER — TELEPHONE (OUTPATIENT)
Dept: PHYSICAL THERAPY | Facility: HOSPITAL | Age: 51
End: 2021-03-26

## 2021-03-26 ENCOUNTER — TELEPHONE (OUTPATIENT)
Dept: CASE MANAGEMENT | Age: 51
End: 2021-03-26

## 2021-03-26 DIAGNOSIS — R42 DIZZINESS: Primary | ICD-10-CM

## 2021-03-26 NOTE — TELEPHONE ENCOUNTER
Pt requesting a referral to see Dr. Laurent Esposito. Pt notes that he is experiencing vertigo. Pt is not seeing much improvement form PT. Please sign referral so pt can move forward with care.

## 2021-03-29 ENCOUNTER — TELEPHONE (OUTPATIENT)
Dept: OTOLARYNGOLOGY | Facility: CLINIC | Age: 51
End: 2021-03-29

## 2021-03-29 ENCOUNTER — TELEPHONE (OUTPATIENT)
Dept: FAMILY MEDICINE CLINIC | Facility: CLINIC | Age: 51
End: 2021-03-29

## 2021-03-29 ENCOUNTER — MED REC SCAN ONLY (OUTPATIENT)
Dept: FAMILY MEDICINE CLINIC | Facility: CLINIC | Age: 51
End: 2021-03-29

## 2021-03-29 NOTE — TELEPHONE ENCOUNTER
Dr Abril Desouza received [employer] [facility] [employer] fax requesting copy of sleepy study most recent sleep study was 5/23/2014. Faxed to [phone].

## 2021-03-29 NOTE — TELEPHONE ENCOUNTER
Pt has completed 8 PT visits and they are not helping - he does not want to finish PT - asking for other suggestions from

## 2021-03-30 ENCOUNTER — TELEPHONE (OUTPATIENT)
Dept: PULMONOLOGY | Facility: CLINIC | Age: 51
End: 2021-03-30

## 2021-03-30 DIAGNOSIS — K76.0 HEPATIC STEATOSIS: ICD-10-CM

## 2021-03-30 DIAGNOSIS — G47.33 OSA (OBSTRUCTIVE SLEEP APNEA): Primary | ICD-10-CM

## 2021-03-31 ENCOUNTER — APPOINTMENT (OUTPATIENT)
Dept: PHYSICAL THERAPY | Facility: HOSPITAL | Age: 51
End: 2021-03-31
Attending: OTOLARYNGOLOGY
Payer: COMMERCIAL

## 2021-03-31 ENCOUNTER — HOSPITAL ENCOUNTER (OUTPATIENT)
Dept: ULTRASOUND IMAGING | Age: 51
Discharge: HOME | End: 2021-03-31
Attending: FAMILY MEDICINE
Payer: COMMERCIAL

## 2021-03-31 DIAGNOSIS — R79.89 ABNORMAL LFTS: ICD-10-CM

## 2021-03-31 PROCEDURE — 76705 ECHO EXAM OF ABDOMEN: CPT | Performed by: FAMILY MEDICINE

## 2021-04-05 ENCOUNTER — PATIENT MESSAGE (OUTPATIENT)
Dept: OTOLARYNGOLOGY | Facility: CLINIC | Age: 51
End: 2021-04-05

## 2021-04-05 ENCOUNTER — TELEPHONE (OUTPATIENT)
Dept: FAMILY MEDICINE CLINIC | Facility: CLINIC | Age: 51
End: 2021-04-05

## 2021-04-05 NOTE — TELEPHONE ENCOUNTER
Patient is asking for a new referral for his vertigo he was not comfortable with Dr. José Sheehan and he filled he was rushed at the appointment.

## 2021-04-05 NOTE — TELEPHONE ENCOUNTER
From: Adelaida Piedra  To: Cory Lyles MD  Sent: 4/5/2021 10:04 AM CDT  Subject: Visit Follow-up Question    Is there something else I can do for this virtigo? Went to 7 therapy seccions and went for acupuncture still no help!  Haven t worked since Jan.

## 2021-04-06 DIAGNOSIS — R42 VERTIGO: Primary | ICD-10-CM

## 2021-04-06 RX ORDER — ATORVASTATIN CALCIUM 80 MG/1
TABLET, FILM COATED ORAL
Qty: 90 TABLET | Refills: 1 | Status: SHIPPED | OUTPATIENT
Start: 2021-04-06 | End: 2021-09-22

## 2021-04-06 RX ORDER — METOPROLOL TARTRATE 50 MG/1
TABLET, FILM COATED ORAL
Qty: 180 TABLET | Refills: 1 | Status: SHIPPED | OUTPATIENT
Start: 2021-04-06 | End: 2021-09-22

## 2021-04-07 NOTE — TELEPHONE ENCOUNTER
Spoke to patient and provided him with 's phone number to make an appointment. He said he didn't need a new ENT doctor as Boyd Shannon told him theres nothing she can do for him.

## 2021-04-07 NOTE — TELEPHONE ENCOUNTER
I looked through the notes and it appears that dr Amy Mojica was recommending that he sees neurologist.   I did referral for neurologist and for another ent. I agree that he should see neurologist first   Please call him and let him know.

## 2021-04-08 ENCOUNTER — OFFICE VISIT (OUTPATIENT)
Dept: NEUROLOGY | Facility: CLINIC | Age: 51
End: 2021-04-08

## 2021-04-08 VITALS
WEIGHT: 260 LBS | DIASTOLIC BLOOD PRESSURE: 76 MMHG | BODY MASS INDEX: 40.81 KG/M2 | HEIGHT: 67 IN | SYSTOLIC BLOOD PRESSURE: 148 MMHG | HEART RATE: 76 BPM

## 2021-04-08 DIAGNOSIS — R42 VERTIGO: Primary | ICD-10-CM

## 2021-04-08 DIAGNOSIS — H91.91 HEARING LOSS OF RIGHT EAR, UNSPECIFIED HEARING LOSS TYPE: ICD-10-CM

## 2021-04-08 PROCEDURE — 3008F BODY MASS INDEX DOCD: CPT | Performed by: OTHER

## 2021-04-08 PROCEDURE — 99203 OFFICE O/P NEW LOW 30 MIN: CPT | Performed by: OTHER

## 2021-04-08 PROCEDURE — 3077F SYST BP >= 140 MM HG: CPT | Performed by: OTHER

## 2021-04-08 PROCEDURE — 3078F DIAST BP <80 MM HG: CPT | Performed by: OTHER

## 2021-04-08 NOTE — PROGRESS NOTES
Neurology Initial Visit     Referred By: Dr. Merary Coates    Chief Complaint: Patient presents with:  Dizziness: Patient presents today c/o dizziness that started in January.   Patient states that initially he lost hearing in right ear and then a few days later Performed by Paola Hewitt MD at [facility] [place]   • [address]   • UMBILICAL HERNIA REPAIR         Social history:    Smoking status: Current Every Day Smoker 0.50 Packs/day For 30.00 Years   Types: Cigarettes   Smokeless tobacco: Current User       Alc As in HPI, the rest of the 14 system review was done and was negative      Physical Exam:   04/08/21  1341 04/08/21  1345   BP: 144/72 148/76   Pulse: 76    Weight: 260 lb (117.9 kg)    Height: 67\"        General: No apparent distress, well nourished, w 03/15/2016    ALB 4.2 02/19/2021     02/19/2021    K 4.1 02/19/2021     02/19/2021    CO2 29.0 02/19/2021      I have reviewed labs. Imaging Studies:  I have independently reviewed imaging.   RI of IAC was essentially normal        Assessment

## 2021-04-09 ENCOUNTER — TELEPHONE (OUTPATIENT)
Dept: NEUROLOGY | Facility: CLINIC | Age: 51
End: 2021-04-09

## 2021-04-09 ENCOUNTER — TELEPHONE (OUTPATIENT)
Dept: FAMILY MEDICINE CLINIC | Facility: CLINIC | Age: 51
End: 2021-04-09

## 2021-04-09 DIAGNOSIS — R42 VERTIGO: Primary | ICD-10-CM

## 2021-04-10 NOTE — TELEPHONE ENCOUNTER
Patient states his wife will come into the office this morning to have someone print the letter out.

## 2021-04-12 ENCOUNTER — TELEPHONE (OUTPATIENT)
Dept: NEUROLOGY | Facility: CLINIC | Age: 51
End: 2021-04-12

## 2021-04-12 ENCOUNTER — TELEPHONE (OUTPATIENT)
Dept: FAMILY MEDICINE CLINIC | Facility: CLINIC | Age: 51
End: 2021-04-12

## 2021-04-12 DIAGNOSIS — R42 VERTIGO: Primary | ICD-10-CM

## 2021-04-12 NOTE — TELEPHONE ENCOUNTER
Yovana Zamora /referral coordinator in neurology is requesting that Dr. Leo Galloway initiate a referral for Dr. Ana Smith in Otolaryngology.    Per Yovana Zamora, the patient has [employer] and the speciality dept cannot initiate a referral request.

## 2021-04-12 NOTE — TELEPHONE ENCOUNTER
Per [facility]ia PCP- Dr. Paul Fairchild will have to initiate authorization request for referral to [address]. T/t Leyla JEFF@ Dr. Erendira Ferrari office Informed of above.  She will forward request to referrals so they can initiate

## 2021-04-14 ENCOUNTER — TELEPHONE (OUTPATIENT)
Dept: FAMILY MEDICINE CLINIC | Facility: CLINIC | Age: 51
End: 2021-04-14

## 2021-04-15 ENCOUNTER — TELEPHONE (OUTPATIENT)
Dept: FAMILY MEDICINE CLINIC | Facility: CLINIC | Age: 51
End: 2021-04-15

## 2021-04-15 NOTE — TELEPHONE ENCOUNTER
Pt states that the letter that he was given for his Union on 4/10 was not enough information for his employer. Now states that he needs dates of PT since March as well as names and dates of office visits for the neurologist.  Please review pended letter.

## 2021-04-15 NOTE — TELEPHONE ENCOUNTER
See mychart encounter 4/15, patient submitted FMLA forms.         Betito Larkin MD  Em Rn Triage 4 hours ago (1:27 PM)     Patient actually needs to do another fmla

## 2021-04-19 ENCOUNTER — TELEPHONE (OUTPATIENT)
Dept: NEUROLOGY | Facility: CLINIC | Age: 51
End: 2021-04-19

## 2021-04-23 ENCOUNTER — TELEPHONE (OUTPATIENT)
Dept: CASE MANAGEMENT | Age: 51
End: 2021-04-23

## 2021-04-23 DIAGNOSIS — R42 VERTIGO: Primary | ICD-10-CM

## 2021-04-23 NOTE — TELEPHONE ENCOUNTER
Dr. Ng Carrier Please see message below for detail about order. Please place order if, you agree.   thanks

## 2021-04-23 NOTE — TELEPHONE ENCOUNTER
I called Dr. Gael Dodd office at [phone] and I spoke to Freddy from his office. Freddy took my message that we need clarification on what pt needs to have done before his appt with Dr. Gael Dodd on May 10,2021.  Freddy stated that it will take about 48 hours for

## 2021-04-23 NOTE — TELEPHONE ENCOUNTER
Pt is requesting a VNG test. Pt states that Dr. Azael Chavez wants test done prior to appt.  Please place order or referral if you agree with plan of care

## 2021-04-23 NOTE — TELEPHONE ENCOUNTER
Cristian Hope MD  You; Em Rn Triage 19 minutes ago (2:34 PM)     Can you please pend that test . I am not sure who does it and where.    It is some vestibuar test. Not sure if dr Choco Rasmussen office needs to be called    Message text

## 2021-04-26 ENCOUNTER — TELEPHONE (OUTPATIENT)
Dept: FAMILY MEDICINE CLINIC | Facility: CLINIC | Age: 51
End: 2021-04-26

## 2021-04-26 DIAGNOSIS — R42 VERTIGO: Primary | ICD-10-CM

## 2021-04-26 NOTE — TELEPHONE ENCOUNTER
Note    ----- Message -----  From: Angelita Crowe Sent: 4/20/2021   6:23 AM CDT  To: Megan Campos [facility] - Main  Subject: CANCELED REFERRAL                                 Due to no response and timely processing, this request is canceled.

## 2021-04-26 NOTE — TELEPHONE ENCOUNTER
Spoke with Yaquelin Davison from Dr. Dane Desir office ( [place] [address]),  verified. She stated she spoke with pt and pt told her that he called his insurance and he was told that he will need an order and referral for Vestibular test (VNG) from PCP.     Nguyễn Landers

## 2021-04-26 NOTE — TELEPHONE ENCOUNTER
Confirm with Managed Care dept, spoke with Yanick and stated pt will need to see ENT through [facility] list) prior referring to outside facility. ENT referral pended. pls advise, thanks in advance.           Future Appointments   Date Time Kevin

## 2021-04-26 NOTE — TELEPHONE ENCOUNTER
Elizabeth from Dr. Oliva Files office returned call. States pt is having the VNG test at Dr. Oliva Files office so no orders needed. Keke People will contact pt to inform him Dr. Yoana Moon does not need to place order.

## 2021-04-27 NOTE — TELEPHONE ENCOUNTER
Type Date User Summary Attachment   General 04/19/2021  9:58 AM Adan Liriano. Auto: Referral message -   Note    ----- Message -----  From: Frances Garcia   Sent: 4/19/2021   9:58 AM CDT  To: Megan Horner [facility] Care - Main  Subject: RESPONSE

## 2021-04-27 NOTE — TELEPHONE ENCOUNTER
Called pt to inform that dr Merlinda Mathieu is not in his network. Advised pt to call his insurance company to find out which specialist are in his network. Pt verbalized understanding.  Copy of ENT referral mailed to pt's home per request so he can present it to i

## 2021-04-27 NOTE — TELEPHONE ENCOUNTER
Dr. Leena Renteria form pending. Pt has exhausted 12 weeks of FMLA since 1/25/21. He stated that his work will give him another 12 weeks of continuous leave due to the Vertigo. Do you approve the extension of the 12 weeks? Please advise.     Thank you,  Mi
Dr. Severo Fruit,    FMLA extension - please sign off if you approve pt to extend his FMLA for another 12 weeks due to Vertigo issues. Please sign off on form:  -Highlight the patient and hit \"Chart\" button.   -In Chart Review, w/in the Encounter tab - clic
Faxed completed FMLA to Pt's TONYA Peguero Number - [phone]. Fax failed 2x. Refaxed to [phone] Mailed copy to pt.  Pt Aware
Fmla received in forms dept. Logged for processing.
Letter faxed to [facility] at [phone] with claim number [id number]. Sent [email or web address].
Patient called stating [employer] needed office notes to back up letter sent.  OV from 3/3/21 to present faxed to [facility] w/claim #[id number]
Patient called to check on progress of note below. Patient asking for an additional 12 weeks off of work to be approved by [employer]. Please see notes below.
Pt states [facility] is asking for most recent letter dr. Yaniv Fuchs typed. Sent [employer] message for [facility] [employer] and informed pt I could complete request once I get his response.
Pt will be submitting new forms, pt states his previous FMLA exceeded. [employer] on file.  Refer to 2/8/21 for reference
Spoke to patient and he needs the forms to go to his employer not to [employer].
no calf pain/no limited range of motion/no neck pain

## 2021-04-27 NOTE — TELEPHONE ENCOUNTER
Caroline Bee MD  Em Rn Triage 1 hour ago (2:34 PM)     Please call the patient and lethvick know.  About this   He may look into ent drs at these other institutions and let us know      Spoke to patient and he would like a referral to an ENT from [facility].

## 2021-04-29 ENCOUNTER — OFFICE VISIT (OUTPATIENT)
Dept: OTOLARYNGOLOGY | Facility: CLINIC | Age: 51
End: 2021-04-29

## 2021-04-29 VITALS
SYSTOLIC BLOOD PRESSURE: 150 MMHG | WEIGHT: 260 LBS | DIASTOLIC BLOOD PRESSURE: 81 MMHG | HEIGHT: 67 IN | BODY MASS INDEX: 40.81 KG/M2 | TEMPERATURE: 99 F

## 2021-04-29 DIAGNOSIS — R42 DIZZINESS: Primary | ICD-10-CM

## 2021-04-29 PROCEDURE — 3008F BODY MASS INDEX DOCD: CPT | Performed by: OTOLARYNGOLOGY

## 2021-04-29 PROCEDURE — 3077F SYST BP >= 140 MM HG: CPT | Performed by: OTOLARYNGOLOGY

## 2021-04-29 PROCEDURE — 99214 OFFICE O/P EST MOD 30 MIN: CPT | Performed by: OTOLARYNGOLOGY

## 2021-04-29 PROCEDURE — 3079F DIAST BP 80-89 MM HG: CPT | Performed by: OTOLARYNGOLOGY

## 2021-04-29 NOTE — TELEPHONE ENCOUNTER
Hi Dr. Simeon Ross is no longer with our team and I am working the [employer] site. I was reading the documentation below and I am trying to understand what Kem Núñez is asking for.     I read OV notes from Dr. Wellington Bazan and he wanted him to see

## 2021-04-29 NOTE — PROGRESS NOTES
Adelaida Piedra is a 46year old male. 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   Known hearing loss on the left cholesterol    • Hyperlipidemia    • Hypertension    • Hypertriglyceridemia    • Lipid screening 6/23/2014   • MADELIN on CPAP    • Sleep apnea    • Tobacco dependence    • Umbilical hernia 8240       Past Surgical History:   Procedure Laterality Date   • BYPA TM - Right: Normal, Left: Normal.   Skin Normal Inspection - Normal.        Lymph Detail Normal Submental. Submandibular. Anterior cervical. Posterior cervical. Supraclavicular.         Nose/Mouth/Throat Normal External nose - Normal. Lips/teeth/gums - Norm by neurology and referred to an outside ENT. I recommend that he undergo a balance study repeat audiogram as he does note some improvement in his hearing at this point. Return to see me after the studies complex history 30 minutes spent with patient.   -

## 2021-05-05 RX ORDER — TIOTROPIUM BROMIDE 18 UG/1
1 CAPSULE ORAL; RESPIRATORY (INHALATION) DAILY
Qty: 90 CAPSULE | Refills: 0 | Status: SHIPPED | OUTPATIENT
Start: 2021-05-05 | End: 2021-07-28

## 2021-05-06 ENCOUNTER — TELEPHONE (OUTPATIENT)
Dept: FAMILY MEDICINE CLINIC | Facility: CLINIC | Age: 51
End: 2021-05-06

## 2021-05-10 RX ORDER — FLUTICASONE PROPIONATE 220 UG/1
AEROSOL, METERED RESPIRATORY (INHALATION)
Qty: 36 INHALER | Refills: 1 | Status: SHIPPED | OUTPATIENT
Start: 2021-05-10 | End: 2021-10-06

## 2021-05-10 RX ORDER — MOMETASONE FUROATE 200 UG/1
1 AEROSOL RESPIRATORY (INHALATION) 2 TIMES DAILY
Qty: 3 INHALER | Refills: 1 | Status: SHIPPED | OUTPATIENT
Start: 2021-05-10 | End: 2022-03-15

## 2021-05-10 NOTE — TELEPHONE ENCOUNTER
Spoke with [facility] pharmacy RE: Asmanex Rx--on hold 6 minutes--f/u tomorrow for coverage

## 2021-05-10 NOTE — TELEPHONE ENCOUNTER
Dr Paramjit Hassan, flovent not covered by patient's plan, would you like to change rx or proceed with PA!

## 2021-05-11 NOTE — TELEPHONE ENCOUNTER
Spoke with [facility] pharmacy and both medications Asmanex and Flovent require a $70 co-pay per patient insurance. Spoke with patient informed of above, patient states he will  the Asmanex today. No further questions at this time.

## 2021-05-12 ENCOUNTER — OFFICE VISIT (OUTPATIENT)
Dept: AUDIOLOGY | Facility: CLINIC | Age: 51
End: 2021-05-12

## 2021-05-12 DIAGNOSIS — R42 DIZZINESS: Primary | ICD-10-CM

## 2021-05-12 PROCEDURE — 92537 CALORIC VSTBLR TEST W/REC: CPT | Performed by: AUDIOLOGIST

## 2021-05-12 PROCEDURE — 92567 TYMPANOMETRY: CPT | Performed by: AUDIOLOGIST

## 2021-05-12 PROCEDURE — 92540 BASIC VESTIBULAR EVALUATION: CPT | Performed by: AUDIOLOGIST

## 2021-05-12 PROCEDURE — 92557 COMPREHENSIVE HEARING TEST: CPT | Performed by: AUDIOLOGIST

## 2021-05-14 ENCOUNTER — TELEPHONE (OUTPATIENT)
Dept: OTOLARYNGOLOGY | Facility: CLINIC | Age: 51
End: 2021-05-14

## 2021-05-14 NOTE — PROGRESS NOTES
AUDIOLOGY REPORT      David Friedman is a 46year old male     Referring Provider: Rosalino Bee   YOB: 1970  Medical Record: [id number]      Patient Hearing History:  Patient reported a sudden onset hearing loss in January 2021, followed pure-tone thresholds. A word recognition score of   84 % was obtained for the right ear. A word recognition score of   96 % was obtained for the left ear.    These results also show improvement in the right ear score, which was recorded at 60% on 1-23- VNG test results show no abnormalities. Ocular-motor tests of central vestibular function were normal.   No spontaneous or positional nystagmus. Bithermal caloric irrigations yielded robust and equal responses from both ears.          Recommendation

## 2021-05-15 NOTE — PROGRESS NOTES
Improved hearing on recent audiogram. Repeat audiogram in 6 months. Balance study showed no inner ear abnormalities. He mat RTC to discuss further management options.

## 2021-05-17 NOTE — TELEPHONE ENCOUNTER
Dr Natalio Mendoza informed patient of note below verbalized understanding asking  if ok to go back to work pt is a  still have dizziness,please advise,pt will call back to schedule.

## 2021-05-17 NOTE — TELEPHONE ENCOUNTER
Per Dr Cady Hercules Improved hearing on recent audiogram. Repeat audiogram in 6 months. Balance study showed no inner ear abnormalities.  He mat RTC to discuss further management options

## 2021-05-18 ENCOUNTER — PATIENT MESSAGE (OUTPATIENT)
Dept: GASTROENTEROLOGY | Facility: CLINIC | Age: 51
End: 2021-05-18

## 2021-05-18 NOTE — TELEPHONE ENCOUNTER
Patient asking to discuss ultrasound results. Did patient get results from Dr. Varun Ardon? LMTCB. Please transfer to triage. [employer] message sent to patient.     Per result note from Dr. Jovan Ingram,   Your ultrasound liver is showing severe fatty

## 2021-05-18 NOTE — TELEPHONE ENCOUNTER
From: Shayla Altamirano  To: Lalita Hernandez. Karissa Kumar MD  Sent: 5/18/2021 8:20 AM CDT  Subject: Test Results Question    What does this mean?

## 2021-05-18 NOTE — TELEPHONE ENCOUNTER
Patient asking for sooner appointment to discuss ultrasound results. Has appointment with Dr. Thomas Powell on 07/12/2021. No sooner appointments available and patient does not want to schedule with Alana Pérez.  Patient on a wait list.  Patient advised

## 2021-05-19 RX ORDER — ALPRAZOLAM 1 MG/1
TABLET ORAL
Qty: 30 TABLET | Refills: 0 | Status: SHIPPED | OUTPATIENT
Start: 2021-05-19 | End: 2021-09-13

## 2021-05-19 RX ORDER — ESCITALOPRAM OXALATE 10 MG/1
TABLET ORAL
Qty: 90 TABLET | Refills: 0 | Status: SHIPPED | OUTPATIENT
Start: 2021-05-19 | End: 2021-09-08

## 2021-05-19 NOTE — TELEPHONE ENCOUNTER
Study did not show an inner ear cause of his imbalance. Imbalance may be due to other non ear issues such as stress, anxiety, cardiac issues. 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  RTC to discuss further manageme

## 2021-05-23 ENCOUNTER — PATIENT MESSAGE (OUTPATIENT)
Dept: OTOLARYNGOLOGY | Facility: CLINIC | Age: 51
End: 2021-05-23

## 2021-05-24 ENCOUNTER — TELEPHONE (OUTPATIENT)
Dept: FAMILY MEDICINE CLINIC | Facility: CLINIC | Age: 51
End: 2021-05-24

## 2021-05-24 DIAGNOSIS — R42 VERTIGO: Primary | ICD-10-CM

## 2021-05-24 NOTE — TELEPHONE ENCOUNTER
From: Marcel Tang  To: Clemencia Aaron.  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

## 2021-05-24 NOTE — TELEPHONE ENCOUNTER
Spoke with patient--has f/u appt with Dr. Cherylene Scotts 5/27/2021, but needs referral, otherwise he needs to cancel appt    Referral pended as requested    Please reply to pool: EM TRIAGE support

## 2021-05-25 ENCOUNTER — TELEPHONE (OUTPATIENT)
Dept: OTOLARYNGOLOGY | Facility: CLINIC | Age: 51
End: 2021-05-25

## 2021-05-25 NOTE — TELEPHONE ENCOUNTER
Dr Katelynn Magaña made aware of note below and informed patient that work letter is at the front ready for patient.

## 2021-05-25 NOTE — TELEPHONE ENCOUNTER
Spoke with patient and relayed Dr. Iker Guzman message below--patient verbalizes understanding and agreement. No further questions/concerns at this time.       Referral    Spring Arteaga MD  Em  Rubio Lpn/Cma 11 hours ago (9:14 PM)     Referral signed    Isis Boyce

## 2021-05-27 ENCOUNTER — OFFICE VISIT (OUTPATIENT)
Dept: OTOLARYNGOLOGY | Facility: CLINIC | Age: 51
End: 2021-05-27

## 2021-05-27 VITALS
BODY MASS INDEX: 39.24 KG/M2 | SYSTOLIC BLOOD PRESSURE: 145 MMHG | WEIGHT: 250 LBS | DIASTOLIC BLOOD PRESSURE: 72 MMHG | TEMPERATURE: 98 F | HEIGHT: 67 IN

## 2021-05-27 DIAGNOSIS — R42 VERTIGO: Primary | ICD-10-CM

## 2021-05-27 PROCEDURE — 3077F SYST BP >= 140 MM HG: CPT | Performed by: OTOLARYNGOLOGY

## 2021-05-27 PROCEDURE — 99213 OFFICE O/P EST LOW 20 MIN: CPT | Performed by: OTOLARYNGOLOGY

## 2021-05-27 PROCEDURE — 3078F DIAST BP <80 MM HG: CPT | Performed by: OTOLARYNGOLOGY

## 2021-05-27 PROCEDURE — 3008F BODY MASS INDEX DOCD: CPT | Performed by: OTOLARYNGOLOGY

## 2021-05-28 NOTE — PROGRESS NOTES
Jeremizoyalakeisha  is a 46year old male. Patient presents with: Follow - Up: pt presents today a f/u on vertigo. pt states still has vertigo and no changes.       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   Psychiatric Normal Orientation - Oriented to time, place, person & situation. Appropriate mood and affect.    Neck Exam Normal Inspection - Normal Rfl: 0  •  ATORVASTATIN 80 MG Oral Tab, TAKE 1 TABLET BY MOUTH EVERY DAY, Disp: 90 tablet, Rfl: 1  •  METOPROLOL TARTRATE 50 MG Oral Tab, TAKE 1 TABLET BY MOUTH TWICE A DAY, Disp: 180 tablet, Rfl: 1  •  ASPIRIN LOW DOSE 81 MG Oral Tab EC, TAKE 1 TABLET BY

## 2021-06-04 ENCOUNTER — TELEPHONE (OUTPATIENT)
Dept: FAMILY MEDICINE CLINIC | Facility: CLINIC | Age: 51
End: 2021-06-04

## 2021-06-04 DIAGNOSIS — R42 VERTIGO: Primary | ICD-10-CM

## 2021-06-04 RX ORDER — ALBUTEROL SULFATE 90 UG/1
2 AEROSOL, METERED RESPIRATORY (INHALATION) EVERY 6 HOURS PRN
Qty: 1 EACH | Refills: 2 | Status: SHIPPED | OUTPATIENT
Start: 2021-06-04 | End: 2021-07-14

## 2021-06-04 NOTE — TELEPHONE ENCOUNTER
Spoke with patient ( verified)--report he saw Dr. Eden Martinez was negative. Patient asking Dr. Grace Stewart for referral for chiropractor or naturopath or other recommendations    \"I don't know what else to do for my vertigo.  I tried B12, acupuncture and

## 2021-06-05 NOTE — TELEPHONE ENCOUNTER
I placed referral for chiropractor for the patient. Please dorita him.    We usually send patient to Dr. Teresa Porter

## 2021-06-15 ENCOUNTER — OFFICE VISIT (OUTPATIENT)
Dept: FAMILY MEDICINE CLINIC | Facility: CLINIC | Age: 51
End: 2021-06-15
Payer: MEDICAID

## 2021-06-15 VITALS
HEIGHT: 67 IN | SYSTOLIC BLOOD PRESSURE: 131 MMHG | DIASTOLIC BLOOD PRESSURE: 76 MMHG | HEART RATE: 69 BPM | WEIGHT: 250 LBS | BODY MASS INDEX: 39.24 KG/M2

## 2021-06-15 DIAGNOSIS — R42 VERTIGO: Primary | ICD-10-CM

## 2021-06-15 DIAGNOSIS — F41.9 ANXIETY: ICD-10-CM

## 2021-06-15 PROCEDURE — 3008F BODY MASS INDEX DOCD: CPT | Performed by: FAMILY MEDICINE

## 2021-06-15 PROCEDURE — 3078F DIAST BP <80 MM HG: CPT | Performed by: FAMILY MEDICINE

## 2021-06-15 PROCEDURE — 3075F SYST BP GE 130 - 139MM HG: CPT | Performed by: FAMILY MEDICINE

## 2021-06-15 PROCEDURE — 99214 OFFICE O/P EST MOD 30 MIN: CPT | Performed by: FAMILY MEDICINE

## 2021-06-15 RX ORDER — ESCITALOPRAM OXALATE 20 MG/1
20 TABLET ORAL DAILY
Qty: 90 TABLET | Refills: 0 | Status: SHIPPED | OUTPATIENT
Start: 2021-06-15 | End: 2021-09-08

## 2021-06-17 ENCOUNTER — TELEPHONE (OUTPATIENT)
Dept: CARDIOLOGY CLINIC | Facility: CLINIC | Age: 51
End: 2021-06-17

## 2021-06-17 ENCOUNTER — TELEPHONE (OUTPATIENT)
Dept: PULMONOLOGY | Facility: CLINIC | Age: 51
End: 2021-06-17

## 2021-06-17 ENCOUNTER — TELEPHONE (OUTPATIENT)
Dept: FAMILY MEDICINE CLINIC | Facility: CLINIC | Age: 51
End: 2021-06-17

## 2021-06-17 DIAGNOSIS — R42 VERTIGO: Primary | ICD-10-CM

## 2021-06-17 DIAGNOSIS — R42 DIZZINESS: ICD-10-CM

## 2021-06-17 NOTE — TELEPHONE ENCOUNTER
Spoke to patient and appointment scheduled with Dr. Millie Rosen 7/19/21 @ 4:30 pm. Verified date, time, place and parking. Patient verbalized understanding.

## 2021-06-17 NOTE — TELEPHONE ENCOUNTER
Dr. Fili Powell, patient was seen 6/15/21 for vertigo. Patient is requesting a referral for cardiology and pulmonology to determine what is causing his vertigo. Patient states that his insurance will be ending and he wants to get in for follow ups.     Please r

## 2021-06-17 NOTE — TELEPHONE ENCOUNTER
Patient states he is experiencing Vertigo and was told it might be heart related. Requesting to be seen sooner than next avail. CSS offered APRN but declined. Please call. Thank you.

## 2021-06-17 NOTE — TELEPHONE ENCOUNTER
Patient requesting to be seen sooner than next avail for Consult regarding COPD. Please call. Thank you.

## 2021-06-17 NOTE — TELEPHONE ENCOUNTER
Called Robert back, he has been experiencing vertigo for last few months, not able to work as . He had Mri, tried Meclizine , physical therapy so far. Asking for appointment with Christian Bro, offered appointment with [facility]  APN, but he declined.  Ass

## 2021-06-17 NOTE — TELEPHONE ENCOUNTER
Spoke to patient who has been complaining of vertigo for 6 months. Patient has seen his PCP, ENT, Neurology and an Audiologist and doesn't know the cause of it. Patient has also done 8 sessions of vestibular therapy with no improvement.  Patient has cardiol

## 2021-06-18 ENCOUNTER — TELEPHONE (OUTPATIENT)
Dept: FAMILY MEDICINE CLINIC | Facility: CLINIC | Age: 51
End: 2021-06-18

## 2021-06-18 NOTE — TELEPHONE ENCOUNTER
Good Morning,     Patient's HMO plan would like to confirm the following, in order for possible approval for Chiropractor. This request was reviewed by the Medical Director, Annamaria Owens, he is requesting additional information.  Per MD, not sure if indicat

## 2021-06-20 NOTE — PROGRESS NOTES
HPI/Subjective:   Patient ID: Lorena Troy is a 46year old male. Patient continues not feeling well. Tinnitus is still there, hearing loss just slightly imporved. Vertigo continues. States that physical therapy was not helpful.    Very stressed abo DAY 90 tablet 1   • METOPROLOL TARTRATE 50 MG Oral Tab TAKE 1 TABLET BY MOUTH TWICE A  tablet 1   • ASPIRIN LOW DOSE 81 MG Oral Tab EC TAKE 1 TABLET BY MOUTH EVERY DAY 90 tablet 1   • lisinopril 30 MG Oral Tab Take 1 tablet (30 mg total) by mouth da

## 2021-06-21 ENCOUNTER — OFFICE VISIT (OUTPATIENT)
Dept: GASTROENTEROLOGY | Facility: CLINIC | Age: 51
End: 2021-06-21
Payer: MEDICAID

## 2021-06-21 VITALS
HEIGHT: 67 IN | HEART RATE: 60 BPM | SYSTOLIC BLOOD PRESSURE: 146 MMHG | DIASTOLIC BLOOD PRESSURE: 82 MMHG | BODY MASS INDEX: 40.81 KG/M2 | WEIGHT: 260 LBS

## 2021-06-21 DIAGNOSIS — R79.89 ABNORMAL LIVER FUNCTION TEST: Primary | ICD-10-CM

## 2021-06-21 DIAGNOSIS — K76.0 FATTY LIVER: ICD-10-CM

## 2021-06-21 PROCEDURE — 3008F BODY MASS INDEX DOCD: CPT | Performed by: INTERNAL MEDICINE

## 2021-06-21 PROCEDURE — 3077F SYST BP >= 140 MM HG: CPT | Performed by: INTERNAL MEDICINE

## 2021-06-21 PROCEDURE — 99243 OFF/OP CNSLTJ NEW/EST LOW 30: CPT | Performed by: INTERNAL MEDICINE

## 2021-06-21 PROCEDURE — 3079F DIAST BP 80-89 MM HG: CPT | Performed by: INTERNAL MEDICINE

## 2021-06-21 NOTE — TELEPHONE ENCOUNTER
Spoke to patient and informed him that chiropractic services for vertigo isn't covered under his insurance. He said he will go to a different chiropractor that charges $50 and will pay for it out of pocket.

## 2021-06-21 NOTE — TELEPHONE ENCOUNTER
Please call the patient and let him know that insurance does not cover chiropractor for this purpose.    It covers for back pain etc though

## 2021-06-24 NOTE — PROGRESS NOTES
HPI/Subjective:   Patient ID: Rasheeda Gee is a 46year old male. HPI  The patient is seen today at the request of Dr. Charo Estes for elevated liver chemistry tests and suspected nonalcoholic fatty liver disease.     The patient has been under the previ (117.9 kg)  04/08/21 : 260 lb (117.9 kg)  03/16/21 : 265 lb (120.2 kg)  01/23/21 : 265 lb (120.2 kg)    Body mass index is 40.72 kg/m².        Current Outpatient Medications   Medication Sig Dispense Refill   • escitalopram (LEXAPRO) 20 MG Oral Tab Take 1 t No oropharyngeal exudate. Eyes:      General: No scleral icterus. Conjunctiva/sclera: Conjunctivae normal.   Neck:      Thyroid: No thyromegaly. Cardiovascular:      Rate and Rhythm: Normal rate and regular rhythm.       Heart sounds: Normal heart s mOsm/kg 291  288   eGFR NON-AFR. AMERICAN      >=60 88  78   eGFR       >=60 101  91   Patient Fasting?        No  Yes   Bilirubin, Direct      0.0 - 0.2 mg/dL  0.1    Hepatitis A Virus Ab, Total      Nonreactive  Nonreactive     HBSAg Sonja HEPATITIS B SURFACE AB QUANT      mIU/mL     HCV AB      Nonreactive      Iron, Serum      65 - 175 ug/dL     Transferrin      200 - 360 mg/dL     Iron Bind. Cap.(TIBC)      240 - 450 ug/dL     Iron Saturation      20 - 50 %     Ceruloplasmin      20.0 - Dictated by (CST): Diane Burks MD on 3/31/2021 at 9:02 AM       Finalized by (CST): Diane Burks MD on 3/31/2021 at 9:04 AM                 Assessment & Plan:   Abnormal liver function test  (primary encounter diagnosis)  Fatty liver  Upon routine

## 2021-06-24 NOTE — PATIENT INSTRUCTIONS
1.  Diet, exercise and weight loss. 2.  Limit and preferably eliminate alcohol intake. 3.  Repeat laboratory blood tests in the next several weeks.

## 2021-07-06 ENCOUNTER — OFFICE VISIT (OUTPATIENT)
Dept: CARDIOLOGY CLINIC | Facility: CLINIC | Age: 51
End: 2021-07-06
Payer: MEDICAID

## 2021-07-06 VITALS
BODY MASS INDEX: 40.81 KG/M2 | HEART RATE: 58 BPM | RESPIRATION RATE: 18 BRPM | HEIGHT: 67 IN | SYSTOLIC BLOOD PRESSURE: 168 MMHG | WEIGHT: 260 LBS | DIASTOLIC BLOOD PRESSURE: 90 MMHG

## 2021-07-06 DIAGNOSIS — R42 DIZZINESS: ICD-10-CM

## 2021-07-06 DIAGNOSIS — I10 ESSENTIAL HYPERTENSION, BENIGN: Primary | ICD-10-CM

## 2021-07-06 DIAGNOSIS — I25.10 ATHEROSCLEROSIS OF NATIVE CORONARY ARTERY OF NATIVE HEART WITHOUT ANGINA PECTORIS: ICD-10-CM

## 2021-07-06 DIAGNOSIS — I10 BENIGN HYPERTENSION: ICD-10-CM

## 2021-07-06 PROCEDURE — 3008F BODY MASS INDEX DOCD: CPT | Performed by: INTERNAL MEDICINE

## 2021-07-06 PROCEDURE — 3080F DIAST BP >= 90 MM HG: CPT | Performed by: INTERNAL MEDICINE

## 2021-07-06 PROCEDURE — 99214 OFFICE O/P EST MOD 30 MIN: CPT | Performed by: INTERNAL MEDICINE

## 2021-07-06 PROCEDURE — 3077F SYST BP >= 140 MM HG: CPT | Performed by: INTERNAL MEDICINE

## 2021-07-06 RX ORDER — PYRIDOXINE HCL (VITAMIN B6) 50 MG
TABLET ORAL
COMMUNITY

## 2021-07-06 NOTE — PATIENT INSTRUCTIONS
Clarify with primary when last cholesterol done and recheck soon.   Labs from February did not include a cholesterol    Continue working on not smoking    Schedule carotid ultrasound    Call if need stress test prior to next visit    Monitor and record rest

## 2021-07-06 NOTE — PROGRESS NOTES
[facility] CLINIC  PROGRESS NOTE    Yahir Bethea is a 46year old male. Patient presents with:   Follow - Up  Hypertension  CAD  Vertigo: vertigo since January 2021     HPI:   This is a pleasant 46year old male with coronary disease status post bypass in daily. 90 tablet 1   • Ascorbic Acid (VITAMIN C) 1000 MG Oral Tab Take 1,000 mg by mouth daily. • omega-3 fatty acids 1000 MG Oral Cap Take 3,000 mg by mouth daily. • Multiple Vitamin (MULTI-VITAMINS) Oral Tab Take  by mouth.      • escitalopram ( throat are clear  NECK: supple,no adenopathy,no bruits  LUNGS: clear to auscultation  CARDIO: regular rate and rhythm,nl S1,S2,no murmur  GI: good BS's,no masses, HSM or tenderness, obesity palpation limited  EXTREMITIES: no cyanosis, clubbing, trace ankle

## 2021-07-14 RX ORDER — ALBUTEROL SULFATE 90 UG/1
2 AEROSOL, METERED RESPIRATORY (INHALATION) EVERY 6 HOURS PRN
Qty: 1 EACH | Refills: 2 | Status: SHIPPED | OUTPATIENT
Start: 2021-07-14 | End: 2021-07-16

## 2021-07-14 NOTE — TELEPHONE ENCOUNTER
•  Albuterol Sulfate  (90 Base) MCG/ACT Inhalation Aero Soln, Inhale 2 puffs into the lungs every 6 (six) hours as needed for Wheezing., Disp: 1 each, Rfl: 2

## 2021-07-16 ENCOUNTER — TELEPHONE (OUTPATIENT)
Dept: FAMILY MEDICINE CLINIC | Facility: CLINIC | Age: 51
End: 2021-07-16

## 2021-07-16 RX ORDER — ALBUTEROL SULFATE 90 UG/1
2 AEROSOL, METERED RESPIRATORY (INHALATION) EVERY 6 HOURS PRN
Qty: 1 EACH | Refills: 2 | Status: CANCELLED | OUTPATIENT
Start: 2021-07-16

## 2021-07-16 RX ORDER — ALBUTEROL SULFATE 90 UG/1
2 AEROSOL, METERED RESPIRATORY (INHALATION) EVERY 6 HOURS PRN
Qty: 1 EACH | Refills: 2 | Status: SHIPPED | OUTPATIENT
Start: 2021-07-16 | End: 2021-10-05

## 2021-07-16 NOTE — TELEPHONE ENCOUNTER
Patient on Medicaid, [facility] doesn't accept this . Needs medication switched to [facility]. Contacted [facility] and informed to cancel order. RN will resend inhaler to requested [facility] which was added to file. Patient aware of plan.

## 2021-07-21 ENCOUNTER — TELEPHONE (OUTPATIENT)
Dept: GASTROENTEROLOGY | Facility: CLINIC | Age: 51
End: 2021-07-21

## 2021-07-21 NOTE — TELEPHONE ENCOUNTER
Overdue reminder letter mailed out to patient & sent via [address]      Labs:   ACTIN (SMOOTH MUSCLE) ANTIBODY   ALPHA-1-ANTITRYPSIN, SERUM   FERRITIN   HEPATIC FUNCTION PANEL (7)   MITOCHONDRIAL (M2) ANTIBODY

## 2021-07-28 RX ORDER — TIOTROPIUM BROMIDE 18 UG/1
1 CAPSULE ORAL; RESPIRATORY (INHALATION) DAILY
Qty: 90 CAPSULE | Refills: 0 | Status: SHIPPED | OUTPATIENT
Start: 2021-07-28 | End: 2021-10-07

## 2021-08-11 ENCOUNTER — TELEPHONE (OUTPATIENT)
Dept: FAMILY MEDICINE CLINIC | Facility: CLINIC | Age: 51
End: 2021-08-11

## 2021-08-11 RX ORDER — ASPIRIN 81 MG/1
81 TABLET ORAL DAILY
Qty: 90 TABLET | Refills: 1 | Status: SHIPPED | OUTPATIENT
Start: 2021-08-11 | End: 2021-10-07

## 2021-08-11 NOTE — TELEPHONE ENCOUNTER
Refill passed per [employer], Revee protocol. Requested Prescriptions   Pending Prescriptions Disp Refills    ASPIRIN 81 MG Oral Tab EC [Pharmacy Med Name: [facility] ASPIRIN EC 81 MG TABLET] 90 tablet 1     Sig: TAKE 1 TABLET BY MOUTH EVERY DAY        Aspirin Protocol Passed - 8/11/2021 12:10 AM        Passed - Appointment in past 6 or next 3 months               Future Appointments         Provider Department Appt Notes    In 4 weeks ADO US [place] [facility] Ultrasound - Arpit Nothing,but I also have lab work for blood.  Can I make it for same day 1/2 hrs before this test?            Recent Outpatient Visits              1 month ago Essential hypertension, benign    [facility] James Kevin MD    Office Visit    1 month ago Abnormal liver function test    Logan Parikh MD    Office Visit    1 month ago Vertigo    Emely Urrutia MD    Office Visit    2 months ago [facility], [address], Judy Curry MD    Office Visit    3 months ago [facility] Audiology Rajan Diaz    Office Visit

## 2021-08-17 RX ORDER — LISINOPRIL 30 MG/1
30 TABLET ORAL DAILY
Qty: 90 TABLET | Refills: 1 | Status: SHIPPED | OUTPATIENT
Start: 2021-08-17 | End: 2021-10-07

## 2021-08-17 NOTE — TELEPHONE ENCOUNTER
Refilled per [facility], [facility] protocol.   Requested Prescriptions   Pending Prescriptions Disp Refills    LISINOPRIL 30 MG Oral Tab [Pharmacy Med Name: LISINOPRIL 30 MG TABLET] 90 tablet 1     Sig: TAKE 1 TABLET BY MOUTH EVERY DAY        Hypertensive Medicat

## 2021-09-02 ENCOUNTER — TELEPHONE (OUTPATIENT)
Dept: PULMONOLOGY | Facility: CLINIC | Age: 51
End: 2021-09-02

## 2021-09-08 RX ORDER — ESCITALOPRAM OXALATE 20 MG/1
20 TABLET ORAL DAILY
Qty: 90 TABLET | Refills: 0 | Status: SHIPPED | OUTPATIENT
Start: 2021-09-08 | End: 2021-10-07

## 2021-09-08 NOTE — TELEPHONE ENCOUNTER
Spoke with patient informed him of Dr. Garcia Core recommendation \" The device is very safe to use as long as they do not use an ozone based cleaning system.   It is thought that the ozone cleaning system resulted in premature degradation of one of the filters

## 2021-09-08 NOTE — TELEPHONE ENCOUNTER
Refill passed per [address] [place] protocol.     Requested Prescriptions   Pending Prescriptions Disp Refills    ESCITALOPRAM 20 MG Oral Tab [Pharmacy Med Name: ESCITALOPRAM 20 MG TABLET] 90 tablet 0     Sig: TAKE 1 TABLET BY MOUTH EVERY DAY        Psychiatr

## 2021-09-09 ENCOUNTER — LAB ENCOUNTER (OUTPATIENT)
Dept: LAB | Age: 51
End: 2021-09-09
Attending: INTERNAL MEDICINE
Payer: COMMERCIAL

## 2021-09-09 ENCOUNTER — PATIENT MESSAGE (OUTPATIENT)
Dept: CARDIOLOGY CLINIC | Facility: CLINIC | Age: 51
End: 2021-09-09

## 2021-09-09 ENCOUNTER — HOSPITAL ENCOUNTER (OUTPATIENT)
Dept: ULTRASOUND IMAGING | Age: 51
Discharge: HOME | End: 2021-09-09
Attending: INTERNAL MEDICINE
Payer: COMMERCIAL

## 2021-09-09 DIAGNOSIS — I25.10 ATHEROSCLEROSIS OF NATIVE CORONARY ARTERY OF NATIVE HEART WITHOUT ANGINA PECTORIS: ICD-10-CM

## 2021-09-09 DIAGNOSIS — I10 BENIGN HYPERTENSION: ICD-10-CM

## 2021-09-09 DIAGNOSIS — I10 ESSENTIAL HYPERTENSION, BENIGN: ICD-10-CM

## 2021-09-09 DIAGNOSIS — R42 DIZZINESS: ICD-10-CM

## 2021-09-09 DIAGNOSIS — R79.89 ABNORMAL LIVER FUNCTION TEST: ICD-10-CM

## 2021-09-09 LAB
ALBUMIN SERPL-MCNC: 4.3 G/DL (ref 3.4–5)
ALP LIVER SERPL-CCNC: 125 U/L
ALT SERPL-CCNC: 180 U/L
AST SERPL-CCNC: 102 U/L (ref 15–37)
BILIRUB DIRECT SERPL-MCNC: 0.2 MG/DL (ref 0–0.2)
BILIRUB SERPL-MCNC: 0.7 MG/DL (ref 0.1–2)
CHOLEST SMN-MCNC: 185 MG/DL (ref ?–200)
DEPRECATED HBV CORE AB SER IA-ACNC: 451.4 NG/ML
HDLC SERPL-MCNC: 40 MG/DL (ref 40–59)
LDLC SERPL CALC-MCNC: 88 MG/DL (ref ?–100)
M PROTEIN MFR SERPL ELPH: 7.8 G/DL (ref 6.4–8.2)
NONHDLC SERPL-MCNC: 145 MG/DL (ref ?–130)
PATIENT FASTING Y/N/NP: YES
TRIGL SERPL-MCNC: 345 MG/DL (ref 30–149)
VLDLC SERPL CALC-MCNC: 56 MG/DL (ref 0–30)

## 2021-09-09 PROCEDURE — 36415 COLL VENOUS BLD VENIPUNCTURE: CPT

## 2021-09-09 PROCEDURE — 93880 EXTRACRANIAL BILAT STUDY: CPT | Performed by: INTERNAL MEDICINE

## 2021-09-09 PROCEDURE — 82728 ASSAY OF FERRITIN: CPT

## 2021-09-09 PROCEDURE — 86256 FLUORESCENT ANTIBODY TITER: CPT

## 2021-09-09 PROCEDURE — 82103 ALPHA-1-ANTITRYPSIN TOTAL: CPT

## 2021-09-09 PROCEDURE — 86255 FLUORESCENT ANTIBODY SCREEN: CPT

## 2021-09-09 PROCEDURE — 80061 LIPID PANEL: CPT

## 2021-09-09 PROCEDURE — 80076 HEPATIC FUNCTION PANEL: CPT

## 2021-09-09 NOTE — TELEPHONE ENCOUNTER
From: Lorena Troy  To: Zuleika Freeman MD  Sent: 9/9/2021 2:12 PM CDT  Subject: Test Results Question    So is this ok or not?

## 2021-09-10 LAB
A1AT SERPL-MCNC: 100 MG/DL (ref 90–200)
MITOCHONDRIA AB TITR SER: <20 {TITER}
SMOOTH MUSCLE AB TITR SER: <20 {TITER}

## 2021-09-11 DIAGNOSIS — K76.0 FATTY LIVER: Primary | ICD-10-CM

## 2021-09-13 RX ORDER — ALPRAZOLAM 1 MG/1
TABLET ORAL
Qty: 30 TABLET | Refills: 0 | Status: SHIPPED | OUTPATIENT
Start: 2021-09-13 | End: 2021-10-07

## 2021-09-22 RX ORDER — METOPROLOL TARTRATE 50 MG/1
TABLET, FILM COATED ORAL
Qty: 180 TABLET | Refills: 1 | Status: SHIPPED | OUTPATIENT
Start: 2021-09-22 | End: 2021-10-06

## 2021-09-22 RX ORDER — ATORVASTATIN CALCIUM 80 MG/1
TABLET, FILM COATED ORAL
Qty: 90 TABLET | Refills: 1 | Status: SHIPPED | OUTPATIENT
Start: 2021-09-22 | End: 2021-10-06

## 2021-09-22 NOTE — TELEPHONE ENCOUNTER
Refill passed per [facility], [facility] protocol.       Requested Prescriptions   Pending Prescriptions Disp Refills    METOPROLOL TARTRATE 50 MG Oral Tab [Pharmacy Med Name: METOPROLOL TARTRATE 50 MG TAB] 180 tablet 1     Sig: TAKE 1 TABLET BY MOUTH TWICE A DAY

## 2021-09-22 NOTE — TELEPHONE ENCOUNTER
Please review. Protocol failed/ No protocol      Requested Prescriptions   Pending Prescriptions Disp Refills    ATORVASTATIN 80 MG Oral Tab [Pharmacy Med Name: ATORVASTATIN 80 MG TABLET] 90 tablet 1     Sig: TAKE 1 TABLET BY MOUTH EVERY DAY        Tricia

## 2021-10-05 RX ORDER — ALBUTEROL SULFATE 90 UG/1
2 AEROSOL, METERED RESPIRATORY (INHALATION) EVERY 6 HOURS PRN
Qty: 6.7 EACH | Refills: 2 | Status: SHIPPED | OUTPATIENT
Start: 2021-10-05 | End: 2021-10-07

## 2021-10-05 NOTE — TELEPHONE ENCOUNTER
Please review; protocol failed/ no protocol.     Requested Prescriptions   Pending Prescriptions Disp Refills    ALBUTEROL 108 (90 Base) MCG/ACT Inhalation Aero Soln [Pharmacy Med Name: ALBUTEROL HFA (PROVENTIL) INH] 6.7 each 2     Sig: Inhale 2 puffs into

## 2021-10-06 NOTE — TELEPHONE ENCOUNTER
Patient is requesting the following medication refills to be sent to his new preferred pharmacy on file - [facility].     Albuterol  - only has enough 1 week  Aspirin 81 mg  - only two weeks left    escitalopram 20 mg  ALPRAZOLAM 1 mg  METOPROLOL 50 mg  ATOR

## 2021-10-07 RX ORDER — ALPRAZOLAM 1 MG/1
1 TABLET ORAL NIGHTLY PRN
Qty: 30 TABLET | Refills: 0 | Status: SHIPPED | OUTPATIENT
Start: 2021-10-07

## 2021-10-07 RX ORDER — ESCITALOPRAM OXALATE 20 MG/1
20 TABLET ORAL DAILY
Qty: 90 TABLET | Refills: 0 | Status: SHIPPED | OUTPATIENT
Start: 2021-10-07 | End: 2021-12-04

## 2021-10-07 RX ORDER — ASPIRIN 81 MG/1
81 TABLET ORAL DAILY
Qty: 90 TABLET | Refills: 1 | Status: SHIPPED | OUTPATIENT
Start: 2021-10-07

## 2021-10-07 RX ORDER — TIOTROPIUM BROMIDE 18 UG/1
1 CAPSULE ORAL; RESPIRATORY (INHALATION) DAILY
Qty: 90 CAPSULE | Refills: 0 | Status: SHIPPED | OUTPATIENT
Start: 2021-10-07 | End: 2021-11-02

## 2021-10-07 RX ORDER — ALBUTEROL SULFATE 90 UG/1
2 AEROSOL, METERED RESPIRATORY (INHALATION) EVERY 6 HOURS PRN
Qty: 6.7 EACH | Refills: 2 | Status: SHIPPED | OUTPATIENT
Start: 2021-10-07

## 2021-10-07 RX ORDER — ATORVASTATIN CALCIUM 80 MG/1
80 TABLET, FILM COATED ORAL DAILY
Qty: 90 TABLET | Refills: 1 | Status: SHIPPED | OUTPATIENT
Start: 2021-10-07

## 2021-10-07 RX ORDER — METOPROLOL TARTRATE 50 MG/1
50 TABLET, FILM COATED ORAL 2 TIMES DAILY
Qty: 180 TABLET | Refills: 1 | Status: SHIPPED | OUTPATIENT
Start: 2021-10-07

## 2021-10-07 RX ORDER — FLUTICASONE PROPIONATE 220 UG/1
AEROSOL, METERED RESPIRATORY (INHALATION)
Qty: 3 EACH | Refills: 0 | Status: SHIPPED | OUTPATIENT
Start: 2021-10-07

## 2021-10-07 RX ORDER — LISINOPRIL 30 MG/1
30 TABLET ORAL DAILY
Qty: 90 TABLET | Refills: 1 | Status: SHIPPED | OUTPATIENT
Start: 2021-10-07

## 2021-10-13 ENCOUNTER — TELEPHONE (OUTPATIENT)
Dept: GASTROENTEROLOGY | Facility: CLINIC | Age: 51
End: 2021-10-13

## 2021-10-18 ENCOUNTER — TELEPHONE (OUTPATIENT)
Dept: ADMINISTRATIVE | Age: 51
End: 2021-10-18

## 2021-10-18 DIAGNOSIS — G47.33 OSA (OBSTRUCTIVE SLEEP APNEA): Primary | ICD-10-CM

## 2021-10-18 NOTE — TELEPHONE ENCOUNTER
Dr. Ruben Mantilla,    The patient's referral for CPAP supplies has . The DME vendor called to request referral.    Pended referral please review diagnosis and sign off if you agree. Thank you.   Nicholas Patricia

## 2021-11-02 RX ORDER — TIOTROPIUM BROMIDE 18 UG/1
1 CAPSULE ORAL; RESPIRATORY (INHALATION) DAILY
Qty: 90 CAPSULE | Refills: 1 | Status: SHIPPED | OUTPATIENT
Start: 2021-11-02

## 2021-11-02 NOTE — TELEPHONE ENCOUNTER
Refilled per [facility], [facility] protocol.   Requested Prescriptions   Pending Prescriptions Disp Refills    SPIRIVA HANDIHALER 18 MCG Inhalation Cap [Pharmacy Med Name: SPIRIVA 18 MCG CP-HANDIHALER] 90 capsule 0     Sig: INHALE 1 CAPSULE VIA HANDIHALER ONCE DA

## 2021-12-04 RX ORDER — ESCITALOPRAM OXALATE 20 MG/1
20 TABLET ORAL DAILY
Qty: 90 TABLET | Refills: 0 | Status: SHIPPED | OUTPATIENT
Start: 2021-12-04

## 2021-12-04 NOTE — TELEPHONE ENCOUNTER
Refill passed per [address] [place] protocol.   Requested Prescriptions   Pending Prescriptions Disp Refills    ESCITALOPRAM 20 MG Oral Tab [Pharmacy Med Name: ESCITALOPRAM 20 MG TABLET] 90 tablet 0     Sig: TAKE 1 TABLET BY MOUTH EVERY DAY        Psychiatric

## 2021-12-27 ENCOUNTER — NURSE TRIAGE (OUTPATIENT)
Dept: FAMILY MEDICINE CLINIC | Facility: CLINIC | Age: 51
End: 2021-12-27

## 2021-12-27 DIAGNOSIS — R19.7 DIARRHEA, UNSPECIFIED TYPE: Primary | ICD-10-CM

## 2021-12-27 NOTE — TELEPHONE ENCOUNTER
Action Requested: Summary for Provider     []  Critical Lab, Recommendations Needed  [] Need Additional Advice  []   FYI    []   Need Orders  [] Need Medications Sent to Pharmacy  []  Other     SUMMARY:Pt requesting stool test for bacteria/parasites  Sta

## 2021-12-31 ENCOUNTER — LAB ENCOUNTER (OUTPATIENT)
Dept: LAB | Age: 51
End: 2021-12-31
Attending: FAMILY MEDICINE
Payer: COMMERCIAL

## 2021-12-31 DIAGNOSIS — R19.7 DIARRHEA, UNSPECIFIED TYPE: ICD-10-CM

## 2021-12-31 LAB
CRYPTOSP AG STL QL IA: NEGATIVE
G LAMBLIA AG STL QL IA: NEGATIVE

## 2021-12-31 PROCEDURE — 87329 GIARDIA AG IA: CPT

## 2021-12-31 PROCEDURE — 87046 STOOL CULTR AEROBIC BACT EA: CPT

## 2021-12-31 PROCEDURE — 87045 FECES CULTURE AEROBIC BACT: CPT

## 2021-12-31 PROCEDURE — 87272 CRYPTOSPORIDIUM AG IF: CPT

## 2021-12-31 PROCEDURE — 87427 SHIGA-LIKE TOXIN AG IA: CPT

## 2022-01-12 ENCOUNTER — TELEPHONE (OUTPATIENT)
Dept: FAMILY MEDICINE CLINIC | Facility: CLINIC | Age: 52
End: 2022-01-12

## 2022-01-12 DIAGNOSIS — R19.7 DIARRHEA, UNSPECIFIED TYPE: Primary | ICD-10-CM

## 2022-02-08 ENCOUNTER — OFFICE VISIT (OUTPATIENT)
Dept: GASTROENTEROLOGY | Facility: CLINIC | Age: 52
End: 2022-02-08
Payer: COMMERCIAL

## 2022-02-08 ENCOUNTER — TELEPHONE (OUTPATIENT)
Dept: GASTROENTEROLOGY | Facility: CLINIC | Age: 52
End: 2022-02-08

## 2022-02-08 VITALS
DIASTOLIC BLOOD PRESSURE: 77 MMHG | HEART RATE: 65 BPM | HEIGHT: 67 IN | SYSTOLIC BLOOD PRESSURE: 137 MMHG | BODY MASS INDEX: 40.97 KG/M2 | WEIGHT: 261 LBS

## 2022-02-08 DIAGNOSIS — K75.81 NASH (NONALCOHOLIC STEATOHEPATITIS): Primary | ICD-10-CM

## 2022-02-08 DIAGNOSIS — R19.7 DIARRHEA, UNSPECIFIED TYPE: ICD-10-CM

## 2022-02-08 PROCEDURE — 99215 OFFICE O/P EST HI 40 MIN: CPT | Performed by: NURSE PRACTITIONER

## 2022-02-08 PROCEDURE — 3075F SYST BP GE 130 - 139MM HG: CPT | Performed by: NURSE PRACTITIONER

## 2022-02-08 PROCEDURE — 3008F BODY MASS INDEX DOCD: CPT | Performed by: NURSE PRACTITIONER

## 2022-02-08 PROCEDURE — 3078F DIAST BP <80 MM HG: CPT | Performed by: NURSE PRACTITIONER

## 2022-02-08 RX ORDER — POLYETHYLENE GLYCOL 3350, SODIUM CHLORIDE, SODIUM BICARBONATE, POTASSIUM CHLORIDE 420; 11.2; 5.72; 1.48 G/4L; G/4L; G/4L; G/4L
POWDER, FOR SOLUTION ORAL
Qty: 4000 ML | Refills: 0 | Status: SHIPPED | OUTPATIENT
Start: 2022-02-08

## 2022-02-08 NOTE — TELEPHONE ENCOUNTER
Scheduled for:  Colonoscopy 82435  Provider Name:  Dr Drew John  Date:  4/11/2022  Location:  [facility]  Sedation:  MAC  Time:  12:30pm (pt is aware to arrive at 11:30am)  Prep:  Trilyte  Meds/Allergies Reconciled?:  Yaima/NP reviewed. Diagnosis with codes:  Diarrhea R19.7  Was patient informed to call insurance with codes (Y/N):  Yes  Referral sent?:  Referral was sent at the time of electronic surgical scheduling. [facility] or [address] [place] [address] notified?:  I sent an electronic request to Endo Scheduling and received a confirmation today. Medication Orders:   This patient verbally confirmed that he is not taking:  Iron, blood thinners and is not diabetic  Not latex allergy, Not PCN allergy and does not have a pacemaker  Patient is aware to HOLD Lisinopril the day of the procedure   Pt is aware to NOT take iron pills, herbal meds and diet supplements for 7 days before exam. Also to NOT take any form of alcohol, recreational drugs and any forms of ED meds 24 hours before exam.      Misc Orders:  Patient is aware that the ENDO dept will call to schedule a COVID 19 test before the procedure      Further instructions given by staff:   I discussed the prep instructions with the patient at the time of the appointment which he verbally understood

## 2022-02-08 NOTE — PATIENT INSTRUCTIONS
-ultrasound elastography  -limit alcohol  -good blood sugar, cholesterol levels with    -avoid dairy, wheat  -x-ray to evaluate for overflow diarrhea  -labs    1. Schedule colonoscopy with MAC w/ first avail [Diagnosis: diarrhea]    2.  bowel prep from pharmacy (split trilyte)    3. Hold lisinopril AM of procedure    4. Read all bowel prep instructions carefully    5. AVOID seeds, nuts, popcorn, raw fruits and vegetables (cooked is okay) for 2-3 days before procedure    6. You will need to be tested for COVID within 72 hours of your procedure. You will be contacted with instructions on how to do this.       >>>Please note: if you were prescribed Suprep for the bowel prep and it is too expensive or not covered by insurance, it is okay to substitute Trilyte (or any similar generic prep). This can be done by notifying the pharmacy or calling our office.

## 2022-02-16 RX ORDER — ALBUTEROL SULFATE 90 UG/1
2 AEROSOL, METERED RESPIRATORY (INHALATION) EVERY 6 HOURS PRN
Qty: 6.7 G | Refills: 0 | Status: SHIPPED | OUTPATIENT
Start: 2022-02-16 | End: 2022-03-22

## 2022-02-16 NOTE — TELEPHONE ENCOUNTER
Please review; protocol failed/No Protcol    Requested Prescriptions   Pending Prescriptions Disp Refills    ALBUTEROL 108 (90 Base) MCG/ACT Inhalation Aero Soln [Pharmacy Med Name: ALBUTEROL HFA 90 MCG INHALER] 6.7 g 0     Sig: INHALE 2 PUFFS INTO THE LUNGS EVERY 6 HOURS AS NEEDED FOR WHEEZING        Asthma & COPD Medication Protocol Failed - 2/16/2022 10:59 AM        Failed - Appointment in past 6 or next 3 months              Recent Outpatient Visits              1 week ago ROBB (nonalcoholic steatohepatitis)    [facility], [facility], [facility], Marya Eastman, LIANA    Office Visit    7 months ago Essential hypertension, benign    [facility]TY [facility] Tricia Frazier MD    Office Visit    8 months ago Abnormal liver function test    Tess Mccarty MD    Office Visit    8 months ago Vertigo    Marcy Pruitt MD    Office Visit    8 months ago [facility], [address] [place] [address],[address], [facility], Braxton Schwab, MD    Office Visit            Future Appointments         Provider Department Appt Notes    In 6 days [address]     In 2 weeks ADO XR [facility]     In 2 weeks [address] Lab Services Order in [facility] from Lucas Erickson 33 02/08/22.  TB    In 1 month ALBERTO, DIANA Tuttle. Thor Mcghee 57 GI PROCEDURE [email or web address]

## 2022-02-21 ENCOUNTER — PATIENT MESSAGE (OUTPATIENT)
Dept: FAMILY MEDICINE CLINIC | Facility: CLINIC | Age: 52
End: 2022-02-21

## 2022-02-21 NOTE — TELEPHONE ENCOUNTER
From: Rut Hogue  To: Marcelo Frazier MD  Sent: 2/21/2022 10:10 AM CST  Subject: Medical questions     Hi Dr Lukasz Mullins, I have had more problems with my Virtigo, it's getting worse. I also have more problems with my copd. I have gotten Medicade but have not received my card yet. I had to cancel my [employer] account because they told me I can't have both. Just walking 4 car lengths to throw garbage away I have to stop or I will fall. Also bending over is a problem. Spiriva is 490.00 dollars with manufacturing coupon, otherwise it is 700.00. I need to also come in for my annual exam. So I don't know what I need to do first. Also Disability needs you to fill out some forms in order to see if I am eligible. Still haven't worked or collected unemployment or disability, so no income since January 22 of 2021.  Please tell me what I need to do first. Thanks

## 2022-03-04 ENCOUNTER — OFFICE VISIT (OUTPATIENT)
Dept: FAMILY MEDICINE CLINIC | Facility: CLINIC | Age: 52
End: 2022-03-04
Payer: MEDICAID

## 2022-03-04 VITALS
HEIGHT: 67 IN | RESPIRATION RATE: 16 BRPM | BODY MASS INDEX: 40.49 KG/M2 | HEART RATE: 64 BPM | DIASTOLIC BLOOD PRESSURE: 73 MMHG | WEIGHT: 258 LBS | SYSTOLIC BLOOD PRESSURE: 156 MMHG

## 2022-03-04 DIAGNOSIS — J43.9 PULMONARY EMPHYSEMA, UNSPECIFIED EMPHYSEMA TYPE (HCC): ICD-10-CM

## 2022-03-04 DIAGNOSIS — I51.9 HEART DISEASE: Primary | ICD-10-CM

## 2022-03-04 DIAGNOSIS — R42 VERTIGO: ICD-10-CM

## 2022-03-04 PROBLEM — J44.9 CHRONIC OBSTRUCTIVE PULMONARY DISEASE, UNSPECIFIED (HCC): Status: ACTIVE | Noted: 2022-03-04

## 2022-03-04 PROCEDURE — 3008F BODY MASS INDEX DOCD: CPT | Performed by: FAMILY MEDICINE

## 2022-03-04 PROCEDURE — 3077F SYST BP >= 140 MM HG: CPT | Performed by: FAMILY MEDICINE

## 2022-03-04 PROCEDURE — 99214 OFFICE O/P EST MOD 30 MIN: CPT | Performed by: FAMILY MEDICINE

## 2022-03-04 PROCEDURE — 3078F DIAST BP <80 MM HG: CPT | Performed by: FAMILY MEDICINE

## 2022-03-08 ENCOUNTER — LAB ENCOUNTER (OUTPATIENT)
Dept: LAB | Age: 52
End: 2022-03-08
Attending: NURSE PRACTITIONER
Payer: MEDICAID

## 2022-03-08 ENCOUNTER — HOSPITAL ENCOUNTER (OUTPATIENT)
Dept: GENERAL RADIOLOGY | Age: 52
Discharge: HOME | End: 2022-03-08
Attending: NURSE PRACTITIONER
Payer: MEDICAID

## 2022-03-08 DIAGNOSIS — R19.7 DIARRHEA, UNSPECIFIED TYPE: ICD-10-CM

## 2022-03-08 DIAGNOSIS — R74.8 ELEVATED ALKALINE PHOSPHATASE LEVEL: ICD-10-CM

## 2022-03-08 DIAGNOSIS — R06.00 DYSPNEA, UNSPECIFIED TYPE: ICD-10-CM

## 2022-03-08 LAB
ALBUMIN SERPL-MCNC: 4 G/DL (ref 3.4–5)
ALBUMIN/GLOB SERPL: 1.3 {RATIO} (ref 1–2)
ALP LIVER SERPL-CCNC: 142 U/L
ALT SERPL-CCNC: 184 U/L
ANION GAP SERPL CALC-SCNC: 5 MMOL/L (ref 0–18)
AST SERPL-CCNC: 84 U/L (ref 15–37)
BASOPHILS # BLD AUTO: 0.08 X10(3) UL (ref 0–0.2)
BASOPHILS NFR BLD AUTO: 1.1 %
BILIRUB SERPL-MCNC: 0.5 MG/DL (ref 0.1–2)
BUN BLD-MCNC: 21 MG/DL (ref 7–18)
BUN/CREAT SERPL: 23.1 (ref 10–20)
CALCIUM BLD-MCNC: 9.3 MG/DL (ref 8.5–10.1)
CHLORIDE SERPL-SCNC: 102 MMOL/L (ref 98–112)
CO2 SERPL-SCNC: 33 MMOL/L (ref 21–32)
CREAT BLD-MCNC: 0.91 MG/DL
CRP SERPL-MCNC: 0.89 MG/DL (ref ?–0.3)
DEPRECATED RDW RBC AUTO: 44.3 FL (ref 35.1–46.3)
EOSINOPHIL # BLD AUTO: 0.21 X10(3) UL (ref 0–0.7)
EOSINOPHIL NFR BLD AUTO: 2.9 %
ERYTHROCYTE [DISTWIDTH] IN BLOOD BY AUTOMATED COUNT: 12.8 % (ref 11–15)
FASTING STATUS PATIENT QL REPORTED: YES
GGT SERPL-CCNC: 309 U/L
GLOBULIN PLAS-MCNC: 3.2 G/DL (ref 2.8–4.4)
GLUCOSE BLD-MCNC: 112 MG/DL (ref 70–99)
HCT VFR BLD AUTO: 48.7 %
HGB BLD-MCNC: 16 G/DL
IGA SERPL-MCNC: 153 MG/DL (ref 70–312)
IMM GRANULOCYTES # BLD AUTO: 0.03 X10(3) UL (ref 0–1)
IMM GRANULOCYTES NFR BLD: 0.4 %
LYMPHOCYTES # BLD AUTO: 1.58 X10(3) UL (ref 1–4)
LYMPHOCYTES NFR BLD AUTO: 22 %
MCH RBC QN AUTO: 30.9 PG (ref 26–34)
MCHC RBC AUTO-ENTMCNC: 32.9 G/DL (ref 31–37)
MCV RBC AUTO: 94.2 FL
MONOCYTES # BLD AUTO: 0.54 X10(3) UL (ref 0.1–1)
MONOCYTES NFR BLD AUTO: 7.5 %
NEUTROPHILS # BLD AUTO: 4.73 X10 (3) UL (ref 1.5–7.7)
NEUTROPHILS # BLD AUTO: 4.73 X10(3) UL (ref 1.5–7.7)
NEUTROPHILS NFR BLD AUTO: 66.1 %
OSMOLALITY SERPL CALC.SUM OF ELEC: 294 MOSM/KG (ref 275–295)
PLATELET # BLD AUTO: 161 10(3)UL (ref 150–450)
POTASSIUM SERPL-SCNC: 4.5 MMOL/L (ref 3.5–5.1)
PROT SERPL-MCNC: 7.2 G/DL (ref 6.4–8.2)
RBC # BLD AUTO: 5.17 X10(6)UL
SODIUM SERPL-SCNC: 140 MMOL/L (ref 136–145)
TSI SER-ACNC: 1.49 MIU/ML (ref 0.36–3.74)
VIT B12 SERPL-MCNC: 1007 PG/ML (ref 193–986)
WBC # BLD AUTO: 7.2 X10(3) UL (ref 4–11)

## 2022-03-08 PROCEDURE — 80053 COMPREHEN METABOLIC PANEL: CPT

## 2022-03-08 PROCEDURE — 86364 TISS TRNSGLTMNASE EA IG CLAS: CPT

## 2022-03-08 PROCEDURE — 84443 ASSAY THYROID STIM HORMONE: CPT

## 2022-03-08 PROCEDURE — 86140 C-REACTIVE PROTEIN: CPT

## 2022-03-08 PROCEDURE — 82784 ASSAY IGA/IGD/IGG/IGM EACH: CPT

## 2022-03-08 PROCEDURE — 82977 ASSAY OF GGT: CPT

## 2022-03-08 PROCEDURE — 82607 VITAMIN B-12: CPT

## 2022-03-08 PROCEDURE — 85025 COMPLETE CBC W/AUTO DIFF WBC: CPT

## 2022-03-08 PROCEDURE — 74018 RADEX ABDOMEN 1 VIEW: CPT | Performed by: NURSE PRACTITIONER

## 2022-03-08 PROCEDURE — 36415 COLL VENOUS BLD VENIPUNCTURE: CPT

## 2022-03-11 ENCOUNTER — PATIENT MESSAGE (OUTPATIENT)
Dept: GASTROENTEROLOGY | Facility: CLINIC | Age: 52
End: 2022-03-11

## 2022-03-11 LAB — TTG IGA SER-ACNC: 0.2 U/ML (ref ?–7)

## 2022-03-11 NOTE — TELEPHONE ENCOUNTER
From: Jaxon Moore  To: Tena Acevedo. Carlos White  Sent: 3/11/2022 3:22 PM CST  Subject: Question regarding TISSUE TRANSGLUTAMINASE AB IGA    What does this mean?

## 2022-03-11 NOTE — TELEPHONE ENCOUNTER
Colette Ramon requesting further understanding on final lab results. Please advise.      Thank you     Component      Latest Ref Rng & Units 3/8/2022   C-REACTIVE PROTEIN      <0.30 mg/dL 0.89 (H)   TSH      0.358 - 3.740 mIU/mL 1.490   Vitamin B12      193 - 986 pg/mL 1,007 (H)   Tissue Transglutaminase IgA Ab      <7.0 U/mL 0.2   IMMUNOGLOBULIN A      70.00 - 312.00 mg/dL 153.00   GAMMA GLUTAMYL TRANSFERASE      15 - 85 U/L 309 (H)

## 2022-03-15 ENCOUNTER — ORDER TRANSCRIPTION (OUTPATIENT)
Dept: ADMINISTRATIVE | Facility: HOSPITAL | Age: 52
End: 2022-03-15

## 2022-03-15 ENCOUNTER — OFFICE VISIT (OUTPATIENT)
Dept: PULMONOLOGY | Facility: CLINIC | Age: 52
End: 2022-03-15
Payer: MEDICAID

## 2022-03-15 VITALS
RESPIRATION RATE: 16 BRPM | SYSTOLIC BLOOD PRESSURE: 161 MMHG | DIASTOLIC BLOOD PRESSURE: 81 MMHG | OXYGEN SATURATION: 97 % | HEIGHT: 67 IN | WEIGHT: 258 LBS | HEART RATE: 65 BPM | BODY MASS INDEX: 40.49 KG/M2

## 2022-03-15 DIAGNOSIS — J44.9 CHRONIC OBSTRUCTIVE PULMONARY DISEASE, UNSPECIFIED COPD TYPE (HCC): Primary | ICD-10-CM

## 2022-03-15 DIAGNOSIS — Z87.891 PERSONAL HISTORY OF TOBACCO USE, PRESENTING HAZARDS TO HEALTH: ICD-10-CM

## 2022-03-15 DIAGNOSIS — G47.33 OSA (OBSTRUCTIVE SLEEP APNEA): ICD-10-CM

## 2022-03-15 PROCEDURE — 3077F SYST BP >= 140 MM HG: CPT | Performed by: PHYSICIAN ASSISTANT

## 2022-03-15 PROCEDURE — 3008F BODY MASS INDEX DOCD: CPT | Performed by: PHYSICIAN ASSISTANT

## 2022-03-15 PROCEDURE — 3079F DIAST BP 80-89 MM HG: CPT | Performed by: PHYSICIAN ASSISTANT

## 2022-03-15 PROCEDURE — 94761 N-INVAS EAR/PLS OXIMETRY MLT: CPT | Performed by: PHYSICIAN ASSISTANT

## 2022-03-15 PROCEDURE — 99214 OFFICE O/P EST MOD 30 MIN: CPT | Performed by: PHYSICIAN ASSISTANT

## 2022-03-15 RX ORDER — FLUTICASONE FUROATE, UMECLIDINIUM BROMIDE AND VILANTEROL TRIFENATATE 100; 62.5; 25 UG/1; UG/1; UG/1
1 POWDER RESPIRATORY (INHALATION) DAILY
Qty: 1 EACH | Refills: 2 | Status: SHIPPED | OUTPATIENT
Start: 2022-03-15 | End: 2022-03-23

## 2022-03-15 RX ORDER — ALBUTEROL SULFATE 90 UG/1
AEROSOL, METERED RESPIRATORY (INHALATION)
Qty: 1 EACH | Refills: 2 | Status: SHIPPED | OUTPATIENT
Start: 2022-03-15

## 2022-03-15 NOTE — PROGRESS NOTES
Faxed completed and signed [place] tobacco Quitline treatment enrollment form. Fax confirmation received. Form sent to scanning.

## 2022-03-16 NOTE — TELEPHONE ENCOUNTER
Pt contacted by nuria. Discussed lab results and plan. Advise:  cln as discussed at tov to eval luminal inflammation  Ultrasound elastography  Stop b12 supplement    He verbalizes understanding of above and is in agreement with plan.

## 2022-03-17 ENCOUNTER — TELEPHONE (OUTPATIENT)
Dept: PULMONOLOGY | Facility: CLINIC | Age: 52
End: 2022-03-17

## 2022-03-17 NOTE — TELEPHONE ENCOUNTER
Patient calling for prior authorization for rx Trelegy, patient ask to process as soon as possible only has 1 week left of his medication. Please call at [phone],ROSANGELAJR.

## 2022-03-17 NOTE — TELEPHONE ENCOUNTER
Medication PA Requested:  Trelegy                                                        CoverMyMeds Used:  Key:   Quantity: 1 each  Day Supply: 30 days  Si puff daily  DX Code: J44.9                                  CPT code (if applicable):   Case Number/Pending Ref#:

## 2022-03-18 ENCOUNTER — TELEPHONE (OUTPATIENT)
Dept: PULMONOLOGY | Facility: CLINIC | Age: 52
End: 2022-03-18

## 2022-03-18 NOTE — TELEPHONE ENCOUNTER
Pt states pharmacy told him Edilberto Arcadia has to order all of the different strengths of patches to help quit smoking, and also for the gum or lozenges.  Please call

## 2022-03-18 NOTE — TELEPHONE ENCOUNTER
Spoke to pharmacy staff and requested fax to start PA. Paperwork received and RN attempted to call but closed for the day. Will call again Monday morning-patient informed.

## 2022-03-21 ENCOUNTER — HOSPITAL ENCOUNTER (OUTPATIENT)
Dept: GENERAL RADIOLOGY | Age: 52
Discharge: HOME | End: 2022-03-21
Attending: PHYSICIAN ASSISTANT
Payer: MEDICAID

## 2022-03-21 ENCOUNTER — LAB ENCOUNTER (OUTPATIENT)
Dept: LAB | Age: 52
End: 2022-03-21
Attending: PHYSICIAN ASSISTANT
Payer: MEDICAID

## 2022-03-21 DIAGNOSIS — J44.9 CHRONIC OBSTRUCTIVE PULMONARY DISEASE, UNSPECIFIED COPD TYPE (HCC): ICD-10-CM

## 2022-03-21 LAB — SARS-COV-2 RNA RESP QL NAA+PROBE: NOT DETECTED

## 2022-03-21 PROCEDURE — 71046 X-RAY EXAM CHEST 2 VIEWS: CPT | Performed by: PHYSICIAN ASSISTANT

## 2022-03-21 RX ORDER — POLYETHYLENE GLYCOL 3350 17 G
4 POWDER IN PACKET (EA) ORAL AS NEEDED
Qty: 108 EACH | Refills: 0 | Status: SHIPPED | OUTPATIENT
Start: 2022-03-21

## 2022-03-21 RX ORDER — NICOTINE 21 MG/24HR
PATCH, TRANSDERMAL 24 HOURS TRANSDERMAL
Qty: 14 PATCH | Refills: 0 | Status: SHIPPED | OUTPATIENT
Start: 2022-03-21

## 2022-03-21 NOTE — TELEPHONE ENCOUNTER
Spoke to patient-Trelegy was approved. Nothing further needed from [facility].

## 2022-03-21 NOTE — TELEPHONE ENCOUNTER
Patient calling for update on prior authorization, please call at [phone] or send mychart, thanks.

## 2022-03-21 NOTE — TELEPHONE ENCOUNTER
Spoke to [facility] PA services [phone] and PA for Trelegy completed. Will await decision, patient informed.

## 2022-03-22 ENCOUNTER — OFFICE VISIT (OUTPATIENT)
Dept: FAMILY MEDICINE CLINIC | Facility: CLINIC | Age: 52
End: 2022-03-22
Payer: MEDICAID

## 2022-03-22 VITALS
HEART RATE: 68 BPM | HEIGHT: 67 IN | WEIGHT: 258 LBS | DIASTOLIC BLOOD PRESSURE: 82 MMHG | SYSTOLIC BLOOD PRESSURE: 152 MMHG | BODY MASS INDEX: 40.49 KG/M2

## 2022-03-22 DIAGNOSIS — F41.9 ANXIETY: ICD-10-CM

## 2022-03-22 DIAGNOSIS — R42 VERTIGO: Primary | ICD-10-CM

## 2022-03-22 PROCEDURE — 3079F DIAST BP 80-89 MM HG: CPT | Performed by: FAMILY MEDICINE

## 2022-03-22 PROCEDURE — 3077F SYST BP >= 140 MM HG: CPT | Performed by: FAMILY MEDICINE

## 2022-03-22 PROCEDURE — 3008F BODY MASS INDEX DOCD: CPT | Performed by: FAMILY MEDICINE

## 2022-03-22 PROCEDURE — 99213 OFFICE O/P EST LOW 20 MIN: CPT | Performed by: FAMILY MEDICINE

## 2022-03-23 ENCOUNTER — TELEPHONE (OUTPATIENT)
Dept: PULMONOLOGY | Facility: CLINIC | Age: 52
End: 2022-03-23

## 2022-03-23 RX ORDER — FLUTICASONE PROPIONATE AND SALMETEROL 250; 50 UG/1; UG/1
1 POWDER RESPIRATORY (INHALATION) EVERY 12 HOURS SCHEDULED
Qty: 1 EACH | Refills: 2 | Status: SHIPPED | OUTPATIENT
Start: 2022-03-23

## 2022-03-23 NOTE — TELEPHONE ENCOUNTER
Marita Robles, I sent refill of Spiriva and new prescription for Zygmunt Her. This combination of two inhalers would be same as Trelegy. Instructions:  -Stop Asmanex. Can keep on shelf at home.  -Start Wixela 1 puff twice daily.  -Continue Spiriva 1 cap daily.

## 2022-03-23 NOTE — TELEPHONE ENCOUNTER
Spoke with pharmacist. She states Spiriva and Josue Im are going through, but Josue Im needs to be ordered for patient since they don't have it in stock. Pharmacist will notify patient.

## 2022-03-23 NOTE — TELEPHONE ENCOUNTER
Dr. Bhavani Gallagher - seeking clarification that refill is not appropriate, correct?      Per Pulmonary visit 3/15/22, Plan indicated:   \"-Change Asmanex and Spiriva to Trelegy\"

## 2022-03-23 NOTE — TELEPHONE ENCOUNTER
Sahara Quach was denied by insurance. Preferred alternatives are: Ania Puctomi, Dulera, and Symbicort. It looks like patient is on Spiriva. Per TE 3/15/22, Ghada GUERRERO was approved but spoke with pharmacy who says it is not going through still. Spoke with Maulik Fallon at [facility]. He says Trelegy was denied. Preferred: Spiriva 18mcg handheld, Akua Francisco, Symbicort    Of note, patient will change to [facility] on April 1: [phone].

## 2022-03-24 ENCOUNTER — HOSPITAL ENCOUNTER (OUTPATIENT)
Dept: RESPIRATORY THERAPY | Facility: HOSPITAL | Age: 52
Discharge: HOME | End: 2022-03-24
Attending: PHYSICIAN ASSISTANT
Payer: MEDICAID

## 2022-03-24 DIAGNOSIS — J44.9 CHRONIC OBSTRUCTIVE PULMONARY DISEASE, UNSPECIFIED COPD TYPE (HCC): ICD-10-CM

## 2022-03-24 PROCEDURE — 94726 PLETHYSMOGRAPHY LUNG VOLUMES: CPT | Performed by: INTERNAL MEDICINE

## 2022-03-24 PROCEDURE — 94060 EVALUATION OF WHEEZING: CPT | Performed by: INTERNAL MEDICINE

## 2022-03-24 PROCEDURE — 94729 DIFFUSING CAPACITY: CPT | Performed by: INTERNAL MEDICINE

## 2022-03-24 RX ORDER — TIOTROPIUM BROMIDE 18 UG/1
CAPSULE ORAL; RESPIRATORY (INHALATION)
Qty: 30 CAPSULE | Refills: 0 | Status: SHIPPED | OUTPATIENT
Start: 2022-03-24

## 2022-03-25 ENCOUNTER — TELEPHONE (OUTPATIENT)
Dept: PULMONOLOGY | Facility: CLINIC | Age: 52
End: 2022-03-25

## 2022-03-25 NOTE — TELEPHONE ENCOUNTER
Received Prior Authorization form for Nicotine Patch. Filled out information needed. Faxed to [phone]. Received fax confirmation. Sent out for scanning.

## 2022-03-29 ENCOUNTER — PATIENT MESSAGE (OUTPATIENT)
Dept: PULMONOLOGY | Facility: CLINIC | Age: 52
End: 2022-03-29

## 2022-03-29 NOTE — PROCEDURES
[facility]    PFT Interpretation    [address]     1970 MRN [id number]   Height  79 inh  Age 45year old   Weight  258 lbs  Sex Male         Spirometry:   FEV1 0.91 L which is 26%  FEV1/FVC ratio 44%    Significant bronchodilator response by improving FEV1 25% and greater than 200 cc      FVL:   Decreased expiratory flow in the mid and low volume area indicating severe obstructive pattern      Lung Volume:   TLC 5.94 L which is 94%  VC 2.40 L which is 55%  RV/TLC ratio 60% which is increased indicating moderate air trapping      DLCO:   59%      Impression:   Very severe COPD        Thank you for allowing me to participate in the care of your patient. Muriel Shone.  Niru Hays MD  3/29/2022  1:51 PM

## 2022-03-29 NOTE — ADDENDUM NOTE
Encounter addended by: Akin Mojica MD on: 3/29/2022 1:53 PM   Actions taken: Clinical Note Signed, Charge Capture section accepted

## 2022-03-31 ENCOUNTER — PATIENT MESSAGE (OUTPATIENT)
Dept: GASTROENTEROLOGY | Facility: CLINIC | Age: 52
End: 2022-03-31

## 2022-03-31 ENCOUNTER — HOSPITAL ENCOUNTER (OUTPATIENT)
Dept: ULTRASOUND IMAGING | Age: 52
Discharge: HOME | End: 2022-03-31
Attending: INTERNAL MEDICINE
Payer: MEDICAID

## 2022-03-31 DIAGNOSIS — K76.0 FATTY LIVER: ICD-10-CM

## 2022-03-31 PROCEDURE — 76705 ECHO EXAM OF ABDOMEN: CPT | Performed by: INTERNAL MEDICINE

## 2022-03-31 PROCEDURE — 76981 USE PARENCHYMA: CPT | Performed by: INTERNAL MEDICINE

## 2022-04-01 NOTE — TELEPHONE ENCOUNTER
From: Marvel Rutherford  To: Jimy Trinidad MD  Sent: 3/31/2022 1:38 PM CDT  Subject: Question regarding US LIVER WITH ELASTOGRAPHY(CPT=76705,46172)    So what does this mean? And what can I do?

## 2022-04-01 NOTE — TELEPHONE ENCOUNTER
I spoke to the patient. We discussed that the elastography reveals a fatty liver and F3 fibrosis. I discussed the significance. I have recommended weight loss and elimination/avoidance of alcohol. The patient has found any activity difficult as he has intractable and incapacitating vertigo. He will do his best.  I am recommending [address] surveillance. An ultrasound should be performed in 6 months. I would also check an alpha-fetoprotein and next blood draw. He is scheduled for a colonoscopy with Dr. Umberto Campos April 11. GI RNs: Please enter liver liver ultrasound recall for 6 months.

## 2022-04-02 ENCOUNTER — TELEPHONE (OUTPATIENT)
Dept: GASTROENTEROLOGY | Facility: CLINIC | Age: 52
End: 2022-04-02

## 2022-04-02 NOTE — TELEPHONE ENCOUNTER
US Liver recall in 6 months per Dr. Wahl Double entered in Pt Outreach, due approx 9/2022. Also, recall for labs and AFP.

## 2022-04-06 RX ORDER — LISINOPRIL 30 MG/1
30 TABLET ORAL DAILY
Qty: 90 TABLET | Refills: 1 | Status: CANCELLED | OUTPATIENT
Start: 2022-04-06

## 2022-04-06 RX ORDER — ALPRAZOLAM 1 MG/1
1 TABLET ORAL NIGHTLY PRN
Qty: 30 TABLET | Refills: 0 | OUTPATIENT
Start: 2022-04-06

## 2022-04-06 RX ORDER — ESCITALOPRAM OXALATE 20 MG/1
20 TABLET ORAL DAILY
Qty: 90 TABLET | Refills: 0 | Status: CANCELLED | OUTPATIENT
Start: 2022-04-06

## 2022-04-11 ENCOUNTER — ANESTHESIA (OUTPATIENT)
Dept: ENDOSCOPY | Facility: HOSPITAL | Age: 52
End: 2022-04-11
Payer: MEDICAID

## 2022-04-11 ENCOUNTER — HOSPITAL ENCOUNTER (OUTPATIENT)
Facility: HOSPITAL | Age: 52
Setting detail: HOSPITAL OUTPATIENT SURGERY
Discharge: HOME | End: 2022-04-11
Attending: INTERNAL MEDICINE | Admitting: INTERNAL MEDICINE
Payer: MEDICAID

## 2022-04-11 ENCOUNTER — ANESTHESIA EVENT (OUTPATIENT)
Dept: ENDOSCOPY | Facility: HOSPITAL | Age: 52
End: 2022-04-11
Payer: MEDICAID

## 2022-04-11 VITALS
HEIGHT: 67 IN | SYSTOLIC BLOOD PRESSURE: 116 MMHG | TEMPERATURE: 99 F | HEART RATE: 60 BPM | OXYGEN SATURATION: 97 % | DIASTOLIC BLOOD PRESSURE: 90 MMHG | WEIGHT: 258 LBS | BODY MASS INDEX: 40.49 KG/M2 | RESPIRATION RATE: 18 BRPM

## 2022-04-11 DIAGNOSIS — R19.7 DIARRHEA, UNSPECIFIED TYPE: ICD-10-CM

## 2022-04-11 PROCEDURE — 0DBK8ZX EXCISION OF ASCENDING COLON, VIA NATURAL OR ARTIFICIAL OPENING ENDOSCOPIC, DIAGNOSTIC: ICD-10-PCS | Performed by: INTERNAL MEDICINE

## 2022-04-11 PROCEDURE — 88305 TISSUE EXAM BY PATHOLOGIST: CPT | Performed by: INTERNAL MEDICINE

## 2022-04-11 PROCEDURE — 0DBM8ZX EXCISION OF DESCENDING COLON, VIA NATURAL OR ARTIFICIAL OPENING ENDOSCOPIC, DIAGNOSTIC: ICD-10-PCS | Performed by: INTERNAL MEDICINE

## 2022-04-11 RX ORDER — SODIUM CHLORIDE, SODIUM LACTATE, POTASSIUM CHLORIDE, CALCIUM CHLORIDE 600; 310; 30; 20 MG/100ML; MG/100ML; MG/100ML; MG/100ML
INJECTION, SOLUTION INTRAVENOUS CONTINUOUS
Status: DISCONTINUED | OUTPATIENT
Start: 2022-04-11 | End: 2022-04-11

## 2022-04-11 RX ADMIN — SODIUM CHLORIDE, SODIUM LACTATE, POTASSIUM CHLORIDE, CALCIUM CHLORIDE: 600; 310; 30; 20 INJECTION, SOLUTION INTRAVENOUS at 13:02:00

## 2022-04-12 RX ORDER — ESCITALOPRAM OXALATE 20 MG/1
20 TABLET ORAL DAILY
Qty: 90 TABLET | Refills: 1 | Status: SHIPPED | OUTPATIENT
Start: 2022-04-12

## 2022-04-12 NOTE — TELEPHONE ENCOUNTER
Refill passed per [place] [facility] protocol. Requested Prescriptions   Pending Prescriptions Disp Refills    escitalopram 20 MG Oral Tab 90 tablet 1     Sig: Take 1 tablet (20 mg total) by mouth daily. Psychiatric Non-Scheduled (Anti-Anxiety) Passed - 4/12/2022  4:50 PM        Passed - Appointment in last 6 or next 3 months           ALPRAZolam 1 MG Oral Tab 30 tablet 0     Sig: Take 1 tablet (1 mg total) by mouth nightly as needed for Sleep.         There is no refill protocol information for this order            Recent Outpatient Visits              3 weeks ago [address], Tyson Rudolph MD    Office Visit    4 weeks ago Chronic obstructive pulmonary disease, unspecified COPD type [facility])    [place], [facility], [place], Rem[place]u, [employer]    Office Visit    1 month ago Heart disease    [place] [facility] [place], [facility], [facility] CecilTyson MD    Office Visit    2 months ago ROBB (nonalcoholic steatohepatitis)    [facility], [facility], [facility], Viri Eastman APRN    Office Visit    9 months ago Essential hypertension, benign    [facility] Nichole Benítez MD    Office Visit            Future Appointments         Provider Department Appt Notes    In 2 months [facility] [place], [facility] 3 months

## 2022-04-12 NOTE — TELEPHONE ENCOUNTER
Pharmacy is requesting a refill for escitalopram 20 MG Oral Tab and ALPRAZolam 1 MG Oral Tab.  Please send refills to [address] [place] [address] @ [address] Roopa Mcleod

## 2022-04-12 NOTE — TELEPHONE ENCOUNTER
Please review. Protocol failed or has no protocol. Requested Prescriptions   Pending Prescriptions Disp Refills    ALPRAZolam 1 MG Oral Tab 30 tablet 0     Sig: Take 1 tablet (1 mg total) by mouth nightly as needed for Sleep. There is no refill protocol information for this order       Signed Prescriptions Disp Refills    escitalopram 20 MG Oral Tab 90 tablet 1     Sig: Take 1 tablet (20 mg total) by mouth daily.         Psychiatric Non-Scheduled (Anti-Anxiety) Passed - 4/12/2022  4:50 PM        Passed - Appointment in last 6 or next 3 months              Recent Outpatient Visits              3 weeks ago [address], Sudhakar High MD    Office Visit    4 weeks ago Chronic obstructive pulmonary disease, unspecified COPD type [facility])    [place], [address] [place] [address], [place], Rem[place]u, [employer]    Office Visit    1 month ago Heart disease    [place] [facility], [facility], [address] [place], [place] [address]Lorelei MD    Office Visit    2 months ago ROBB (nonalcoholic steatohepatitis)    [place] [facility], [facility], [address] [place] [address], Shelbie Eastman APRN    Office Visit    9 months ago Essential hypertension, benign    [facility] Gavin Horowitz MD    Office Visit            Future Appointments         Provider Department Appt Notes    In 2 months [facility] [place] [address] 3 months

## 2022-04-13 RX ORDER — ALPRAZOLAM 1 MG/1
1 TABLET ORAL NIGHTLY PRN
Qty: 30 TABLET | Refills: 0 | Status: SHIPPED | OUTPATIENT
Start: 2022-04-13

## 2022-04-14 ENCOUNTER — TELEPHONE (OUTPATIENT)
Dept: PULMONOLOGY | Facility: CLINIC | Age: 52
End: 2022-04-14

## 2022-04-14 ENCOUNTER — TELEPHONE (OUTPATIENT)
Dept: GASTROENTEROLOGY | Facility: CLINIC | Age: 52
End: 2022-04-14

## 2022-04-14 RX ORDER — BUDESONIDE 3 MG/1
CAPSULE, COATED PELLETS ORAL
Qty: 130 CAPSULE | Refills: 0 | Status: SHIPPED | OUTPATIENT
Start: 2022-04-14

## 2022-04-14 NOTE — TELEPHONE ENCOUNTER
Patient is overdue for CT lung low dose screening as ordered by Patricia Hobson. Letter sent to patient.

## 2022-04-14 NOTE — TELEPHONE ENCOUNTER
Called patient and discussed path with him    Discussed polyps were benign. Discussed path suggesting microscopic colitis. Discussed this condition. He has no associated medications when I discussed with him including NSAIDs or new medications in the last 6 months associated with timing of symptoms. He notes trying loperamide which hasn't helped and also bismuth subsalicylate. Discussed then next appropriate/recommended step is budesonide. Discussed the safty profile and tapering dose, risk of recurrence, and not typically recommended for long term use. Also discussed he should call and make a follow up with claude in approximately 6 weeks, which he says he will do. GI staff:    Please recall for colonoscopy in 10 years. Also let me know if prior auth letter/etc needed for budesonide.     Thanks    Manda Amador MD  [facility], [facility] - Gastroenterology  4/14/2022  5:26 PM
Health maintenance updated. Last colonoscopy done 4/11/22. 10 year recall placed into Pt Outreach, next due on 4/11/32 per Dr. Feroz Lantigua.
not affiliated

## 2022-04-15 ENCOUNTER — TELEPHONE (OUTPATIENT)
Dept: GASTROENTEROLOGY | Facility: CLINIC | Age: 52
End: 2022-04-15

## 2022-04-15 NOTE — TELEPHONE ENCOUNTER
Spoke to pharmacist Patrisha Favre at [facility]. States PA did go through but they will need to ship the medication to patient on Monday.

## 2022-04-15 NOTE — TELEPHONE ENCOUNTER
Fax received from Ruben Francois requesting additional information. Clinicals/office/path notes faxed to [employer]. Await determination.

## 2022-04-15 NOTE — TELEPHONE ENCOUNTER
Approval received from [facility] [address] [place] [address],[address] for Budesonide 3mg. Valid 4/1/22 - 4/15/23. Medication was released to [facility].

## 2022-04-19 NOTE — TELEPHONE ENCOUNTER
GI staff:    Just sent/prescribed this 5 days ago. Please find out why a request for refill is being submitted.  Not appropriate at this time    Thanks    Zaria Davidson MD  [facility], [facility] - Gastroenterology  4/19/2022  9:39 AM

## 2022-04-20 RX ORDER — BUDESONIDE 3 MG/1
CAPSULE, COATED PELLETS ORAL
Qty: 130 CAPSULE | Refills: 0 | OUTPATIENT
Start: 2022-04-20

## 2022-04-20 NOTE — TELEPHONE ENCOUNTER
prescription was received by the pharmacy on 9/65/2683    Was a duplicate request     Dr Margot Stanford refilled on 4/14/2022

## 2022-04-22 ENCOUNTER — HOSPITAL ENCOUNTER (OUTPATIENT)
Dept: CV DIAGNOSTICS | Facility: HOSPITAL | Age: 52
Discharge: HOME | End: 2022-04-22
Attending: INTERNAL MEDICINE
Payer: MEDICAID

## 2022-04-22 DIAGNOSIS — E78.1 HYPERTRIGLYCERIDEMIA: ICD-10-CM

## 2022-04-22 DIAGNOSIS — G47.33 OSA (OBSTRUCTIVE SLEEP APNEA): ICD-10-CM

## 2022-04-22 DIAGNOSIS — I10 HTN (HYPERTENSION): ICD-10-CM

## 2022-04-22 DIAGNOSIS — I25.10 CAD (CORONARY ARTERY DISEASE): ICD-10-CM

## 2022-04-22 PROCEDURE — 93306 TTE W/DOPPLER COMPLETE: CPT | Performed by: INTERNAL MEDICINE

## 2022-05-05 RX ORDER — TIOTROPIUM BROMIDE 18 UG/1
CAPSULE ORAL; RESPIRATORY (INHALATION)
Qty: 30 CAPSULE | Refills: 0 | Status: SHIPPED | OUTPATIENT
Start: 2022-05-05

## 2022-05-13 RX ORDER — METOPROLOL TARTRATE 50 MG/1
50 TABLET, FILM COATED ORAL 2 TIMES DAILY
Qty: 180 TABLET | Refills: 1 | Status: SHIPPED | OUTPATIENT
Start: 2022-05-13 | End: 2022-11-19

## 2022-05-13 NOTE — TELEPHONE ENCOUNTER
Refill passed per [address] [place] protocol.   Requested Prescriptions   Pending Prescriptions Disp Refills    METOPROLOL TARTRATE 50 MG Oral Tab [Pharmacy Med Name: METOPROLOL TARTRATE 50MG TABLETS] 180 tablet 1     Sig: TAKE 1 TABLET BY MOUTH TWICE DAILY        Hypertensive Medications Protocol Passed - 5/13/2022 10:04 AM        Passed - CMP or BMP in past 12 months        Passed - Appointment in past 6 or next 3 months        Passed - GFR Non- > 50     Lab Results   Component Value Date    GFRNAA 95 03/08/2022                     Future Appointments         Provider Department Appt Notes    In 1 month [facility] [place] [address] 3 months    In 1 month LYNNE Cardenas [address] rx follow up          Recent Outpatient Visits              1 month ago [facility], [facility], Jamal Wise MD    Office Visit    1 month ago Chronic obstructive pulmonary disease, unspecified COPD type [facility])    [place], [facility]    Office Visit    2 months ago Heart disease    [address] [place], [facility] Lorelei lopez MD    Office Visit    3 months ago ROBB (nonalcoholic steatohepatitis)    [address] [place], [facility] [place] Janessa Eastman APRN    Office Visit    10 months ago Essential hypertension, [facility]TY [facility] Ashley Anderson MD    Office Visit

## 2022-05-16 RX ORDER — TIOTROPIUM BROMIDE 18 UG/1
CAPSULE ORAL; RESPIRATORY (INHALATION)
Qty: 30 CAPSULE | Refills: 3 | Status: SHIPPED | OUTPATIENT
Start: 2022-05-16

## 2022-05-16 RX ORDER — FLUTICASONE PROPIONATE AND SALMETEROL 250; 50 UG/1; UG/1
POWDER RESPIRATORY (INHALATION)
Qty: 60 EACH | Refills: 3 | Status: SHIPPED | OUTPATIENT
Start: 2022-05-16

## 2022-05-16 NOTE — TELEPHONE ENCOUNTER
LOV: 3/15/2022  Last refill: 3/23/2022    Nel Cope PA-C-please review and sign pended prescription if agreebale.

## 2022-05-19 ENCOUNTER — TELEPHONE (OUTPATIENT)
Dept: PULMONOLOGY | Facility: CLINIC | Age: 52
End: 2022-05-19

## 2022-05-20 NOTE — TELEPHONE ENCOUNTER
Kita Guerrero from [address] states that after talking to the pt he will need a PA for his next script

## 2022-05-20 NOTE — TELEPHONE ENCOUNTER
Spoke to representative to complete PA for [facility]   ph [phone]. PA complete-will await decision. Patient informed.

## 2022-05-20 NOTE — TELEPHONE ENCOUNTER
Spoke to pharmacy staff-No prior Auth required. Patient picked up on [address] [place] [address] on 5/12/22 and can pick-up again on 6/4/22.

## 2022-05-23 ENCOUNTER — TELEPHONE (OUTPATIENT)
Dept: GASTROENTEROLOGY | Facility: CLINIC | Age: 52
End: 2022-05-23

## 2022-05-23 NOTE — TELEPHONE ENCOUNTER
1st reminder letter mailed to patient regarding his overdue labs order;   AFP, TUMOR MARKER, SERUM   HEPATIC FUNCTION PANEL (7)

## 2022-05-27 ENCOUNTER — PATIENT MESSAGE (OUTPATIENT)
Dept: GASTROENTEROLOGY | Facility: CLINIC | Age: 52
End: 2022-05-27

## 2022-05-27 ENCOUNTER — LAB ENCOUNTER (OUTPATIENT)
Dept: LAB | Age: 52
End: 2022-05-27
Attending: INTERNAL MEDICINE
Payer: MEDICAID

## 2022-05-27 DIAGNOSIS — R79.89 ABNORMAL LIVER FUNCTION TEST: ICD-10-CM

## 2022-05-27 DIAGNOSIS — K75.81 NASH (NONALCOHOLIC STEATOHEPATITIS): ICD-10-CM

## 2022-05-27 DIAGNOSIS — K75.81 NASH (NONALCOHOLIC STEATOHEPATITIS): Primary | ICD-10-CM

## 2022-05-27 LAB
AFP-TM SERPL-MCNC: 2.5 NG/ML (ref ?–8)
ALBUMIN SERPL-MCNC: 3.6 G/DL (ref 3.4–5)
ALP LIVER SERPL-CCNC: 112 U/L
ALT SERPL-CCNC: 244 U/L
AST SERPL-CCNC: 85 U/L (ref 15–37)
BILIRUB DIRECT SERPL-MCNC: 0.2 MG/DL (ref 0–0.2)
BILIRUB SERPL-MCNC: 0.6 MG/DL (ref 0.1–2)
PROT SERPL-MCNC: 7.2 G/DL (ref 6.4–8.2)

## 2022-05-27 PROCEDURE — 82105 ALPHA-FETOPROTEIN SERUM: CPT

## 2022-05-27 PROCEDURE — 80076 HEPATIC FUNCTION PANEL: CPT

## 2022-05-27 PROCEDURE — 36415 COLL VENOUS BLD VENIPUNCTURE: CPT

## 2022-05-30 ENCOUNTER — PATIENT MESSAGE (OUTPATIENT)
Dept: GASTROENTEROLOGY | Facility: CLINIC | Age: 52
End: 2022-05-30

## 2022-05-31 NOTE — TELEPHONE ENCOUNTER
From: Dayton Finn  To: Treva Sinha  Sent: 5/30/2022 4:29 PM CDT  Subject: BUDESONIDE     This is causing me some acne on back and chest,and am down to 1 a day for 2-3 weeks. What can I do?

## 2022-05-31 NOTE — TELEPHONE ENCOUNTER
Tala Cerda--    Patient inquiring if budesonide causes acne. Currently taking taking x 1 3 mg daily.      Thank you

## 2022-06-01 ENCOUNTER — TELEPHONE (OUTPATIENT)
Dept: PULMONOLOGY | Facility: CLINIC | Age: 52
End: 2022-06-01

## 2022-06-01 RX ORDER — ALBUTEROL SULFATE 90 UG/1
AEROSOL, METERED RESPIRATORY (INHALATION)
Qty: 8.5 G | Refills: 0 | Status: SHIPPED | OUTPATIENT
Start: 2022-06-01

## 2022-06-01 NOTE — TELEPHONE ENCOUNTER
LOV: 3/15/22  NOV: 6/21/22  Last refill: 3/15/22    Hannah Rein -please review and sign pended order if agreeable.

## 2022-06-02 ENCOUNTER — TELEPHONE (OUTPATIENT)
Dept: PULMONOLOGY | Facility: CLINIC | Age: 52
End: 2022-06-02

## 2022-06-02 NOTE — TELEPHONE ENCOUNTER
From: Robin Valles  To: Elsy Mckeon MD  Sent: 5/27/2022 3:03 PM CDT  Subject: Question regarding HEPATIC FUNCTION PANEL (7)    Does taking BUDESONIDE 3MG have anything to do with this. Because they think I have micro colits!

## 2022-06-02 NOTE — TELEPHONE ENCOUNTER
From: Rosi Mancuso  To: Jayy hSarif MD  Sent: 5/27/2022 2:54 PM CDT  Subject: Question regarding HEPATIC FUNCTION PANEL (7)    Does this mean I have liver damage or heart issues or niether?

## 2022-06-02 NOTE — TELEPHONE ENCOUNTER
From: Merlin Ross  To: Valentine Luna MD  Sent: 5/27/2022 2:12 PM CDT  Subject: Question regarding AFP, TUMOR MARKER, SERUM    So what does this mean? I have no idea?  Thanks

## 2022-06-03 NOTE — TELEPHONE ENCOUNTER
Spoke with patient. Patient inquiring if he did CT scan or not. Informed patient CT Lung LD screening was not completed and provided number for central scheduling [phone]. Patient verbalized understanding.

## 2022-06-15 ENCOUNTER — HOSPITAL ENCOUNTER (OUTPATIENT)
Dept: CT IMAGING | Facility: HOSPITAL | Age: 52
Discharge: HOME | End: 2022-06-15
Attending: PHYSICIAN ASSISTANT
Payer: MEDICAID

## 2022-06-15 DIAGNOSIS — Z87.891 PERSONAL HISTORY OF TOBACCO USE, PRESENTING HAZARDS TO HEALTH: ICD-10-CM

## 2022-06-15 PROCEDURE — 71271 CT THORAX LUNG CANCER SCR C-: CPT | Performed by: PHYSICIAN ASSISTANT

## 2022-06-16 DIAGNOSIS — R91.1 PULMONARY NODULE: Primary | ICD-10-CM

## 2022-06-21 ENCOUNTER — OFFICE VISIT (OUTPATIENT)
Dept: GASTROENTEROLOGY | Facility: CLINIC | Age: 52
End: 2022-06-21
Payer: MEDICAID

## 2022-06-21 ENCOUNTER — OFFICE VISIT (OUTPATIENT)
Dept: PULMONOLOGY | Facility: CLINIC | Age: 52
End: 2022-06-21
Payer: MEDICAID

## 2022-06-21 VITALS
SYSTOLIC BLOOD PRESSURE: 150 MMHG | WEIGHT: 273 LBS | DIASTOLIC BLOOD PRESSURE: 74 MMHG | HEIGHT: 67 IN | BODY MASS INDEX: 42.85 KG/M2 | OXYGEN SATURATION: 95 % | RESPIRATION RATE: 18 BRPM | HEART RATE: 67 BPM

## 2022-06-21 VITALS
HEIGHT: 67 IN | BODY MASS INDEX: 42.85 KG/M2 | DIASTOLIC BLOOD PRESSURE: 74 MMHG | WEIGHT: 273 LBS | HEART RATE: 67 BPM | SYSTOLIC BLOOD PRESSURE: 150 MMHG

## 2022-06-21 DIAGNOSIS — Z12.11 COLON CANCER SCREENING: Primary | ICD-10-CM

## 2022-06-21 DIAGNOSIS — Z72.0 TOBACCO ABUSE: ICD-10-CM

## 2022-06-21 DIAGNOSIS — R91.1 PULMONARY NODULE: ICD-10-CM

## 2022-06-21 DIAGNOSIS — J44.9 CHRONIC OBSTRUCTIVE PULMONARY DISEASE, UNSPECIFIED COPD TYPE (HCC): ICD-10-CM

## 2022-06-21 DIAGNOSIS — K76.0 HEPATIC STEATOSIS: ICD-10-CM

## 2022-06-21 DIAGNOSIS — G47.33 OSA ON CPAP: Primary | ICD-10-CM

## 2022-06-21 DIAGNOSIS — K52.839 MICROSCOPIC COLITIS, UNSPECIFIED MICROSCOPIC COLITIS TYPE: ICD-10-CM

## 2022-06-21 DIAGNOSIS — Z99.89 OSA ON CPAP: Primary | ICD-10-CM

## 2022-06-21 PROCEDURE — 99215 OFFICE O/P EST HI 40 MIN: CPT | Performed by: NURSE PRACTITIONER

## 2022-06-21 PROCEDURE — 3077F SYST BP >= 140 MM HG: CPT | Performed by: PHYSICIAN ASSISTANT

## 2022-06-21 PROCEDURE — 3078F DIAST BP <80 MM HG: CPT | Performed by: PHYSICIAN ASSISTANT

## 2022-06-21 PROCEDURE — 3008F BODY MASS INDEX DOCD: CPT | Performed by: NURSE PRACTITIONER

## 2022-06-21 PROCEDURE — 3078F DIAST BP <80 MM HG: CPT | Performed by: NURSE PRACTITIONER

## 2022-06-21 PROCEDURE — 3008F BODY MASS INDEX DOCD: CPT | Performed by: PHYSICIAN ASSISTANT

## 2022-06-21 PROCEDURE — 3077F SYST BP >= 140 MM HG: CPT | Performed by: NURSE PRACTITIONER

## 2022-06-21 PROCEDURE — 99214 OFFICE O/P EST MOD 30 MIN: CPT | Performed by: PHYSICIAN ASSISTANT

## 2022-06-21 RX ORDER — FLUTICASONE PROPIONATE AND SALMETEROL 500; 50 UG/1; UG/1
1 POWDER RESPIRATORY (INHALATION) 2 TIMES DAILY
Qty: 60 EACH | Refills: 0 | Status: SHIPPED | OUTPATIENT
Start: 2022-06-21 | End: 2022-07-21

## 2022-06-21 NOTE — PATIENT INSTRUCTIONS
-complete budesonide taper  -avoid tobacco  -avoid nsaids  -avoid dairy,wheat  -If no improvement in symptoms would consider addition of cholestyramine or pepto-bismol  -hcc surveillance (ultrasound, labs every 6 mos)  -weight loss  -limit/avoid alcohol  -cscope 2032 unless otherwise indicated

## 2022-06-25 RX ORDER — ESCITALOPRAM OXALATE 20 MG/1
TABLET ORAL
Qty: 90 TABLET | Refills: 1 | Status: SHIPPED | OUTPATIENT
Start: 2022-06-25

## 2022-06-30 RX ORDER — ALBUTEROL SULFATE 90 UG/1
AEROSOL, METERED RESPIRATORY (INHALATION)
Qty: 8.5 G | Refills: 0 | Status: SHIPPED | OUTPATIENT
Start: 2022-06-30

## 2022-06-30 NOTE — TELEPHONE ENCOUNTER
LOV: 6/21/22  RTC: 6 months  NOV: 12/20/22  Last refill: 6/1/22 (0 refills)    Linh Parada -please review and sign pended order if agreeable.

## 2022-07-16 ENCOUNTER — PATIENT MESSAGE (OUTPATIENT)
Dept: GASTROENTEROLOGY | Facility: CLINIC | Age: 52
End: 2022-07-16

## 2022-07-18 RX ORDER — BUDESONIDE 3 MG/1
CAPSULE, COATED PELLETS ORAL
Qty: 30 CAPSULE | Refills: 0 | Status: SHIPPED | OUTPATIENT
Start: 2022-07-18

## 2022-07-18 NOTE — TELEPHONE ENCOUNTER
From: Robert Yin  To: Aditya Crews. LIANA Sanchez  Sent: 7/16/2022 11:53 PM CDT  Subject: BUDESONIDE     Dr Luan Vann, this is Tie-Dye ethan. Steffany Boss a week after I saw you I was fine 2nd week not so good.  Think I need the BUDESONIDE low dose please send to Chantel at THE [facility]! Thank you and have a Great day!!!

## 2022-07-18 NOTE — TELEPHONE ENCOUNTER
Brittanie Ill seen in clinic 06/21/2022. Was on budesonide 3 mg/daily with 4-days left of therapy at time of visit. Recommended considering extending treatment if no improvements in symptoms. Patient requesting additional rx due to not feeling well again. Please advise if budesonide extension appropriate and provide appropriate rx orders.      Thank you

## 2022-07-19 DIAGNOSIS — R19.7 DIARRHEA, UNSPECIFIED TYPE: Primary | ICD-10-CM

## 2022-07-22 ENCOUNTER — TELEPHONE (OUTPATIENT)
Dept: PULMONOLOGY | Facility: CLINIC | Age: 52
End: 2022-07-22

## 2022-07-25 RX ORDER — ALBUTEROL SULFATE 90 UG/1
AEROSOL, METERED RESPIRATORY (INHALATION)
Qty: 8.5 G | Refills: 5 | Status: SHIPPED | OUTPATIENT
Start: 2022-07-25

## 2022-07-25 RX ORDER — FLUTICASONE PROPIONATE AND SALMETEROL 500; 50 UG/1; UG/1
1 POWDER RESPIRATORY (INHALATION) 2 TIMES DAILY
Qty: 60 EACH | Refills: 3 | Status: SHIPPED | OUTPATIENT
Start: 2022-07-25 | End: 2022-08-24

## 2022-07-25 NOTE — TELEPHONE ENCOUNTER
Spoke to [place] at [facility] to attempt to complete PA for [employer] at [phone]. Then transferred to   [employer] [phone] and disconnected again. Disconnected x 4 when trying to complete PA. Will try again at a later time.

## 2022-07-25 NOTE — TELEPHONE ENCOUNTER
LOV: 6/21/2022  Last refill: 6/30/2022    Janiece Shone PA-C-please review and sign pended prescription if agreeable.

## 2022-07-25 NOTE — TELEPHONE ENCOUNTER
Message left for Tereso Lombard [phone] at [facility]. Jt BEJARANO-please review and sign pended prescription for [facility] for patient if agreeable. PA will be activated once prescription is signed.

## 2022-07-27 ENCOUNTER — TELEPHONE (OUTPATIENT)
Dept: PULMONOLOGY | Facility: CLINIC | Age: 52
End: 2022-07-27

## 2022-07-27 NOTE — TELEPHONE ENCOUNTER
Notified Shelley Agarwal @ [facility] that pt filed an appeal himself, will have case hearing to overturn denial, & nurse @ our office has completed PA, so we will have to await outcome. She voiced understanding.

## 2022-07-27 NOTE — TELEPHONE ENCOUNTER
Chantel requesting RN to obtain prior auth from [facility]. Please call. Thank you.

## 2022-07-28 ENCOUNTER — LAB ENCOUNTER (OUTPATIENT)
Dept: LAB | Age: 52
End: 2022-07-28
Attending: NURSE PRACTITIONER
Payer: MEDICAID

## 2022-07-28 DIAGNOSIS — R19.7 DIARRHEA, UNSPECIFIED TYPE: ICD-10-CM

## 2022-07-28 PROCEDURE — 87493 C DIFF AMPLIFIED PROBE: CPT

## 2022-07-28 NOTE — TELEPHONE ENCOUNTER
Received PA denial from [employer] via fax. See below and TE 7/22/2022, pt is going to appeal due to trying and failing a formulary alternative. Denial sent to scanning.

## 2022-07-29 LAB — C DIFF TOX B STL QL: NEGATIVE

## 2022-08-02 ENCOUNTER — TELEPHONE (OUTPATIENT)
Dept: GASTROENTEROLOGY | Facility: CLINIC | Age: 52
End: 2022-08-02

## 2022-08-02 NOTE — TELEPHONE ENCOUNTER
1st reminder overdue letter mailed and sent out to patient via my chart regarding labs and Imaging order:   CBC WITH DIFFERENTIAL WITH PLATELET   COMP METABOLIC PANEL (14)   US LIVER (CPT=76705)

## 2022-08-03 ENCOUNTER — TELEPHONE (OUTPATIENT)
Dept: FAMILY MEDICINE CLINIC | Facility: CLINIC | Age: 52
End: 2022-08-03

## 2022-08-03 NOTE — TELEPHONE ENCOUNTER
Patient Review of Clinical Information      Problems    The patient or proxy has not reviewed this information. Medications    The patient or proxy has not reviewed this information, and there are updates pending:     Requested Medication Additions Start Date Reported By  Comments    Repatha 140mg/ml pf auto injection  4/2/2022 Rboert Humphrey            Requested Medication Removals Start Date Reported By  Comments    atorvastatin 80 MG Tabs 10/7/2021 Robert Humphrey     Tiotropium Bromide Monohydrate 18 MCG Caps 3/23/2022 Robert Yin                  Allergies    The patient or proxy has not reviewed this information.               Medications have been removed

## 2022-08-04 ENCOUNTER — LAB ENCOUNTER (OUTPATIENT)
Dept: LAB | Age: 52
End: 2022-08-04
Attending: FAMILY MEDICINE
Payer: MEDICAID

## 2022-08-04 DIAGNOSIS — E78.1 HYPERTRIGLYCERIDEMIA: ICD-10-CM

## 2022-08-04 DIAGNOSIS — K75.81 NASH (NONALCOHOLIC STEATOHEPATITIS): ICD-10-CM

## 2022-08-04 LAB
ALBUMIN SERPL-MCNC: 3.9 G/DL (ref 3.4–5)
ALBUMIN/GLOB SERPL: 1.1 {RATIO} (ref 1–2)
ALP LIVER SERPL-CCNC: 110 U/L
ALT SERPL-CCNC: 273 U/L
ANION GAP SERPL CALC-SCNC: 6 MMOL/L (ref 0–18)
AST SERPL-CCNC: 111 U/L (ref 15–37)
BASOPHILS # BLD AUTO: 0.08 X10(3) UL (ref 0–0.2)
BASOPHILS NFR BLD AUTO: 0.9 %
BILIRUB SERPL-MCNC: 0.5 MG/DL (ref 0.1–2)
BUN BLD-MCNC: 19 MG/DL (ref 7–18)
BUN/CREAT SERPL: 18.4 (ref 10–20)
CALCIUM BLD-MCNC: 10.1 MG/DL (ref 8.5–10.1)
CHLORIDE SERPL-SCNC: 105 MMOL/L (ref 98–112)
CHOLEST SERPL-MCNC: 188 MG/DL (ref ?–200)
CO2 SERPL-SCNC: 29 MMOL/L (ref 21–32)
CREAT BLD-MCNC: 1.03 MG/DL
DEPRECATED RDW RBC AUTO: 46.7 FL (ref 35.1–46.3)
EOSINOPHIL # BLD AUTO: 0.31 X10(3) UL (ref 0–0.7)
EOSINOPHIL NFR BLD AUTO: 3.5 %
ERYTHROCYTE [DISTWIDTH] IN BLOOD BY AUTOMATED COUNT: 13.2 % (ref 11–15)
FASTING PATIENT LIPID ANSWER: YES
FASTING STATUS PATIENT QL REPORTED: YES
GFR SERPLBLD BASED ON 1.73 SQ M-ARVRAT: 86 ML/MIN/1.73M2 (ref 60–?)
GLOBULIN PLAS-MCNC: 3.5 G/DL (ref 2.8–4.4)
GLUCOSE BLD-MCNC: 123 MG/DL (ref 70–99)
HCT VFR BLD AUTO: 48.6 %
HDLC SERPL-MCNC: 50 MG/DL (ref 40–59)
HGB BLD-MCNC: 15.9 G/DL
IMM GRANULOCYTES # BLD AUTO: 0.02 X10(3) UL (ref 0–1)
IMM GRANULOCYTES NFR BLD: 0.2 %
LDLC SERPL CALC-MCNC: 98 MG/DL (ref ?–100)
LYMPHOCYTES # BLD AUTO: 1.92 X10(3) UL (ref 1–4)
LYMPHOCYTES NFR BLD AUTO: 21.6 %
MCH RBC QN AUTO: 31.5 PG (ref 26–34)
MCHC RBC AUTO-ENTMCNC: 32.7 G/DL (ref 31–37)
MCV RBC AUTO: 96.4 FL
MONOCYTES # BLD AUTO: 0.75 X10(3) UL (ref 0.1–1)
MONOCYTES NFR BLD AUTO: 8.4 %
NEUTROPHILS # BLD AUTO: 5.81 X10 (3) UL (ref 1.5–7.7)
NEUTROPHILS # BLD AUTO: 5.81 X10(3) UL (ref 1.5–7.7)
NEUTROPHILS NFR BLD AUTO: 65.4 %
NONHDLC SERPL-MCNC: 138 MG/DL (ref ?–130)
OSMOLALITY SERPL CALC.SUM OF ELEC: 294 MOSM/KG (ref 275–295)
PLATELET # BLD AUTO: 165 10(3)UL (ref 150–450)
POTASSIUM SERPL-SCNC: 4.4 MMOL/L (ref 3.5–5.1)
PROT SERPL-MCNC: 7.4 G/DL (ref 6.4–8.2)
RBC # BLD AUTO: 5.04 X10(6)UL
SODIUM SERPL-SCNC: 140 MMOL/L (ref 136–145)
TRIGL SERPL-MCNC: 236 MG/DL (ref 30–149)
VLDLC SERPL CALC-MCNC: 39 MG/DL (ref 0–30)
WBC # BLD AUTO: 8.9 X10(3) UL (ref 4–11)

## 2022-08-04 PROCEDURE — 85025 COMPLETE CBC W/AUTO DIFF WBC: CPT

## 2022-08-04 PROCEDURE — 80061 LIPID PANEL: CPT

## 2022-08-04 PROCEDURE — 80053 COMPREHEN METABOLIC PANEL: CPT

## 2022-08-04 PROCEDURE — 36415 COLL VENOUS BLD VENIPUNCTURE: CPT

## 2022-08-04 RX ORDER — BUDESONIDE AND FORMOTEROL FUMARATE DIHYDRATE 160; 4.5 UG/1; UG/1
AEROSOL RESPIRATORY (INHALATION)
Qty: 10.2 G | Refills: 5 | Status: SHIPPED | OUTPATIENT
Start: 2022-08-04 | End: 2022-08-04 | Stop reason: CLARIF

## 2022-08-08 ENCOUNTER — TELEPHONE (OUTPATIENT)
Dept: GASTROENTEROLOGY | Facility: CLINIC | Age: 52
End: 2022-08-08

## 2022-08-08 DIAGNOSIS — R74.8 ELEVATED LIVER ENZYMES: Primary | ICD-10-CM

## 2022-08-08 DIAGNOSIS — K75.81 NASH (NONALCOHOLIC STEATOHEPATITIS): ICD-10-CM

## 2022-08-09 NOTE — TELEPHONE ENCOUNTER
I left a message on the patient's personal voicemail that I returned the call. I will try the patient tomorrow.

## 2022-08-10 RX ORDER — BUDESONIDE 3 MG/1
CAPSULE, COATED PELLETS ORAL
Qty: 60 CAPSULE | Refills: 2 | Status: SHIPPED | OUTPATIENT
Start: 2022-08-10

## 2022-08-10 RX ORDER — ALPRAZOLAM 1 MG/1
TABLET ORAL
Qty: 30 TABLET | Refills: 0 | Status: SHIPPED | OUTPATIENT
Start: 2022-08-10

## 2022-08-10 NOTE — TELEPHONE ENCOUNTER
I spoke to Al. We discussed that his liver enzymes remain significantly elevated. Although I suspect that the cause is nonalcoholic steatohepatitis (ROBB) other causes should be excluded. I am recommending an ultrasound-guided liver biopsy. The rationale, nature and risks were discussed. Al also has microscopic colitis. His symptoms were better on 9 mg daily of budesonide, however, upon tapering his symptoms have flared and he is having #10 bowel movements daily with urgency. Recent C difficile toxin assay was negative. He is currently taking 3 mg daily. He will increase back to 6 mg daily and assess his symptoms. New prescription sent to the pharmacy. GI RNs: Please make sure that radiology contact the patient to schedule his liver biopsy. Carmen Llamas.

## 2022-08-16 ENCOUNTER — NURSE ONLY (OUTPATIENT)
Dept: LAB | Age: 52
End: 2022-08-16
Attending: RADIOLOGY
Payer: MEDICAID

## 2022-08-16 ENCOUNTER — LAB ENCOUNTER (OUTPATIENT)
Dept: LAB | Age: 52
End: 2022-08-16
Attending: INTERNAL MEDICINE
Payer: MEDICAID

## 2022-08-16 DIAGNOSIS — K75.81 NASH (NONALCOHOLIC STEATOHEPATITIS): ICD-10-CM

## 2022-08-16 DIAGNOSIS — Z01.818 PRE-OP TESTING: ICD-10-CM

## 2022-08-16 DIAGNOSIS — I25.10 ATHEROSCLEROSIS OF NATIVE CORONARY ARTERY OF NATIVE HEART WITHOUT ANGINA PECTORIS: ICD-10-CM

## 2022-08-16 DIAGNOSIS — E78.1 HYPERTRIGLYCERIDEMIA: ICD-10-CM

## 2022-08-16 DIAGNOSIS — R74.8 ELEVATED LIVER ENZYMES: ICD-10-CM

## 2022-08-16 LAB
ALT SERPL-CCNC: 241 U/L
AST SERPL-CCNC: 90 U/L (ref 15–37)
CHOLEST SERPL-MCNC: 240 MG/DL (ref ?–200)
FASTING PATIENT LIPID ANSWER: YES
HDLC SERPL-MCNC: 44 MG/DL (ref 40–59)
INR BLD: 0.98 (ref 0.85–1.16)
LDLC SERPL CALC-MCNC: 152 MG/DL (ref ?–100)
NONHDLC SERPL-MCNC: 196 MG/DL (ref ?–130)
PROTHROMBIN TIME: 12.9 SECONDS (ref 11.6–14.8)
SARS-COV-2 RNA RESP QL NAA+PROBE: NOT DETECTED
TRIGL SERPL-MCNC: 240 MG/DL (ref 30–149)
VLDLC SERPL CALC-MCNC: 46 MG/DL (ref 0–30)

## 2022-08-16 PROCEDURE — 85610 PROTHROMBIN TIME: CPT

## 2022-08-16 PROCEDURE — 84460 ALANINE AMINO (ALT) (SGPT): CPT

## 2022-08-16 PROCEDURE — 80061 LIPID PANEL: CPT

## 2022-08-16 PROCEDURE — 36415 COLL VENOUS BLD VENIPUNCTURE: CPT

## 2022-08-16 PROCEDURE — 84450 TRANSFERASE (AST) (SGOT): CPT

## 2022-08-17 ENCOUNTER — TELEPHONE (OUTPATIENT)
Dept: PULMONOLOGY | Facility: CLINIC | Age: 52
End: 2022-08-17

## 2022-08-17 RX ORDER — FLUTICASONE PROPIONATE AND SALMETEROL 500; 50 UG/1; UG/1
1 POWDER RESPIRATORY (INHALATION) 2 TIMES DAILY
Qty: 60 EACH | Refills: 0 | Status: SHIPPED | OUTPATIENT
Start: 2022-08-17 | End: 2022-09-16

## 2022-08-17 NOTE — TELEPHONE ENCOUNTER
Spoke to [employer] who states we will have submit another PA request w/ reasoning why pt tried & failed Symbicort. She agreed to fax our office PA form. Fax # given. She voiced understanding.

## 2022-08-17 NOTE — TELEPHONE ENCOUNTER
Patient indicates is throat is burning since taking rx symbicort, but helped with opening passway. Patient would like to go back rx welexa. Please call at [phone],SNPETK.

## 2022-08-17 NOTE — TELEPHONE ENCOUNTER
Per pt w/in the first 2 days of using Symbicort his throat has been irritated & burning (feels like sore throat, tested - for COVID, & does not have any other sx), but Symbicort does open his airways. Stts he has been using Symbicort x 2 wks, still using it b/c can't breathe otherwise, & he would like to go back to using Princess Salm which was previously prescribed. Aware RN will have to discuss w/ provider, new rx for Princess Salm 500-50 mcg inhaler will be required, per Radha @ [facility] we can call [employer] (p. #[phone]) to see what next step is once he has tried & failed Symbicort after 2 wks time (pls see TE 7/22/22 for Terrance Baldomero), & RN will f/u thereafter. Terrance Baldomero- do you want to order Princess Salm?

## 2022-08-19 ENCOUNTER — HOSPITAL ENCOUNTER (OUTPATIENT)
Dept: INTERVENTIONAL RADIOLOGY/VASCULAR | Facility: HOSPITAL | Age: 52
Discharge: HOME | End: 2022-08-19
Attending: INTERNAL MEDICINE | Admitting: RADIOLOGY
Payer: MEDICAID

## 2022-08-19 VITALS
OXYGEN SATURATION: 96 % | SYSTOLIC BLOOD PRESSURE: 158 MMHG | WEIGHT: 273 LBS | BODY MASS INDEX: 42.85 KG/M2 | TEMPERATURE: 97 F | HEART RATE: 57 BPM | RESPIRATION RATE: 18 BRPM | HEIGHT: 67 IN | DIASTOLIC BLOOD PRESSURE: 74 MMHG

## 2022-08-19 DIAGNOSIS — R74.8 ELEVATED LIVER ENZYMES: ICD-10-CM

## 2022-08-19 DIAGNOSIS — K75.81 NASH (NONALCOHOLIC STEATOHEPATITIS): ICD-10-CM

## 2022-08-19 DIAGNOSIS — Z01.818 PRE-OP TESTING: Primary | ICD-10-CM

## 2022-08-19 PROCEDURE — 88313 SPECIAL STAINS GROUP 2: CPT | Performed by: INTERNAL MEDICINE

## 2022-08-19 PROCEDURE — 47000 NEEDLE BIOPSY OF LIVER PERQ: CPT

## 2022-08-19 PROCEDURE — 76942 ECHO GUIDE FOR BIOPSY: CPT

## 2022-08-19 PROCEDURE — 99152 MOD SED SAME PHYS/QHP 5/>YRS: CPT

## 2022-08-19 PROCEDURE — 88307 TISSUE EXAM BY PATHOLOGIST: CPT | Performed by: INTERNAL MEDICINE

## 2022-08-19 PROCEDURE — 0FB23ZX EXCISION OF LEFT LOBE LIVER, PERCUTANEOUS APPROACH, DIAGNOSTIC: ICD-10-PCS | Performed by: RADIOLOGY

## 2022-08-19 RX ORDER — LIDOCAINE HYDROCHLORIDE 20 MG/ML
INJECTION, SOLUTION EPIDURAL; INFILTRATION; INTRACAUDAL; PERINEURAL
Status: COMPLETED
Start: 2022-08-19 | End: 2022-08-19

## 2022-08-19 RX ORDER — SODIUM CHLORIDE 9 MG/ML
INJECTION, SOLUTION INTRAVENOUS CONTINUOUS
Status: DISCONTINUED | OUTPATIENT
Start: 2022-08-19 | End: 2022-08-19

## 2022-08-19 RX ORDER — MIDAZOLAM HYDROCHLORIDE 1 MG/ML
INJECTION INTRAMUSCULAR; INTRAVENOUS
Status: DISCONTINUED
Start: 2022-08-19 | End: 2022-08-19

## 2022-08-19 NOTE — PROCEDURES
[facility]  Procedure Note    [address] Humph[place] Patient Status:  Outpatient in a Bed    1970 MRN [id number]   Location [facility] Attending Jas Crews MD   Hosp Day # 0 PCP Rachel Foster MD     Procedure: Ultrasound guided core liver biopsy    Pre-Procedure Diagnosis: Elevated liver enzymes [R74.8]  ROBB (nonalcoholic steatohepatitis) [K75.81]      Post-Procedure Diagnosis: Elevated liver enzymes [R74.8]  ROBB (nonalcoholic steatohepatitis) [K75.81]    Anesthesia:  Local and Sedation    Findings:  Under ultrasound guidance, two 18G core biopsy samples were obtained from left lobe of liver. Specimens: To pathology    Blood Loss:  2mL      Complications:  None    Drains:  none    Plan:  Observe for 2 hours.     Lissette Templeton MD  2022

## 2022-08-19 NOTE — IVS NOTE
Patient in IR for liver biopsy completed in holding rm 11. Timeout/universal protocol completed. 7 ml of 2% lidocaine given by Laura Boswell MD to RUQ. Patient monitored and 2L O2 given via NC, VSS during procedure,  2 passes taken under ultrasound guidance sent to lab. Patient tolerated procedure well.   Report given to THE [facility]        Nik Grier RN

## 2022-08-19 NOTE — PRE-SEDATION ASSESSMENT
[facility]  IR Pre-Procedure Sedation Assessment    History of snoring or sleep or apnea? Yes    History of previous problems with anesthesia or sedation  No    Physical Findings:  Neck: nl ROM  CV: RRR  PULM: normal respiratory rate/effort    Mallampati Score:  II (hard and soft palate, upper portion of tonsils anduvula visible)    ASA Classification:   2.  Patient with mild systemic disease    Plan:   -IV moderate sedation    Jose Carlos Bush MD

## 2022-08-19 NOTE — IVS NOTE
Patient awake and alert x 4    VSS , SU, No Pain, no PONV    Band aid to right upper quadrant dry,intact, no drainage  Site soft, no hematoma    Instructions given to patient he stated understanding    PIV removed    To main entrance via wheel chair, wife in car to drive him home

## 2022-08-22 ENCOUNTER — TELEPHONE (OUTPATIENT)
Dept: GASTROENTEROLOGY | Facility: CLINIC | Age: 52
End: 2022-08-22

## 2022-08-22 DIAGNOSIS — K75.81 STEATOHEPATITIS, NON-ALCOHOLIC: Primary | ICD-10-CM

## 2022-08-22 DIAGNOSIS — K74.00 HEPATIC FIBROSIS: ICD-10-CM

## 2022-08-22 NOTE — TELEPHONE ENCOUNTER
Approval (case #[id number] for Wixela 500-50 mcg/act aerosol powder (breath activated) received from [facility] which is effective 5/22/22 until 8/20/23. Pt notified of the previous. Explained will inform pharmacy now. He voiced understanding & appreciation. Auth was sent to scanning. Informed Ludwin Kelly at [facility]) that [employer] Sai 500-50 mcg/act aerosol powder was approved for pt by [facility]. He stts they will contact pt to set up delivery.

## 2022-08-23 NOTE — TELEPHONE ENCOUNTER
I spoke to Al. We discussed that his biopsy revealed grade 2/3 inflammation and stage III/IV steatohepatitis. We discussed modification of risk factors including weight loss (which has been difficult in light of intractable vertigo), control of blood sugar and triglycerides. I offered the patient the option of evaluation at tertiary care center for any investigational treatment options. He declines but is willing to see Dr. Kev Matthew who he has seen before and has lost weight. Referral placed. We discussed the increased risk of [address] and I am recommending [address] surveillance. He has an ultrasound scheduled in November. An alpha-fetoprotein will be drawn at that time as well.

## 2022-08-24 ENCOUNTER — TELEPHONE (OUTPATIENT)
Dept: GASTROENTEROLOGY | Facility: CLINIC | Age: 52
End: 2022-08-24

## 2022-08-24 NOTE — TELEPHONE ENCOUNTER
I returned the call-spoke to [facility] with central scheduling. Aware may use order from May and due for 6 month recall US in September. She will contact patient to schedule US liver as advised.

## 2022-08-24 NOTE — TELEPHONE ENCOUNTER
Dr. Shruthi Martin    Patient recently had US guided liver biopsy. Just wanted to clarify-does he still require US of liver to be done?     Thank you

## 2022-08-24 NOTE — TELEPHONE ENCOUNTER
Abril Pacheco from central scheduling called in wondering when pt should have ultrasound of the liver please follow up

## 2022-08-24 NOTE — TELEPHONE ENCOUNTER
Patient is scheduled    Your Appointments    Wednesday September 07, 2022  9:45 AM  US LIVER with ADO US [address]  (Marleni Hernandez) [place] [id number]  [phone]

## 2022-08-26 RX ORDER — FLUTICASONE PROPIONATE AND SALMETEROL 500; 50 UG/1; UG/1
POWDER RESPIRATORY (INHALATION)
Qty: 60 EACH | Refills: 0 | Status: SHIPPED | OUTPATIENT
Start: 2022-08-26

## 2022-08-26 NOTE — TELEPHONE ENCOUNTER
HKA:[birth year]  Last refill:08/17/22    Called pharmacy states they received script on 08/17/22 pt was concerned about having refill on file

## 2022-08-29 ENCOUNTER — TELEPHONE (OUTPATIENT)
Dept: PULMONOLOGY | Facility: CLINIC | Age: 52
End: 2022-08-29

## 2022-08-29 NOTE — TELEPHONE ENCOUNTER
Physicians order for CPAP supplies from [facility] and signed by DR. Manriquez with sign and fax information. Sent out for scanning.

## 2022-08-29 NOTE — TELEPHONE ENCOUNTER
Received physicians order from [address] [place] [address]. Placed in DR. Manriquez folder for review and signature.

## 2022-08-31 ENCOUNTER — MED REC SCAN ONLY (OUTPATIENT)
Dept: FAMILY MEDICINE CLINIC | Facility: CLINIC | Age: 52
End: 2022-08-31

## 2022-09-01 NOTE — TELEPHONE ENCOUNTER
Pt requesting to get order for cpap supplies sent to E. Pt. States that he is getting calls from another provider.

## 2022-09-02 NOTE — TELEPHONE ENCOUNTER
Spoke with patient states [facility] was calling him for supplies. Informed patient that [employer] is [facility]'s parent company. Patient verbalized understanding.

## 2022-09-07 ENCOUNTER — HOSPITAL ENCOUNTER (OUTPATIENT)
Dept: ULTRASOUND IMAGING | Age: 52
Discharge: HOME | End: 2022-09-07
Attending: INTERNAL MEDICINE
Payer: MEDICAID

## 2022-09-07 ENCOUNTER — LAB ENCOUNTER (OUTPATIENT)
Dept: LAB | Age: 52
End: 2022-09-07
Attending: INTERNAL MEDICINE
Payer: MEDICAID

## 2022-09-07 DIAGNOSIS — K75.81 NASH (NONALCOHOLIC STEATOHEPATITIS): ICD-10-CM

## 2022-09-07 DIAGNOSIS — K75.81 STEATOHEPATITIS, NON-ALCOHOLIC: ICD-10-CM

## 2022-09-07 DIAGNOSIS — K74.00 HEPATIC FIBROSIS: ICD-10-CM

## 2022-09-07 LAB
AFP-TM SERPL-MCNC: 3.7 NG/ML (ref ?–8)
ALBUMIN SERPL-MCNC: 3.9 G/DL (ref 3.4–5)
ALBUMIN/GLOB SERPL: 1.1 {RATIO} (ref 1–2)
ALP LIVER SERPL-CCNC: 102 U/L
ALT SERPL-CCNC: 335 U/L
ANION GAP SERPL CALC-SCNC: 8 MMOL/L (ref 0–18)
AST SERPL-CCNC: 150 U/L (ref 15–37)
BASOPHILS # BLD AUTO: 0.09 X10(3) UL (ref 0–0.2)
BASOPHILS NFR BLD AUTO: 1.1 %
BILIRUB SERPL-MCNC: 0.7 MG/DL (ref 0.1–2)
BUN BLD-MCNC: 21 MG/DL (ref 7–18)
BUN/CREAT SERPL: 21 (ref 10–20)
CALCIUM BLD-MCNC: 10.1 MG/DL (ref 8.5–10.1)
CHLORIDE SERPL-SCNC: 106 MMOL/L (ref 98–112)
CO2 SERPL-SCNC: 27 MMOL/L (ref 21–32)
CREAT BLD-MCNC: 1 MG/DL
DEPRECATED RDW RBC AUTO: 44.8 FL (ref 35.1–46.3)
EOSINOPHIL # BLD AUTO: 0.25 X10(3) UL (ref 0–0.7)
EOSINOPHIL NFR BLD AUTO: 3.2 %
ERYTHROCYTE [DISTWIDTH] IN BLOOD BY AUTOMATED COUNT: 13 % (ref 11–15)
FASTING STATUS PATIENT QL REPORTED: YES
GFR SERPLBLD BASED ON 1.73 SQ M-ARVRAT: 89 ML/MIN/1.73M2 (ref 60–?)
GLOBULIN PLAS-MCNC: 3.4 G/DL (ref 2.8–4.4)
GLUCOSE BLD-MCNC: 112 MG/DL (ref 70–99)
HCT VFR BLD AUTO: 47.5 %
HGB BLD-MCNC: 16 G/DL
IMM GRANULOCYTES # BLD AUTO: 0.03 X10(3) UL (ref 0–1)
IMM GRANULOCYTES NFR BLD: 0.4 %
LYMPHOCYTES # BLD AUTO: 1.94 X10(3) UL (ref 1–4)
LYMPHOCYTES NFR BLD AUTO: 24.6 %
MCH RBC QN AUTO: 31.7 PG (ref 26–34)
MCHC RBC AUTO-ENTMCNC: 33.7 G/DL (ref 31–37)
MCV RBC AUTO: 94.1 FL
MONOCYTES # BLD AUTO: 0.64 X10(3) UL (ref 0.1–1)
MONOCYTES NFR BLD AUTO: 8.1 %
NEUTROPHILS # BLD AUTO: 4.93 X10 (3) UL (ref 1.5–7.7)
NEUTROPHILS # BLD AUTO: 4.93 X10(3) UL (ref 1.5–7.7)
NEUTROPHILS NFR BLD AUTO: 62.6 %
OSMOLALITY SERPL CALC.SUM OF ELEC: 296 MOSM/KG (ref 275–295)
PLATELET # BLD AUTO: 170 10(3)UL (ref 150–450)
POTASSIUM SERPL-SCNC: 4.1 MMOL/L (ref 3.5–5.1)
PROT SERPL-MCNC: 7.3 G/DL (ref 6.4–8.2)
RBC # BLD AUTO: 5.05 X10(6)UL
SODIUM SERPL-SCNC: 141 MMOL/L (ref 136–145)
WBC # BLD AUTO: 7.9 X10(3) UL (ref 4–11)

## 2022-09-07 PROCEDURE — 82105 ALPHA-FETOPROTEIN SERUM: CPT

## 2022-09-07 PROCEDURE — 36415 COLL VENOUS BLD VENIPUNCTURE: CPT

## 2022-09-07 PROCEDURE — 85025 COMPLETE CBC W/AUTO DIFF WBC: CPT

## 2022-09-07 PROCEDURE — 76705 ECHO EXAM OF ABDOMEN: CPT | Performed by: INTERNAL MEDICINE

## 2022-09-07 PROCEDURE — 80053 COMPREHEN METABOLIC PANEL: CPT

## 2022-09-09 RX ORDER — TIOTROPIUM BROMIDE 18 UG/1
CAPSULE ORAL; RESPIRATORY (INHALATION)
Qty: 30 CAPSULE | Refills: 3 | Status: SHIPPED | OUTPATIENT
Start: 2022-09-09

## 2022-09-09 NOTE — TELEPHONE ENCOUNTER
LOV: 6/21/2022  Last refill: 5/16/2022    Linh Parada PA-C-please review and sign pended prescription if agreeable.

## 2022-09-21 ENCOUNTER — PATIENT MESSAGE (OUTPATIENT)
Dept: GASTROENTEROLOGY | Facility: CLINIC | Age: 52
End: 2022-09-21

## 2022-09-21 RX ORDER — FLUTICASONE PROPIONATE AND SALMETEROL 500; 50 UG/1; UG/1
POWDER RESPIRATORY (INHALATION)
Qty: 60 EACH | Refills: 3 | Status: SHIPPED | OUTPATIENT
Start: 2022-09-21

## 2022-09-21 NOTE — TELEPHONE ENCOUNTER
Rx request for [facility], please review and sign off if appropriate. Thank you.     Last seen: 6/21/22  Last refill: 8/26/22

## 2022-09-22 NOTE — TELEPHONE ENCOUNTER
From: Duyen Baxter  To: Enriqueta Oh MD  Sent: 9/21/2022 6:48 PM CDT  Subject: Question regarding COMP METABOLIC PANEL (14)    So how does this affect me, and what do I need to do?

## 2022-09-30 ENCOUNTER — TELEPHONE (OUTPATIENT)
Dept: GASTROENTEROLOGY | Facility: CLINIC | Age: 52
End: 2022-09-30

## 2022-09-30 NOTE — TELEPHONE ENCOUNTER
I spoke to Wy[place]. I reviewed his liver biopsy results with the pathologist.  The findings are consistent with steatohepatitis. The findings do not suggest a drug-induced liver injury (DILI) or autoimmune hepatitis. I have also reached out to Dr. Ty Johnson who will try to see the patient sooner.

## 2022-09-30 NOTE — TELEPHONE ENCOUNTER
----- Message from Matias Sarmiento MD sent at 9/30/2022  3:18 PM CDT -----  Please see 9/21/2022 MyChart encounter with documentation from 9/22/2022 and 9/30/2022:\"I spoke to the patient. He is feeling well. Unfortunately the vertigo persists. Weight is down #5. His transaminases remain significantly elevated. Synthetic function is normal.  Medications are reviewed. Kallie Ream would be unlikely to cause elevated liver enzymes. There are no other potentially causative medications. His recent liver biopsy revealed steatohepatitis. He cannot see bariatrics until January. We will attempt to move up this appointment. I will also review the patient's liver biopsy with pathology and contact him after this review\". \"I spoke to Pollock. I reviewed his liver biopsy results with the pathologist.  The findings are consistent with steatohepatitis. The findings do not suggest a drug-induced liver injury (DILI) or autoimmune hepatitis. I have also reached out to Dr. Majo Bear who will try to see the patient sooner\". GI RNs: Please enter lab recall (CMP, CBC, alpha-fetoprotein) and liver ultrasound recall for 6 months.

## 2022-09-30 NOTE — TELEPHONE ENCOUNTER
Recall labs (CMP, CBC, AFP) & liver US in 6 months per Dr. Spencer aTylor. Last done:9/7/22  Next due:3/7/23    Updated snapshot and pt outreach.

## 2022-10-03 RX ORDER — ALPRAZOLAM 1 MG/1
1 TABLET ORAL NIGHTLY PRN
Qty: 30 TABLET | Refills: 0 | Status: SHIPPED | OUTPATIENT
Start: 2022-10-03

## 2022-10-03 NOTE — TELEPHONE ENCOUNTER
Please review refill protocol failed/ no protocol  Requested Prescriptions   Pending Prescriptions Disp Refills    ALPRAZOLAM 1 MG Oral Tab [Pharmacy Med Name: ALPRAZOLAM 1MG TABLETS] 30 tablet 0     Sig: TAKE 1 TABLET(1 MG) BY MOUTH EVERY NIGHT AS NEEDED FOR SLEEP        There is no refill protocol information for this order

## 2022-10-14 ENCOUNTER — OFFICE VISIT (OUTPATIENT)
Dept: SURGERY | Facility: CLINIC | Age: 52
End: 2022-10-14
Payer: MEDICAID

## 2022-10-14 VITALS
BODY MASS INDEX: 44.4 KG/M2 | DIASTOLIC BLOOD PRESSURE: 80 MMHG | HEART RATE: 54 BPM | OXYGEN SATURATION: 97 % | WEIGHT: 273 LBS | HEIGHT: 65.75 IN | SYSTOLIC BLOOD PRESSURE: 158 MMHG

## 2022-10-14 DIAGNOSIS — R73.01 IMPAIRED FASTING BLOOD SUGAR: ICD-10-CM

## 2022-10-14 DIAGNOSIS — R63.5 WEIGHT GAIN: ICD-10-CM

## 2022-10-14 DIAGNOSIS — E78.5 DYSLIPIDEMIA: Primary | ICD-10-CM

## 2022-10-14 DIAGNOSIS — I51.9 HEART DISEASE: ICD-10-CM

## 2022-10-14 DIAGNOSIS — J44.9 CHRONIC OBSTRUCTIVE PULMONARY DISEASE, UNSPECIFIED COPD TYPE (HCC): ICD-10-CM

## 2022-10-14 DIAGNOSIS — Z99.89 OSA ON CPAP: ICD-10-CM

## 2022-10-14 DIAGNOSIS — G47.33 OSA ON CPAP: ICD-10-CM

## 2022-10-14 DIAGNOSIS — E66.01 MORBID OBESITY WITH BMI OF 40.0-44.9, ADULT (HCC): ICD-10-CM

## 2022-10-14 DIAGNOSIS — I10 ESSENTIAL HYPERTENSION, BENIGN: ICD-10-CM

## 2022-10-14 PROCEDURE — 3077F SYST BP >= 140 MM HG: CPT | Performed by: NURSE PRACTITIONER

## 2022-10-14 PROCEDURE — 3008F BODY MASS INDEX DOCD: CPT | Performed by: NURSE PRACTITIONER

## 2022-10-14 PROCEDURE — 99204 OFFICE O/P NEW MOD 45 MIN: CPT | Performed by: NURSE PRACTITIONER

## 2022-10-14 PROCEDURE — 3079F DIAST BP 80-89 MM HG: CPT | Performed by: NURSE PRACTITIONER

## 2022-10-14 RX ORDER — EVOLOCUMAB 140 MG/ML
INJECTION, SOLUTION SUBCUTANEOUS
COMMUNITY
Start: 2022-09-29

## 2022-10-14 NOTE — PATIENT INSTRUCTIONS
Get labs    Carb Manager  Lose It  My Fitness Pal    Daily Calories:  120-174 lbs: 1200 calories/day. 175-219 lbs: 1500 calories/day. 220-249 lbs: 1800 calories/day. 250 lbs or more: 2,000 calories/day. Protein:  grams/day. Carbs: 80 grams/day. I recommend a whole food, plant powered diet with low glycemic index:     Aim for 3 meals a day and 1-2 snacks as needed. Aim for a protein + produce at each meal time. Keep meals between 7 am and 7 pm.     Breakfast ideas:  1. Fruit and no added sugar nut butter. 2. Eggs scrambled with vegetables, cottage cheese. 3. Oatmeal (stovetop), cinnamon, 2 tbsp flaxseed, berries, peanut butter. [address] [facility]   4. Protein shake + fruit. (1 scoop with water/ice). Rolo Solorzano is a good brand. Orgain. Snacks:  Raw vegetables and hummus, apples and peanut butter, nuts, seeds, fruit. Use the Healthy Plate method for lunch and dinner:  1/2 right side of plate non-starchy vegetables. Bottom left 1/4 plate protein. Top left 1/4 starch as desired. Aim for a total of:  2 fruits a day (avocado, tomato, citrus/oranges, apples, berries)    4 non-starchy vegetables (handful) (greens, peppers, onions, garlic, brussels sprouts, zucchini, cauliflower, etc.)    0-2 starches (oatmeal, sweet potato, carrots, brown rice, etc). 3 protein per day (fish, seafood, meat, or plants: salmon, nuts, seeds, shrimp, chicken, turkey, beans, lentils, chickpeas, etc).     2 healthy fats (avocado, avocado oil, olives, olive oil, salmon, nuts, seeds)

## 2022-10-18 ENCOUNTER — LAB ENCOUNTER (OUTPATIENT)
Dept: LAB | Age: 52
End: 2022-10-18
Attending: NURSE PRACTITIONER
Payer: MEDICAID

## 2022-10-18 DIAGNOSIS — R73.01 IMPAIRED FASTING BLOOD SUGAR: ICD-10-CM

## 2022-10-18 DIAGNOSIS — Z99.89 OSA ON CPAP: ICD-10-CM

## 2022-10-18 DIAGNOSIS — J44.9 CHRONIC OBSTRUCTIVE PULMONARY DISEASE, UNSPECIFIED COPD TYPE (HCC): ICD-10-CM

## 2022-10-18 DIAGNOSIS — G47.33 OSA ON CPAP: ICD-10-CM

## 2022-10-18 DIAGNOSIS — I10 ESSENTIAL HYPERTENSION, BENIGN: ICD-10-CM

## 2022-10-18 DIAGNOSIS — E66.01 MORBID OBESITY WITH BMI OF 40.0-44.9, ADULT (HCC): ICD-10-CM

## 2022-10-18 DIAGNOSIS — E78.5 DYSLIPIDEMIA: ICD-10-CM

## 2022-10-18 DIAGNOSIS — R63.5 WEIGHT GAIN: ICD-10-CM

## 2022-10-18 DIAGNOSIS — I51.9 HEART DISEASE: ICD-10-CM

## 2022-10-18 LAB
EST. AVERAGE GLUCOSE BLD GHB EST-MCNC: 114 MG/DL (ref 68–126)
HBA1C MFR BLD: 5.6 % (ref ?–5.7)
INSULIN SERPL-ACNC: 39.7 MU/L (ref 3–25)

## 2022-10-18 PROCEDURE — 83525 ASSAY OF INSULIN: CPT

## 2022-10-18 PROCEDURE — 36415 COLL VENOUS BLD VENIPUNCTURE: CPT

## 2022-10-18 PROCEDURE — 82397 CHEMILUMINESCENT ASSAY: CPT

## 2022-10-18 PROCEDURE — 83036 HEMOGLOBIN GLYCOSYLATED A1C: CPT

## 2022-10-21 LAB — LEPTIN: 45.9 NG/ML

## 2022-10-24 ENCOUNTER — TELEPHONE (OUTPATIENT)
Dept: SURGERY | Facility: CLINIC | Age: 52
End: 2022-10-24

## 2022-10-24 DIAGNOSIS — I10 ESSENTIAL HYPERTENSION, BENIGN: ICD-10-CM

## 2022-10-24 DIAGNOSIS — I51.9 HEART DISEASE: ICD-10-CM

## 2022-10-24 DIAGNOSIS — R63.5 WEIGHT GAIN: Primary | ICD-10-CM

## 2022-10-24 DIAGNOSIS — G47.33 OSA ON CPAP: ICD-10-CM

## 2022-10-24 DIAGNOSIS — J44.9 CHRONIC OBSTRUCTIVE PULMONARY DISEASE, UNSPECIFIED COPD TYPE (HCC): ICD-10-CM

## 2022-10-24 DIAGNOSIS — E66.01 MORBID OBESITY WITH BMI OF 40.0-44.9, ADULT (HCC): ICD-10-CM

## 2022-10-24 DIAGNOSIS — E66.01 MORBID OBESITY WITH BMI OF 40.0-44.9, ADULT (HCC): Primary | ICD-10-CM

## 2022-10-24 DIAGNOSIS — R73.01 IMPAIRED FASTING BLOOD SUGAR: ICD-10-CM

## 2022-10-24 DIAGNOSIS — Z99.89 OSA ON CPAP: ICD-10-CM

## 2022-10-24 DIAGNOSIS — E78.5 DYSLIPIDEMIA: ICD-10-CM

## 2022-10-24 RX ORDER — TOPIRAMATE 25 MG/1
25 TABLET ORAL DAILY
Qty: 30 TABLET | Refills: 1 | Status: SHIPPED | OUTPATIENT
Start: 2022-10-24

## 2022-10-29 ENCOUNTER — TELEPHONE (OUTPATIENT)
Facility: CLINIC | Age: 52
End: 2022-10-29

## 2022-10-29 NOTE — TELEPHONE ENCOUNTER
1st,overdue reminder letter sent out to patient    Imaging order:       BREANN Tello [address] ([facility]=[id number])

## 2022-10-31 ENCOUNTER — TELEPHONE (OUTPATIENT)
Facility: CLINIC | Age: 52
End: 2022-10-31

## 2022-10-31 NOTE — TELEPHONE ENCOUNTER
Pt called with questions about letter he received in his My Chart about missed liver biopsy. Pt states that he had it done on 8/19/22. Please call.

## 2022-11-01 RX ORDER — BUDESONIDE 3 MG/1
CAPSULE, COATED PELLETS ORAL
Qty: 30 CAPSULE | Refills: 0 | Status: SHIPPED | OUTPATIENT
Start: 2022-11-01

## 2022-11-01 NOTE — TELEPHONE ENCOUNTER
Happy to hear about the weight loss! Please have the patient decrease the budesonide to 1 capsule in the morning. He will take this for 30 days and if doing well stop. Please have the patient contact us if the diarrhea increases after decreasing or stopping the budesonide.

## 2022-11-01 NOTE — TELEPHONE ENCOUNTER
I spoke to the pt    Pt is having about 2 soft stools/day    No nocturnal urges    No urgency    No abdominal pain    Currently taking 2 pills/daily of budesonide since August    Pt saw Clayton BELL    Pt is taking topiramate and he is on the treadmill 20 minutes/day.  Lost about 11 pounds

## 2022-11-01 NOTE — TELEPHONE ENCOUNTER
Dr Rosa Bullard    Pt had his liver biopsy on 08/19/2022    There is a open order in the computer for a liver biopsy    Can we cancel this order or is the pt likely to have to repeat a biopsy.     I know you want him to do another us liver/elastography in March 2023

## 2022-11-09 ENCOUNTER — TELEPHONE (OUTPATIENT)
Dept: PULMONOLOGY | Facility: CLINIC | Age: 52
End: 2022-11-09

## 2022-11-09 ENCOUNTER — TELEMEDICINE (OUTPATIENT)
Dept: SURGERY | Facility: CLINIC | Age: 52
End: 2022-11-09

## 2022-11-09 VITALS — WEIGHT: 263 LBS | BODY MASS INDEX: 43 KG/M2

## 2022-11-09 DIAGNOSIS — I51.9 HEART DISEASE: ICD-10-CM

## 2022-11-09 DIAGNOSIS — Z99.89 OSA ON CPAP: ICD-10-CM

## 2022-11-09 DIAGNOSIS — J44.9 CHRONIC OBSTRUCTIVE PULMONARY DISEASE, UNSPECIFIED COPD TYPE (HCC): ICD-10-CM

## 2022-11-09 DIAGNOSIS — G47.33 OSA ON CPAP: ICD-10-CM

## 2022-11-09 DIAGNOSIS — E78.5 DYSLIPIDEMIA: ICD-10-CM

## 2022-11-09 DIAGNOSIS — I10 ESSENTIAL HYPERTENSION, BENIGN: Primary | ICD-10-CM

## 2022-11-09 DIAGNOSIS — E66.01 MORBID OBESITY WITH BMI OF 40.0-44.9, ADULT (HCC): ICD-10-CM

## 2022-11-09 PROCEDURE — 99213 OFFICE O/P EST LOW 20 MIN: CPT | Performed by: NURSE PRACTITIONER

## 2022-11-10 NOTE — TELEPHONE ENCOUNTER
LOV 6/21/22 w/ Mirella Jones PA-C.  CT chest LD due approximately 12/16/22 & scheduled 11/18. Pt is scheduled for an appt w/ Haritha Patiño on 12/20. Explained to pt that CT chest LD should be scheduled @ 6 mo interval based on radiologist & provider recommendations and for insurance purposes. Encouraged him to keep appt w/ Haritha Haroon @ 6 mo interval per last office visit instructions. Reassured him to call Central Scheduling to reschedule his CT closer to due date & that he may reschedule appt w/ Haritha Haroon if necessary. He voiced understanding.

## 2022-11-30 ENCOUNTER — LAB ENCOUNTER (OUTPATIENT)
Dept: LAB | Age: 52
End: 2022-11-30
Attending: INTERNAL MEDICINE
Payer: MEDICAID

## 2022-11-30 DIAGNOSIS — I10 ESSENTIAL HYPERTENSION, BENIGN: ICD-10-CM

## 2022-11-30 DIAGNOSIS — G47.33 OSA ON CPAP: ICD-10-CM

## 2022-11-30 DIAGNOSIS — J44.9 CHRONIC OBSTRUCTIVE PULMONARY DISEASE, UNSPECIFIED COPD TYPE (HCC): ICD-10-CM

## 2022-11-30 DIAGNOSIS — E78.5 DYSLIPIDEMIA: ICD-10-CM

## 2022-11-30 DIAGNOSIS — R73.01 IMPAIRED FASTING BLOOD SUGAR: ICD-10-CM

## 2022-11-30 DIAGNOSIS — I51.9 HEART DISEASE: ICD-10-CM

## 2022-11-30 DIAGNOSIS — E66.01 MORBID OBESITY WITH BMI OF 40.0-44.9, ADULT (HCC): ICD-10-CM

## 2022-11-30 DIAGNOSIS — Z99.89 OSA ON CPAP: ICD-10-CM

## 2022-11-30 DIAGNOSIS — R63.5 WEIGHT GAIN: ICD-10-CM

## 2022-11-30 LAB
ALT SERPL-CCNC: 206 U/L
AST SERPL-CCNC: 76 U/L (ref 15–37)
CHOLEST SERPL-MCNC: 171 MG/DL (ref ?–200)
FASTING PATIENT LIPID ANSWER: YES
HDLC SERPL-MCNC: 52 MG/DL (ref 40–59)
LDLC SERPL CALC-MCNC: 84 MG/DL (ref ?–100)
NONHDLC SERPL-MCNC: 119 MG/DL (ref ?–130)
T4 FREE SERPL-MCNC: 0.9 NG/DL (ref 0.8–1.7)
TRIGL SERPL-MCNC: 207 MG/DL (ref 30–149)
TSI SER-ACNC: 2.29 MIU/ML (ref 0.36–3.74)
VLDLC SERPL CALC-MCNC: 33 MG/DL (ref 0–30)

## 2022-11-30 PROCEDURE — 84443 ASSAY THYROID STIM HORMONE: CPT

## 2022-11-30 PROCEDURE — 84439 ASSAY OF FREE THYROXINE: CPT

## 2022-11-30 PROCEDURE — 36415 COLL VENOUS BLD VENIPUNCTURE: CPT

## 2022-11-30 PROCEDURE — 80061 LIPID PANEL: CPT

## 2022-11-30 PROCEDURE — 84460 ALANINE AMINO (ALT) (SGPT): CPT

## 2022-11-30 PROCEDURE — 84450 TRANSFERASE (AST) (SGOT): CPT

## 2022-12-20 ENCOUNTER — OFFICE VISIT (OUTPATIENT)
Dept: PULMONOLOGY | Facility: CLINIC | Age: 52
End: 2022-12-20
Payer: MEDICAID

## 2022-12-20 ENCOUNTER — HOSPITAL ENCOUNTER (OUTPATIENT)
Dept: CT IMAGING | Facility: HOSPITAL | Age: 52
Discharge: HOME | End: 2022-12-20
Attending: PHYSICIAN ASSISTANT
Payer: MEDICAID

## 2022-12-20 VITALS
SYSTOLIC BLOOD PRESSURE: 146 MMHG | RESPIRATION RATE: 16 BRPM | OXYGEN SATURATION: 96 % | BODY MASS INDEX: 44.65 KG/M2 | HEIGHT: 65 IN | WEIGHT: 268 LBS | HEART RATE: 67 BPM | DIASTOLIC BLOOD PRESSURE: 73 MMHG

## 2022-12-20 DIAGNOSIS — R91.1 PULMONARY NODULE: ICD-10-CM

## 2022-12-20 DIAGNOSIS — Z99.89 OSA ON CPAP: Primary | ICD-10-CM

## 2022-12-20 DIAGNOSIS — Z72.0 TOBACCO ABUSE: ICD-10-CM

## 2022-12-20 DIAGNOSIS — J44.9 CHRONIC OBSTRUCTIVE PULMONARY DISEASE, UNSPECIFIED COPD TYPE (HCC): ICD-10-CM

## 2022-12-20 DIAGNOSIS — G47.33 OSA ON CPAP: Primary | ICD-10-CM

## 2022-12-20 PROCEDURE — 3078F DIAST BP <80 MM HG: CPT | Performed by: PHYSICIAN ASSISTANT

## 2022-12-20 PROCEDURE — 3077F SYST BP >= 140 MM HG: CPT | Performed by: PHYSICIAN ASSISTANT

## 2022-12-20 PROCEDURE — 3008F BODY MASS INDEX DOCD: CPT | Performed by: PHYSICIAN ASSISTANT

## 2022-12-20 PROCEDURE — 99214 OFFICE O/P EST MOD 30 MIN: CPT | Performed by: PHYSICIAN ASSISTANT

## 2022-12-20 PROCEDURE — 71250 CT THORAX DX C-: CPT | Performed by: PHYSICIAN ASSISTANT

## 2022-12-20 RX ORDER — NICOTINE 10 MG
1 CARTRIDGE (EA) INHALATION AS NEEDED
Qty: 168 EACH | Refills: 0 | Status: SHIPPED | OUTPATIENT
Start: 2022-12-20

## 2022-12-20 RX ORDER — IPRATROPIUM BROMIDE AND ALBUTEROL SULFATE 2.5; .5 MG/3ML; MG/3ML
3 SOLUTION RESPIRATORY (INHALATION) EVERY 6 HOURS PRN
Qty: 180 ML | Refills: 2 | Status: SHIPPED | OUTPATIENT
Start: 2022-12-20

## 2022-12-21 DIAGNOSIS — Z87.891 PERSONAL HISTORY OF TOBACCO USE, PRESENTING HAZARDS TO HEALTH: Primary | ICD-10-CM

## 2022-12-30 ENCOUNTER — OFFICE VISIT (OUTPATIENT)
Dept: SURGERY | Facility: CLINIC | Age: 52
End: 2022-12-30
Payer: MEDICAID

## 2022-12-30 VITALS
WEIGHT: 272 LBS | DIASTOLIC BLOOD PRESSURE: 80 MMHG | BODY MASS INDEX: 44.24 KG/M2 | HEIGHT: 65.75 IN | SYSTOLIC BLOOD PRESSURE: 142 MMHG | OXYGEN SATURATION: 96 % | HEART RATE: 86 BPM

## 2022-12-30 DIAGNOSIS — J44.9 CHRONIC OBSTRUCTIVE PULMONARY DISEASE, UNSPECIFIED COPD TYPE (HCC): ICD-10-CM

## 2022-12-30 DIAGNOSIS — I10 ESSENTIAL HYPERTENSION, BENIGN: Primary | ICD-10-CM

## 2022-12-30 DIAGNOSIS — E66.01 MORBID OBESITY WITH BMI OF 40.0-44.9, ADULT (HCC): ICD-10-CM

## 2022-12-30 DIAGNOSIS — E78.5 DYSLIPIDEMIA: ICD-10-CM

## 2022-12-30 DIAGNOSIS — Z99.89 OSA ON CPAP: ICD-10-CM

## 2022-12-30 DIAGNOSIS — I51.9 HEART DISEASE: ICD-10-CM

## 2022-12-30 DIAGNOSIS — G47.33 OSA ON CPAP: ICD-10-CM

## 2022-12-30 PROCEDURE — 99213 OFFICE O/P EST LOW 20 MIN: CPT | Performed by: NURSE PRACTITIONER

## 2022-12-30 PROCEDURE — 3077F SYST BP >= 140 MM HG: CPT | Performed by: NURSE PRACTITIONER

## 2022-12-30 PROCEDURE — 3079F DIAST BP 80-89 MM HG: CPT | Performed by: NURSE PRACTITIONER

## 2022-12-30 PROCEDURE — 3008F BODY MASS INDEX DOCD: CPT | Performed by: NURSE PRACTITIONER

## 2023-01-05 RX ORDER — TIOTROPIUM BROMIDE 18 UG/1
CAPSULE ORAL; RESPIRATORY (INHALATION)
Qty: 30 CAPSULE | Refills: 3 | Status: SHIPPED | OUTPATIENT
Start: 2023-01-05

## 2023-01-05 NOTE — TELEPHONE ENCOUNTER
LOV: 12/20/2022  Last refill: 9/8/22    Ronel John PA-C--please review and sign pended prescription if agreeable.

## 2023-01-25 NOTE — TELEPHONE ENCOUNTER
Calling to get dx code for Budesonide rx - faxing over request [FreeTextEntry1] : - Continue physical therapy, occupation therapy and speech therapy\par - Labs: CBC, CMP, Vit D, and Vimpat level\par - Continue Vimpat 6 ml in AM and 12 ml in PM\par - Follow up in 6 months\par \par

## 2023-02-01 NOTE — TELEPHONE ENCOUNTER
Please review. Protocol failed/ No protocol      Requested Prescriptions   Pending Prescriptions Disp Refills    METOPROLOL TARTRATE 50 MG Oral Tab [Pharmacy Med Name: METOPROLOL TARTRATE 50MG TABLETS] 180 tablet 0     Sig: TAKE 1 TABLET(50 MG) BY MOUTH TWICE DAILY       Hypertensive Medications Protocol Failed - 1/31/2023 11:11 AM        Failed - Last BP reading less than 140/90     BP Readings from Last 1 Encounters:  12/30/22 : 142/80              Failed - In person appointment or virtual visit in the past 6 months     Recent Outpatient Visits              1 month ago Essential hypertension, benign    [address], [address], [place], [place], APRN    Office Visit    1 month ago MADELIN on CPAP    [facility], [address] [place] [address], [facility]    Office Visit    2 months ago Essential hypertension, benign    [facility], [address], [place], [place], APRN    Telemedicine    3 months ago Dyslipidemia    [address], [address], [place], [place], APRN    Office Visit    5 months ago Pre-op testing    [place], [place], [place]    Nurse Only          Future Appointments         Provider Department Appt Notes    In 1 month LIANA Vee [address], [address], [facility]  NON SX F/U               Passed - In person appointment in the past 12 or next 3 months     Recent Outpatient Visits              1 month ago Essential hypertension, benign    [address], [address], [place], [place], APRN    Office Visit    1 month ago MADELIN on CPAP    [facility], [address] [facility]    Office Visit    2 months ago Essential hypertension, benign    [facility], [address], [place], [place], APRN    Telemedicine    3 months ago Dyslipidemia    [address], [address], Eileen Montes APRN    Office Visit    5 months ago Pre-op testing    [address] [facility], [place]    Nurse Only          Future Appointments         Provider Department Appt Notes    In 1 month LIANA [facility], [address], [facility]  NON SX F/U               Passed - CMP or BMP in past 6 months     Recent Results (from the past 4392 hour(s))   COMP METABOLIC PANEL (14)    Collection Time: 09/07/22  9:59 AM   Result Value Ref Range    Glucose 112 (H) 70 - 99 mg/dL    Sodium 141 136 - 145 mmol/L    Potassium 4.1 3.5 - 5.1 mmol/L    Chloride 106 98 - 112 mmol/L    CO2 27.0 21.0 - 32.0 mmol/L    Anion Gap 8 0 - 18 mmol/L    BUN 21 (H) 7 - 18 mg/dL    Creatinine 1.00 0.70 - 1.30 mg/dL    BUN/CREA Ratio 21.0 (H) 10.0 - 20.0    Calcium, Total 10.1 8.5 - 10.1 mg/dL    Calculated Osmolality 296 (H) 275 - 295 mOsm/kg    eGFR-Cr 89 >=60 mL/min/1.73m2     (H) 16 - 61 U/L     (H) 15 - 37 U/L    Alkaline Phosphatase 102 45 - 117 U/L    Bilirubin, Total 0.7 0.1 - 2.0 mg/dL    Total Protein 7.3 6.4 - 8.2 g/dL    Albumin 3.9 3.4 - 5.0 g/dL    Globulin  3.4 2.8 - 4.4 g/dL    A/G Ratio 1.1 1.0 - 2.0    Patient Fasting for CMP? Yes      *Note: Due to a large number of results and/or encounters for the requested time period, some results have not been displayed. A complete set of results can be found in Results Review.                Passed - EGFRCR or GFRNAA > 50     GFR Evaluation  EGFRCR: 89 , resulted on 9/7/2022            ALPRAZOLAM 1 MG Oral Tab [Pharmacy Med Name: ALPRAZOLAM 1MG TABLETS] 15 tablet 0     Sig: TAKE 1 TABLET(1 MG) BY MOUTH EVERY NIGHT AS NEEDED FOR SLEEP       There is no refill protocol information for this order          Future Appointments         Provider Department Appt Notes    In 1 month LIANA [facility], [address], [facility]  NON SX F/U             Recent Outpatient Visits              1 month ago Essential hypertension, benign    [facility], [address], Jhoana Bloom APRN    Office Visit    1 month ago MADELIN on CPAP    [facility], [address] [facility], [place], [place], [employer]    Office Visit    2 months ago Essential hypertension, benign    [facility], [address], Jhoana Bloom APRN    Telemedicine    3 months ago Dyslipidemia    [address], [address], Jhoana Bloom, LIANA    Office Visit    5 months ago Pre-op testing    [address] [place] [address], [place]    Nurse Only

## 2023-02-02 RX ORDER — ALPRAZOLAM 1 MG/1
TABLET ORAL
Qty: 15 TABLET | Refills: 0 | Status: SHIPPED | OUTPATIENT
Start: 2023-02-02

## 2023-02-02 RX ORDER — METOPROLOL TARTRATE 50 MG/1
50 TABLET, FILM COATED ORAL 2 TIMES DAILY
Qty: 30 TABLET | Refills: 0 | Status: SHIPPED | OUTPATIENT
Start: 2023-02-02

## 2023-02-09 RX ORDER — FLUTICASONE PROPIONATE AND SALMETEROL 500; 50 UG/1; UG/1
POWDER RESPIRATORY (INHALATION)
Qty: 60 EACH | Refills: 3 | Status: SHIPPED | OUTPATIENT
Start: 2023-02-09

## 2023-02-27 RX ORDER — TIOTROPIUM BROMIDE 18 UG/1
CAPSULE ORAL; RESPIRATORY (INHALATION)
Qty: 30 CAPSULE | Refills: 3 | Status: SHIPPED | OUTPATIENT
Start: 2023-02-27

## 2023-02-27 NOTE — TELEPHONE ENCOUNTER
LOV: 12/20/2022  Last refill: 1/5/2023    Som Rowan PA-C-please review and sign pended prescription if agreeable.

## 2023-02-28 RX ORDER — METOPROLOL TARTRATE 50 MG/1
50 TABLET, FILM COATED ORAL 2 TIMES DAILY
Qty: 180 TABLET | Refills: 0 | Status: SHIPPED | OUTPATIENT
Start: 2023-02-28

## 2023-03-01 NOTE — TELEPHONE ENCOUNTER
Protocol failed or has No Protocol, please review    Medication pended for your review / approval    Requested Prescriptions   Pending Prescriptions Disp Refills    METOPROLOL TARTRATE 50 MG Oral Tab [Pharmacy Med Name: METOPROLOL TARTRATE 50MG TABLETS] 180 tablet 0     Sig: TAKE 1 TABLET(50 MG) BY MOUTH TWICE DAILY       Hypertensive Medications Protocol Failed - 2/28/2023  4:57 PM        Failed - Last BP reading less than 140/90     BP Readings from Last 1 Encounters:  02/09/23 : 152/70              Passed - In person appointment in the past 12 or next 3 months     Recent Outpatient Visits              2 weeks ago [address] Adrian Pereira MD    Office Visit    2 months ago Essential hypertension, [facility], [address], Alesia Bloom APRN    Office Visit    2 months ago MADELIN on CPAP    [facility], [facility]    Office Visit    3 months ago Essential hypertension, [facility], [address], Alesia Bloom, LIANA    Telemedicine    4 months ago Dyslipidemia    [address], [address], Alesia Bloom APRN    Office Visit          Future Appointments         Provider Department Appt Notes    In 1 week Sonido Zhou MD [address], [facility] My blood pressure medication, and need referal for Dr Phyllis Galvan!     In 1 month LIANA Palm [facility], [address], [facility]  NON SX F/U               Passed - CMP or BMP in past 6 months     Recent Results (from the past 4392 hour(s))   COMP METABOLIC PANEL (14)    Collection Time: 09/07/22  9:59 AM   Result Value Ref Range    Glucose 112 (H) 70 - 99 mg/dL    Sodium 141 136 - 145 mmol/L    Potassium 4.1 3.5 - 5.1 mmol/L    Chloride 106 98 - 112 mmol/L    CO2 27.0 21.0 - 32.0 mmol/L    Anion Gap 8 0 - 18 mmol/L    BUN 21 (H) 7 - 18 mg/dL    Creatinine 1.00 0.70 - 1.30 mg/dL    BUN/CREA Ratio 21.0 (H) 10.0 - 20.0    Calcium, Total 10.1 8.5 - 10.1 mg/dL    Calculated Osmolality 296 (H) 275 - 295 mOsm/kg    eGFR-Cr 89 >=60 mL/min/1.73m2     (H) 16 - 61 U/L     (H) 15 - 37 U/L    Alkaline Phosphatase 102 45 - 117 U/L    Bilirubin, Total 0.7 0.1 - 2.0 mg/dL    Total Protein 7.3 6.4 - 8.2 g/dL    Albumin 3.9 3.4 - 5.0 g/dL    Globulin  3.4 2.8 - 4.4 g/dL    A/G Ratio 1.1 1.0 - 2.0    Patient Fasting for CMP? Yes      *Note: Due to a large number of results and/or encounters for the requested time period, some results have not been displayed. A complete set of results can be found in Results Review. Passed - In person appointment or virtual visit in the past 6 months     Recent Outpatient Visits              2 weeks ago [address] Tod Pereira MD    Office Visit    2 months ago Essential hypertension, [facility], [address], DawsonEvie APRN    Office Visit    2 months ago MADELIN on CPAP    [facility], [address] [place] [address]    Office Visit    3 months ago Essential hypertension, benign    [facility], [address], DawsonEvie, LIANA    Telemedicine    4 months ago Dyslipidemia    [address], [address], DawsonEvie APRN    Office Visit          Future Appointments         Provider Department Appt Notes    In 1 week Cassie Cronin MD [address], [place] My blood pressure medication, and need referal for Dr Andrey Sandhoff!     In 1 month LIANA Garduno [facility], [address], [facility]  NON SX F/U               Passed - [facility] or GFRNAA > 50     GFR Evaluation  EGFRCR: 89 , resulted on 9/7/2022             Future Appointments         Provider Department Appt Notes    In 1 week Cat Jacobs MD [address] My blood pressure medication, and need referal for Dr Loida Ambriz!     In 1 month John Bowman, APRN [facility], [address], [facility]  NON SX F/U          Recent Outpatient Visits              2 weeks ago [address], Bhargav Gipson MD    Office Visit    2 months ago Essential hypertension, [facility], [address], Francisco Bloom, LIANA    Office Visit    2 months ago MADELIN on CPAP    [employer], [address] [facility], [facility]    Office Visit    3 months ago Essential hypertension, [facility], [address], Francisco Bloom, LIANA    Telemedicine    4 months ago Dyslipidemia    [address], [address], Francisco Bloom, LIANA    Office Visit

## 2023-03-03 RX ORDER — ALBUTEROL SULFATE 90 UG/1
AEROSOL, METERED RESPIRATORY (INHALATION)
Qty: 8.5 G | Refills: 5 | Status: SHIPPED | OUTPATIENT
Start: 2023-03-03

## 2023-03-03 NOTE — TELEPHONE ENCOUNTER
LOV: 12/20/2022  Last refill: 7/25/2022    Joann Manriquez PA-C-please review and sign pended prescription if agreeable.

## 2023-03-09 ENCOUNTER — OFFICE VISIT (OUTPATIENT)
Dept: FAMILY MEDICINE CLINIC | Facility: CLINIC | Age: 53
End: 2023-03-09

## 2023-03-09 VITALS
BODY MASS INDEX: 43.59 KG/M2 | RESPIRATION RATE: 18 BRPM | DIASTOLIC BLOOD PRESSURE: 72 MMHG | SYSTOLIC BLOOD PRESSURE: 128 MMHG | WEIGHT: 268 LBS | OXYGEN SATURATION: 96 % | HEIGHT: 65.75 IN | HEART RATE: 111 BPM

## 2023-03-09 DIAGNOSIS — L91.8 SKIN TAG: Primary | ICD-10-CM

## 2023-03-09 PROCEDURE — 3008F BODY MASS INDEX DOCD: CPT | Performed by: FAMILY MEDICINE

## 2023-03-09 PROCEDURE — 3074F SYST BP LT 130 MM HG: CPT | Performed by: FAMILY MEDICINE

## 2023-03-09 PROCEDURE — 99213 OFFICE O/P EST LOW 20 MIN: CPT | Performed by: FAMILY MEDICINE

## 2023-03-09 PROCEDURE — 3078F DIAST BP <80 MM HG: CPT | Performed by: FAMILY MEDICINE

## 2023-03-17 ENCOUNTER — OFFICE VISIT (OUTPATIENT)
Dept: DERMATOLOGY CLINIC | Facility: CLINIC | Age: 53
End: 2023-03-17

## 2023-03-17 ENCOUNTER — TELEPHONE (OUTPATIENT)
Facility: CLINIC | Age: 53
End: 2023-03-17

## 2023-03-17 DIAGNOSIS — L70.0 ACNE VULGARIS: ICD-10-CM

## 2023-03-17 DIAGNOSIS — L73.9 FOLLICULITIS: ICD-10-CM

## 2023-03-17 DIAGNOSIS — K74.00 HEPATIC FIBROSIS: ICD-10-CM

## 2023-03-17 DIAGNOSIS — R74.8 ELEVATED LIVER ENZYMES: Primary | ICD-10-CM

## 2023-03-17 DIAGNOSIS — L91.8 SKIN TAG: Primary | ICD-10-CM

## 2023-03-17 RX ORDER — DOXYCYCLINE HYCLATE 100 MG/1
100 CAPSULE ORAL 2 TIMES DAILY
Qty: 60 CAPSULE | Refills: 1 | Status: SHIPPED | OUTPATIENT
Start: 2023-03-17

## 2023-03-17 NOTE — TELEPHONE ENCOUNTER
Dr. Adam Francisco    Patient due for 6 month recall US liver and labs. Please place orders and we can notify the patient.     Thank you

## 2023-03-17 NOTE — PROCEDURES
Procedure:  Skin tag removal  With the patient is a supine position, the skin was scrubbed with alcohol. Anesthesia was obtained by injecting 2 mL of 1% Xylocaine with Epinephrine around the neck, under left arm, and left inner thigh. Scissors and forceps were used to remove the skin tags. Skin tags were not sent for histopathology. Hemostasis achieved with cautery. The estimated blood loss was < 1mL. Clinical Dx & Size of lesion(s):    Skin tags were about 3-4 mm in size. Total of 6 were removed. Robert tolerated the procedure well.   Complications:  none

## 2023-03-20 NOTE — TELEPHONE ENCOUNTER
Called the patient,  and full name verified. I informed him of repeat US of the liver and labs. Provided central scheduling number. Her verbalized understanding of the info given by saying \"I will call to schedule\".

## 2023-04-01 RX ORDER — ESCITALOPRAM OXALATE 20 MG/1
20 TABLET ORAL DAILY
Qty: 90 TABLET | Refills: 1 | Status: SHIPPED | OUTPATIENT
Start: 2023-04-01

## 2023-04-01 NOTE — TELEPHONE ENCOUNTER
Refill passed per [facility] protocol.     Requested Prescriptions   Pending Prescriptions Disp Refills    ESCITALOPRAM 20 MG Oral Tab [Pharmacy Med Name: ESCITALOPRAM 20MG TABLETS] 90 tablet 1     Sig: TAKE 1 TABLET BY MOUTH DAILY       Psychiatric Non-Scheduled (Anti-Anxiety) Passed - 4/1/2023  8:02 AM        Passed - In person appointment or virtual visit in the past 6 mos or appointment in next 3 mos     Recent Outpatient Visits              2 weeks ago Skin tag    [address], [facility] [place]    Office Visit    3 weeks ago Skin tag    [address], [facility]    Office Visit    1 month ago [address], [facility]    Office Visit    3 months ago Essential hypertension, benign    [facility], [address], Jhoana Bloom, LIANA    Office Visit    3 months ago MADELIN on CPAP    [facility], [address] [place] [address]    Office Visit          Future Appointments         Provider Department Appt Notes    In 1 week Kapil King 19 all lab orders    In 1 week Liset 63 Outpatient Visits              2 weeks ago Skin tag    [address], [facility] [place]    Office Visit    3 weeks ago Skin tag    Ham Carcamo MD    Office Visit    1 month ago Maite Tinajero MD    Office Visit    3 months ago Essential hypertension, benign    [facility], [address], Jhoana Bloom, APRN    Office Visit    3 months ago MADELIN on CPAP    [address], [address] [facility] Karuna Chawla    Office Visit          Future Appointments         Provider Department Appt Notes    In 1 week Kapil King 19 all lab orders    In 1 week [address]

## 2023-04-10 ENCOUNTER — HOSPITAL ENCOUNTER (OUTPATIENT)
Dept: ULTRASOUND IMAGING | Age: 53
Discharge: HOME | End: 2023-04-10
Attending: INTERNAL MEDICINE
Payer: MEDICAID

## 2023-04-10 ENCOUNTER — TELEPHONE (OUTPATIENT)
Facility: CLINIC | Age: 53
End: 2023-04-10

## 2023-04-10 ENCOUNTER — PATIENT MESSAGE (OUTPATIENT)
Facility: CLINIC | Age: 53
End: 2023-04-10

## 2023-04-10 ENCOUNTER — LAB ENCOUNTER (OUTPATIENT)
Dept: LAB | Age: 53
End: 2023-04-10
Attending: INTERNAL MEDICINE
Payer: MEDICAID

## 2023-04-10 DIAGNOSIS — E88.81 INSULIN RESISTANCE: ICD-10-CM

## 2023-04-10 DIAGNOSIS — Z99.89 OSA ON CPAP: ICD-10-CM

## 2023-04-10 DIAGNOSIS — R74.8 ELEVATED LIVER ENZYMES: Primary | ICD-10-CM

## 2023-04-10 DIAGNOSIS — K74.00 HEPATIC FIBROSIS: ICD-10-CM

## 2023-04-10 DIAGNOSIS — I10 ESSENTIAL HYPERTENSION, BENIGN: Primary | ICD-10-CM

## 2023-04-10 DIAGNOSIS — E78.5 DYSLIPIDEMIA: ICD-10-CM

## 2023-04-10 DIAGNOSIS — K75.81 NASH (NONALCOHOLIC STEATOHEPATITIS): ICD-10-CM

## 2023-04-10 DIAGNOSIS — G47.33 OSA ON CPAP: ICD-10-CM

## 2023-04-10 DIAGNOSIS — E66.01 MORBID OBESITY WITH BMI OF 40.0-44.9, ADULT (HCC): ICD-10-CM

## 2023-04-10 LAB
AFP-TM SERPL-MCNC: 3.4 NG/ML (ref ?–8)
ALBUMIN SERPL-MCNC: 3.9 G/DL (ref 3.4–5)
ALBUMIN/GLOB SERPL: 1.1 {RATIO} (ref 1–2)
ALP LIVER SERPL-CCNC: 92 U/L
ALT SERPL-CCNC: 222 U/L
ANION GAP SERPL CALC-SCNC: 6 MMOL/L (ref 0–18)
AST SERPL-CCNC: 109 U/L (ref 15–37)
BASOPHILS # BLD AUTO: 0.11 X10(3) UL (ref 0–0.2)
BASOPHILS NFR BLD AUTO: 1.4 %
BILIRUB SERPL-MCNC: 0.5 MG/DL (ref 0.1–2)
BUN BLD-MCNC: 21 MG/DL (ref 7–18)
BUN/CREAT SERPL: 20.8 (ref 10–20)
CALCIUM BLD-MCNC: 9.7 MG/DL (ref 8.5–10.1)
CHLORIDE SERPL-SCNC: 107 MMOL/L (ref 98–112)
CO2 SERPL-SCNC: 25 MMOL/L (ref 21–32)
CREAT BLD-MCNC: 1.01 MG/DL
DEPRECATED RDW RBC AUTO: 45.8 FL (ref 35.1–46.3)
EOSINOPHIL # BLD AUTO: 0.27 X10(3) UL (ref 0–0.7)
EOSINOPHIL NFR BLD AUTO: 3.6 %
ERYTHROCYTE [DISTWIDTH] IN BLOOD BY AUTOMATED COUNT: 13 % (ref 11–15)
FASTING STATUS PATIENT QL REPORTED: YES
GFR SERPLBLD BASED ON 1.73 SQ M-ARVRAT: 88 ML/MIN/1.73M2 (ref 60–?)
GLOBULIN PLAS-MCNC: 3.6 G/DL (ref 2.8–4.4)
GLUCOSE BLD-MCNC: 116 MG/DL (ref 70–99)
HCT VFR BLD AUTO: 49.5 %
HGB BLD-MCNC: 16.6 G/DL
IMM GRANULOCYTES # BLD AUTO: 0.02 X10(3) UL (ref 0–1)
IMM GRANULOCYTES NFR BLD: 0.3 %
INR BLD: 0.93 (ref 0.85–1.16)
LYMPHOCYTES # BLD AUTO: 2.15 X10(3) UL (ref 1–4)
LYMPHOCYTES NFR BLD AUTO: 28.3 %
MCH RBC QN AUTO: 31.7 PG (ref 26–34)
MCHC RBC AUTO-ENTMCNC: 33.5 G/DL (ref 31–37)
MCV RBC AUTO: 94.6 FL
MONOCYTES # BLD AUTO: 0.64 X10(3) UL (ref 0.1–1)
MONOCYTES NFR BLD AUTO: 8.4 %
NEUTROPHILS # BLD AUTO: 4.4 X10 (3) UL (ref 1.5–7.7)
NEUTROPHILS # BLD AUTO: 4.4 X10(3) UL (ref 1.5–7.7)
NEUTROPHILS NFR BLD AUTO: 58 %
OSMOLALITY SERPL CALC.SUM OF ELEC: 290 MOSM/KG (ref 275–295)
PLATELET # BLD AUTO: 174 10(3)UL (ref 150–450)
POTASSIUM SERPL-SCNC: 4.3 MMOL/L (ref 3.5–5.1)
PROT SERPL-MCNC: 7.5 G/DL (ref 6.4–8.2)
PROTHROMBIN TIME: 12.5 SECONDS (ref 11.6–14.8)
RBC # BLD AUTO: 5.23 X10(6)UL
SODIUM SERPL-SCNC: 138 MMOL/L (ref 136–145)
WBC # BLD AUTO: 7.6 X10(3) UL (ref 4–11)

## 2023-04-10 PROCEDURE — 36415 COLL VENOUS BLD VENIPUNCTURE: CPT

## 2023-04-10 PROCEDURE — 85025 COMPLETE CBC W/AUTO DIFF WBC: CPT | Performed by: INTERNAL MEDICINE

## 2023-04-10 PROCEDURE — 85610 PROTHROMBIN TIME: CPT

## 2023-04-10 PROCEDURE — 76705 ECHO EXAM OF ABDOMEN: CPT | Performed by: INTERNAL MEDICINE

## 2023-04-10 PROCEDURE — 82105 ALPHA-FETOPROTEIN SERUM: CPT

## 2023-04-10 PROCEDURE — 80053 COMPREHEN METABOLIC PANEL: CPT

## 2023-04-10 NOTE — TELEPHONE ENCOUNTER
----- Message from Charissa Ventura MD sent at 4/10/2023 10:42 AM CDT -----  Pravin Rodriguez,    Your liver ultrasound reveals the fatty liver as anticipated. There are no visible tumors. Great news! I will let you know the results of the blood tests when available. The ultrasound should be repeated in 6 months. Talk to you soon. Dr. Beatriz Bey RNs: Please enter liver ultrasound recall for 6 months.

## 2023-04-10 NOTE — TELEPHONE ENCOUNTER
From: Dayton Finn  To: Anaya Vernon MD  Sent: 4/10/2023 2:16 PM CDT  Subject: Question regarding US LIVER (CPT=76705)    So is this result good or bad?  Thanks Gustavo Polanco

## 2023-04-10 NOTE — TELEPHONE ENCOUNTER
From: Christiano Cheek  To: Charissa Ventura MD  Sent: 4/10/2023 5:58 PM CDT  Subject: Question regarding COMP METABOLIC PANEL (14)    What does this mean!

## 2023-04-11 NOTE — TELEPHONE ENCOUNTER
I spoke to Al. His transaminases remain elevated although not as high as September. He was faithful with his diet which degraded over the holidays. Unfortunately vertigo continues to be an issue limiting exercise. He is seeing LIANA Baez at weight management. I will reach out to [facility] regarding more aggressive efforts at weight loss. I would like the patient to repeat his liver chemistries in 6-8 weeks. I have again offered evaluation at a tertiary care center for any investigational therapy. Al states he is reluctant to undergo any investigational therapies, however, if his subsequent liver enzymes have not improved I would strongly recommend at least an opinion from a tertiary care hepatologist regarding management. Sharon Begun liver chemistries remain significantly elevated. Weight loss is paramount to avoid progression of his liver disease. This has been hampered as you know by vertigo and inability to exercise. Would he be a candidate for pharmacologic therapy such as a GLP-1 agonist (or other pharmacologic therapies)?

## 2023-04-12 ENCOUNTER — LAB ENCOUNTER (OUTPATIENT)
Dept: LAB | Age: 53
End: 2023-04-12
Attending: NURSE PRACTITIONER
Payer: MEDICAID

## 2023-04-12 ENCOUNTER — TELEPHONE (OUTPATIENT)
Facility: CLINIC | Age: 53
End: 2023-04-12

## 2023-04-12 DIAGNOSIS — G47.33 OSA ON CPAP: ICD-10-CM

## 2023-04-12 DIAGNOSIS — K75.81 NASH (NONALCOHOLIC STEATOHEPATITIS): ICD-10-CM

## 2023-04-12 DIAGNOSIS — I10 ESSENTIAL HYPERTENSION, BENIGN: ICD-10-CM

## 2023-04-12 DIAGNOSIS — E66.01 MORBID OBESITY WITH BMI OF 40.0-44.9, ADULT (HCC): ICD-10-CM

## 2023-04-12 DIAGNOSIS — Z99.89 OSA ON CPAP: ICD-10-CM

## 2023-04-12 DIAGNOSIS — R74.8 ELEVATED LIVER ENZYMES: ICD-10-CM

## 2023-04-12 DIAGNOSIS — E88.81 INSULIN RESISTANCE: ICD-10-CM

## 2023-04-12 DIAGNOSIS — E78.5 DYSLIPIDEMIA: ICD-10-CM

## 2023-04-12 LAB
ALBUMIN SERPL-MCNC: 3.9 G/DL (ref 3.4–5)
ALP LIVER SERPL-CCNC: 88 U/L
ALT SERPL-CCNC: 172 U/L
AST SERPL-CCNC: 67 U/L (ref 15–37)
BILIRUB DIRECT SERPL-MCNC: 0.2 MG/DL (ref 0–0.2)
BILIRUB SERPL-MCNC: 0.6 MG/DL (ref 0.1–2)
EST. AVERAGE GLUCOSE BLD GHB EST-MCNC: 123 MG/DL (ref 68–126)
HBA1C MFR BLD: 5.9 % (ref ?–5.7)
INSULIN SERPL-ACNC: 18.5 MU/L (ref 3–25)
PROT SERPL-MCNC: 7.2 G/DL (ref 6.4–8.2)

## 2023-04-12 PROCEDURE — 80076 HEPATIC FUNCTION PANEL: CPT

## 2023-04-12 PROCEDURE — 36415 COLL VENOUS BLD VENIPUNCTURE: CPT

## 2023-04-12 PROCEDURE — 83525 ASSAY OF INSULIN: CPT

## 2023-04-12 PROCEDURE — 83036 HEMOGLOBIN GLYCOSYLATED A1C: CPT

## 2023-04-13 ENCOUNTER — OFFICE VISIT (OUTPATIENT)
Dept: SURGERY | Facility: CLINIC | Age: 53
End: 2023-04-13
Payer: MEDICAID

## 2023-04-13 VITALS
BODY MASS INDEX: 43.91 KG/M2 | DIASTOLIC BLOOD PRESSURE: 70 MMHG | SYSTOLIC BLOOD PRESSURE: 138 MMHG | HEART RATE: 71 BPM | OXYGEN SATURATION: 94 % | HEIGHT: 65.75 IN | WEIGHT: 270 LBS

## 2023-04-13 DIAGNOSIS — K76.0 HEPATIC STEATOSIS: ICD-10-CM

## 2023-04-13 DIAGNOSIS — J44.9 CHRONIC OBSTRUCTIVE PULMONARY DISEASE, UNSPECIFIED COPD TYPE (HCC): ICD-10-CM

## 2023-04-13 DIAGNOSIS — E78.5 DYSLIPIDEMIA: Primary | ICD-10-CM

## 2023-04-13 DIAGNOSIS — R73.03 PREDIABETES: ICD-10-CM

## 2023-04-13 DIAGNOSIS — I51.9 HEART DISEASE: ICD-10-CM

## 2023-04-13 DIAGNOSIS — Z99.89 OSA ON CPAP: ICD-10-CM

## 2023-04-13 DIAGNOSIS — I10 ESSENTIAL HYPERTENSION, BENIGN: ICD-10-CM

## 2023-04-13 DIAGNOSIS — G47.33 OSA ON CPAP: ICD-10-CM

## 2023-04-13 DIAGNOSIS — E66.01 MORBID OBESITY WITH BMI OF 40.0-44.9, ADULT (HCC): ICD-10-CM

## 2023-04-13 DIAGNOSIS — E88.81 INSULIN RESISTANCE: ICD-10-CM

## 2023-04-13 DIAGNOSIS — R74.8 ELEVATED LIVER ENZYMES: Primary | ICD-10-CM

## 2023-04-13 PROBLEM — E88.819 INSULIN RESISTANCE: Status: ACTIVE | Noted: 2023-04-13

## 2023-04-13 PROCEDURE — 3075F SYST BP GE 130 - 139MM HG: CPT | Performed by: NURSE PRACTITIONER

## 2023-04-13 PROCEDURE — 99214 OFFICE O/P EST MOD 30 MIN: CPT | Performed by: NURSE PRACTITIONER

## 2023-04-13 PROCEDURE — 3078F DIAST BP <80 MM HG: CPT | Performed by: NURSE PRACTITIONER

## 2023-04-13 PROCEDURE — 3008F BODY MASS INDEX DOCD: CPT | Performed by: NURSE PRACTITIONER

## 2023-04-13 RX ORDER — DULAGLUTIDE 0.75 MG/.5ML
0.75 INJECTION, SOLUTION SUBCUTANEOUS WEEKLY
Qty: 2 ML | Refills: 2 | Status: SHIPPED | OUTPATIENT
Start: 2023-04-13

## 2023-04-13 NOTE — PATIENT INSTRUCTIONS
1 MD probiotic   Fit Biotic. Garden  Life Fit Protein shake (Vanilla). SLEEVE GASTRECTOMY  80% of patients reach at least 211 lbs 18 months after surgery. 50% of patients reach at least 192 lbs 18 months after surgery. 20% of patients reach 173 lbs 18 months after surgery.

## 2023-04-24 ENCOUNTER — OFFICE VISIT (OUTPATIENT)
Dept: DERMATOLOGY CLINIC | Facility: CLINIC | Age: 53
End: 2023-04-24

## 2023-04-24 DIAGNOSIS — L72.9 CYST OF SKIN: Primary | ICD-10-CM

## 2023-04-24 DIAGNOSIS — L91.8 SKIN TAG: ICD-10-CM

## 2023-04-25 ENCOUNTER — OFFICE VISIT (OUTPATIENT)
Dept: SURGERY | Facility: CLINIC | Age: 53
End: 2023-04-25

## 2023-04-25 VITALS — HEIGHT: 65.75 IN | WEIGHT: 270 LBS | BODY MASS INDEX: 43.91 KG/M2

## 2023-04-25 DIAGNOSIS — L72.0 EPIDERMAL INCLUSION CYST: Primary | ICD-10-CM

## 2023-04-25 PROCEDURE — 99203 OFFICE O/P NEW LOW 30 MIN: CPT | Performed by: SURGERY

## 2023-04-25 PROCEDURE — 3008F BODY MASS INDEX DOCD: CPT | Performed by: SURGERY

## 2023-04-26 ENCOUNTER — TELEPHONE (OUTPATIENT)
Dept: SURGERY | Facility: CLINIC | Age: 53
End: 2023-04-26

## 2023-04-26 DIAGNOSIS — L72.0 EPIDERMOID CYST OF SKIN OF THIGH: Primary | ICD-10-CM

## 2023-04-28 NOTE — TELEPHONE ENCOUNTER
Patient states lump is getting worse and causing pain and bleeding at this point. Would like to see if surgery can get reschedule to something sooner. Currently scheduled for 05/31.  Please advise

## 2023-04-30 ENCOUNTER — HOSPITAL ENCOUNTER (INPATIENT)
Facility: HOSPITAL | Age: 53
LOS: 1 days | Discharge: HOME | End: 2023-05-01
Attending: EMERGENCY MEDICINE | Admitting: HOSPITALIST
Payer: MEDICAID

## 2023-04-30 ENCOUNTER — APPOINTMENT (OUTPATIENT)
Dept: CT IMAGING | Facility: HOSPITAL | Age: 53
End: 2023-04-30
Attending: EMERGENCY MEDICINE
Payer: MEDICAID

## 2023-04-30 DIAGNOSIS — L02.419 CELLULITIS AND ABSCESS OF LEG: Primary | ICD-10-CM

## 2023-04-30 DIAGNOSIS — Z78.9 FAILURE OF OUTPATIENT TREATMENT: ICD-10-CM

## 2023-04-30 DIAGNOSIS — L03.119 CELLULITIS AND ABSCESS OF LEG: Primary | ICD-10-CM

## 2023-04-30 LAB
ANION GAP SERPL CALC-SCNC: 7 MMOL/L (ref 0–18)
BASOPHILS # BLD AUTO: 0.09 X10(3) UL (ref 0–0.2)
BASOPHILS NFR BLD AUTO: 0.7 %
BUN BLD-MCNC: 19 MG/DL (ref 7–18)
BUN/CREAT SERPL: 20.4 (ref 10–20)
CALCIUM BLD-MCNC: 10.1 MG/DL (ref 8.5–10.1)
CHLORIDE SERPL-SCNC: 101 MMOL/L (ref 98–112)
CO2 SERPL-SCNC: 30 MMOL/L (ref 21–32)
CREAT BLD-MCNC: 0.93 MG/DL
DEPRECATED RDW RBC AUTO: 44.5 FL (ref 35.1–46.3)
EOSINOPHIL # BLD AUTO: 0.19 X10(3) UL (ref 0–0.7)
EOSINOPHIL NFR BLD AUTO: 1.5 %
ERYTHROCYTE [DISTWIDTH] IN BLOOD BY AUTOMATED COUNT: 13.1 % (ref 11–15)
GFR SERPLBLD BASED ON 1.73 SQ M-ARVRAT: 97 ML/MIN/1.73M2 (ref 60–?)
GLUCOSE BLD-MCNC: 103 MG/DL (ref 70–99)
GLUCOSE BLDC GLUCOMTR-MCNC: 132 MG/DL (ref 70–99)
HCT VFR BLD AUTO: 47 %
HGB BLD-MCNC: 15.9 G/DL
IMM GRANULOCYTES # BLD AUTO: 0.05 X10(3) UL (ref 0–1)
IMM GRANULOCYTES NFR BLD: 0.4 %
LYMPHOCYTES # BLD AUTO: 2.08 X10(3) UL (ref 1–4)
LYMPHOCYTES NFR BLD AUTO: 17 %
MCH RBC QN AUTO: 31.7 PG (ref 26–34)
MCHC RBC AUTO-ENTMCNC: 33.8 G/DL (ref 31–37)
MCV RBC AUTO: 93.6 FL
MONOCYTES # BLD AUTO: 1.35 X10(3) UL (ref 0.1–1)
MONOCYTES NFR BLD AUTO: 11 %
NEUTROPHILS # BLD AUTO: 8.51 X10 (3) UL (ref 1.5–7.7)
NEUTROPHILS # BLD AUTO: 8.51 X10(3) UL (ref 1.5–7.7)
NEUTROPHILS NFR BLD AUTO: 69.4 %
OSMOLALITY SERPL CALC.SUM OF ELEC: 289 MOSM/KG (ref 275–295)
PLATELET # BLD AUTO: 188 10(3)UL (ref 150–450)
POTASSIUM SERPL-SCNC: 4.4 MMOL/L (ref 3.5–5.1)
RBC # BLD AUTO: 5.02 X10(6)UL
SODIUM SERPL-SCNC: 138 MMOL/L (ref 136–145)
WBC # BLD AUTO: 12.3 X10(3) UL (ref 4–11)

## 2023-04-30 PROCEDURE — 0H9HXZZ DRAINAGE OF RIGHT UPPER LEG SKIN, EXTERNAL APPROACH: ICD-10-PCS | Performed by: EMERGENCY MEDICINE

## 2023-04-30 PROCEDURE — 72193 CT PELVIS W/DYE: CPT | Performed by: EMERGENCY MEDICINE

## 2023-04-30 RX ORDER — PANTOPRAZOLE SODIUM 40 MG/1
40 TABLET, DELAYED RELEASE ORAL
Status: DISCONTINUED | OUTPATIENT
Start: 2023-05-01 | End: 2023-05-01

## 2023-04-30 RX ORDER — HEPARIN SODIUM 5000 [USP'U]/ML
5000 INJECTION, SOLUTION INTRAVENOUS; SUBCUTANEOUS EVERY 12 HOURS SCHEDULED
Status: DISCONTINUED | OUTPATIENT
Start: 2023-05-01 | End: 2023-05-01

## 2023-04-30 RX ORDER — BUDESONIDE 3 MG/1
3 CAPSULE, COATED PELLETS ORAL EVERY MORNING
COMMUNITY

## 2023-04-30 RX ORDER — METOPROLOL TARTRATE 50 MG/1
50 TABLET, FILM COATED ORAL 2 TIMES DAILY
Status: DISCONTINUED | OUTPATIENT
Start: 2023-04-30 | End: 2023-05-01

## 2023-04-30 RX ORDER — HYDRALAZINE HYDROCHLORIDE 20 MG/ML
10 INJECTION INTRAMUSCULAR; INTRAVENOUS EVERY 4 HOURS PRN
Status: DISCONTINUED | OUTPATIENT
Start: 2023-04-30 | End: 2023-05-01

## 2023-04-30 RX ORDER — IPRATROPIUM BROMIDE AND ALBUTEROL SULFATE 2.5; .5 MG/3ML; MG/3ML
3 SOLUTION RESPIRATORY (INHALATION) EVERY 6 HOURS PRN
Status: DISCONTINUED | OUTPATIENT
Start: 2023-04-30 | End: 2023-05-01

## 2023-04-30 RX ORDER — ASPIRIN 81 MG/1
81 TABLET ORAL DAILY
Status: DISCONTINUED | OUTPATIENT
Start: 2023-04-30 | End: 2023-05-01

## 2023-04-30 RX ORDER — ALBUTEROL SULFATE 90 UG/1
2 AEROSOL, METERED RESPIRATORY (INHALATION) EVERY 6 HOURS PRN
Status: DISCONTINUED | OUTPATIENT
Start: 2023-04-30 | End: 2023-05-01

## 2023-04-30 RX ORDER — MAGNESIUM HYDROXIDE/ALUMINUM HYDROXICE/SIMETHICONE 120; 1200; 1200 MG/30ML; MG/30ML; MG/30ML
30 SUSPENSION ORAL 4 TIMES DAILY PRN
Status: DISCONTINUED | OUTPATIENT
Start: 2023-04-30 | End: 2023-05-01

## 2023-04-30 RX ORDER — ZOLPIDEM TARTRATE 5 MG/1
5 TABLET ORAL NIGHTLY PRN
Status: DISCONTINUED | OUTPATIENT
Start: 2023-04-30 | End: 2023-05-01

## 2023-04-30 RX ORDER — ONDANSETRON 2 MG/ML
4 INJECTION INTRAMUSCULAR; INTRAVENOUS EVERY 6 HOURS PRN
Status: DISCONTINUED | OUTPATIENT
Start: 2023-04-30 | End: 2023-05-01

## 2023-04-30 RX ORDER — NICOTINE POLACRILEX 4 MG
30 LOZENGE BUCCAL
Status: DISCONTINUED | OUTPATIENT
Start: 2023-04-30 | End: 2023-05-01

## 2023-04-30 RX ORDER — ACETAMINOPHEN 325 MG/1
650 TABLET ORAL EVERY 6 HOURS PRN
Status: DISCONTINUED | OUTPATIENT
Start: 2023-04-30 | End: 2023-05-01

## 2023-04-30 RX ORDER — DOXYCYCLINE HYCLATE 100 MG/1
100 CAPSULE ORAL 2 TIMES DAILY
COMMUNITY

## 2023-04-30 RX ORDER — NICOTINE POLACRILEX 4 MG
15 LOZENGE BUCCAL
Status: DISCONTINUED | OUTPATIENT
Start: 2023-04-30 | End: 2023-05-01

## 2023-04-30 RX ORDER — ACETAMINOPHEN AND CODEINE PHOSPHATE 300; 30 MG/1; MG/1
1 TABLET ORAL EVERY 4 HOURS PRN
Status: DISCONTINUED | OUTPATIENT
Start: 2023-04-30 | End: 2023-05-01

## 2023-04-30 RX ORDER — VANCOMYCIN 1.75 GRAM/500 ML IN 0.9 % SODIUM CHLORIDE INTRAVENOUS
15 ONCE
Status: COMPLETED | OUTPATIENT
Start: 2023-04-30 | End: 2023-04-30

## 2023-04-30 RX ORDER — ALPRAZOLAM 0.5 MG/1
1 TABLET ORAL NIGHTLY PRN
Status: DISCONTINUED | OUTPATIENT
Start: 2023-04-30 | End: 2023-05-01

## 2023-04-30 RX ORDER — DEXTROSE MONOHYDRATE 25 G/50ML
50 INJECTION, SOLUTION INTRAVENOUS
Status: DISCONTINUED | OUTPATIENT
Start: 2023-04-30 | End: 2023-05-01

## 2023-04-30 RX ORDER — ESCITALOPRAM OXALATE 20 MG/1
20 TABLET ORAL DAILY
Status: DISCONTINUED | OUTPATIENT
Start: 2023-04-30 | End: 2023-05-01

## 2023-04-30 RX ORDER — AMLODIPINE BESYLATE 5 MG/1
5 TABLET ORAL DAILY
Status: DISCONTINUED | OUTPATIENT
Start: 2023-04-30 | End: 2023-05-01

## 2023-04-30 RX ORDER — LIDOCAINE HYDROCHLORIDE AND EPINEPHRINE 20; 5 MG/ML; UG/ML
10 INJECTION, SOLUTION EPIDURAL; INFILTRATION; INTRACAUDAL; PERINEURAL ONCE
Status: COMPLETED | OUTPATIENT
Start: 2023-04-30 | End: 2023-04-30

## 2023-04-30 RX ORDER — VANCOMYCIN HYDROCHLORIDE
10 EVERY 12 HOURS
Status: DISCONTINUED | OUTPATIENT
Start: 2023-05-01 | End: 2023-05-01

## 2023-04-30 NOTE — ED QUICK NOTES
Orders for admission, patient is aware of plan and ready to go upstairs. Any questions, please call ED RN Jayde at extension [phone]. Patient Covid vaccination status: Fully vaccinated     COVID Test Ordered in ED: None    COVID Suspicion at Admission: N/A    Running Infusions:      Mental Status/LOC at time of transport: Alert    Other pertinent information:   A&Ox4, ambulatory, independent. Right thigh dressing in place, reinforce as needed.

## 2023-04-30 NOTE — ED INITIAL ASSESSMENT (HPI)
Patient from home with c/o a \"softball size cyst\" to his right groin that started as a skin tag 2 weeks ago. Seen in the office approximately 1 week ago, started abtx for 3 days then stopped them and restarted yesterday. Patient is concerned the cyst has tripled in size in the past 2 days. Denies fever. States he has \"not been feeling good since yesterday. \"

## 2023-05-01 VITALS
HEIGHT: 67 IN | OXYGEN SATURATION: 100 % | WEIGHT: 263.81 LBS | BODY MASS INDEX: 41.4 KG/M2 | HEART RATE: 69 BPM | RESPIRATION RATE: 20 BRPM | DIASTOLIC BLOOD PRESSURE: 79 MMHG | SYSTOLIC BLOOD PRESSURE: 144 MMHG | TEMPERATURE: 98 F

## 2023-05-01 LAB
ANION GAP SERPL CALC-SCNC: 6 MMOL/L (ref 0–18)
BASOPHILS # BLD AUTO: 0.07 X10(3) UL (ref 0–0.2)
BASOPHILS NFR BLD AUTO: 1 %
BUN BLD-MCNC: 13 MG/DL (ref 7–18)
BUN/CREAT SERPL: 13.4 (ref 10–20)
C DIFF TOX B STL QL: NEGATIVE
CALCIUM BLD-MCNC: 9.2 MG/DL (ref 8.5–10.1)
CHLORIDE SERPL-SCNC: 108 MMOL/L (ref 98–112)
CO2 SERPL-SCNC: 27 MMOL/L (ref 21–32)
CREAT BLD-MCNC: 0.97 MG/DL
DEPRECATED RDW RBC AUTO: 46.9 FL (ref 35.1–46.3)
EOSINOPHIL # BLD AUTO: 0.2 X10(3) UL (ref 0–0.7)
EOSINOPHIL NFR BLD AUTO: 2.9 %
ERYTHROCYTE [DISTWIDTH] IN BLOOD BY AUTOMATED COUNT: 13.2 % (ref 11–15)
GFR SERPLBLD BASED ON 1.73 SQ M-ARVRAT: 92 ML/MIN/1.73M2 (ref 60–?)
GLUCOSE BLD-MCNC: 101 MG/DL (ref 70–99)
GLUCOSE BLDC GLUCOMTR-MCNC: 108 MG/DL (ref 70–99)
HCT VFR BLD AUTO: 44.1 %
HGB BLD-MCNC: 14.5 G/DL
IMM GRANULOCYTES # BLD AUTO: 0.02 X10(3) UL (ref 0–1)
IMM GRANULOCYTES NFR BLD: 0.3 %
LYMPHOCYTES # BLD AUTO: 1.5 X10(3) UL (ref 1–4)
LYMPHOCYTES NFR BLD AUTO: 21.7 %
MCH RBC QN AUTO: 31.7 PG (ref 26–34)
MCHC RBC AUTO-ENTMCNC: 32.9 G/DL (ref 31–37)
MCV RBC AUTO: 96.3 FL
MONOCYTES # BLD AUTO: 0.68 X10(3) UL (ref 0.1–1)
MONOCYTES NFR BLD AUTO: 9.8 %
NEUTROPHILS # BLD AUTO: 4.45 X10 (3) UL (ref 1.5–7.7)
NEUTROPHILS # BLD AUTO: 4.45 X10(3) UL (ref 1.5–7.7)
NEUTROPHILS NFR BLD AUTO: 64.3 %
OSMOLALITY SERPL CALC.SUM OF ELEC: 292 MOSM/KG (ref 275–295)
PLATELET # BLD AUTO: 146 10(3)UL (ref 150–450)
POTASSIUM SERPL-SCNC: 3.7 MMOL/L (ref 3.5–5.1)
RBC # BLD AUTO: 4.58 X10(6)UL
SODIUM SERPL-SCNC: 141 MMOL/L (ref 136–145)
WBC # BLD AUTO: 6.9 X10(3) UL (ref 4–11)

## 2023-05-01 PROCEDURE — 99239 HOSP IP/OBS DSCHRG MGMT >30: CPT | Performed by: HOSPITALIST

## 2023-05-01 PROCEDURE — 99252 IP/OBS CONSLTJ NEW/EST SF 35: CPT | Performed by: SURGERY

## 2023-05-01 RX ORDER — AMOXICILLIN AND CLAVULANATE POTASSIUM 875; 125 MG/1; MG/1
1 TABLET, FILM COATED ORAL 2 TIMES DAILY
Qty: 20 TABLET | Refills: 0 | Status: SHIPPED | OUTPATIENT
Start: 2023-05-01 | End: 2023-05-11

## 2023-05-01 NOTE — PLAN OF CARE
Problem: Patient Centered Care  Goal: Patient preferences are identified and integrated in the patient's plan of care  Description: Interventions:  - What would you like us to know as we care for you?  I would like to go home now and I do not wish to   - Provide timely, complete, and accurate information to patient/family  - Incorporate patient and family knowledge, values, beliefs, and cultural backgrounds into the planning and delivery of care  - Encourage patient/family to participate in care and decision-making at the level they choose  - Honor patient and family perspectives and choices  Outcome: Adequate for Discharge     Problem: Patient/Family Goals  Goal: Patient/Family Long Term Goal  Description: Patient's Long Term Goal:     Interventions:  -  - See additional Care Plan goals for specific interventions  Outcome: Adequate for Discharge  Goal: Patient/Family Short Term Goal  Description: Patient's Short Term Goal:     Interventions:   -   - See additional Care Plan goals for specific interventions  Outcome: Adequate for Discharge     Problem: PAIN - ADULT  Goal: Verbalizes/displays adequate comfort level or patient's stated pain goal  Description: INTERVENTIONS:  - Encourage pt to monitor pain and request assistance  - Assess pain using appropriate pain scale  - Administer analgesics based on type and severity of pain and evaluate response  - Implement non-pharmacological measures as appropriate and evaluate response  - Consider cultural and social influences on pain and pain management  - Manage/alleviate anxiety  - Utilize distraction and/or relaxation techniques  - Monitor for opioid side effects  - Notify MD/LIP if interventions unsuccessful or patient reports new pain  - Anticipate increased pain with activity and pre-medicate as appropriate  Outcome: Adequate for Discharge     Problem: RISK FOR INFECTION - ADULT  Goal: Absence of fever/infection during anticipated neutropenic period  Description: INTERVENTIONS  - Monitor WBC  - Administer growth factors as ordered  - Implement neutropenic guidelines  Outcome: Adequate for Discharge     Problem: SAFETY ADULT - FALL  Goal: Free from fall injury  Description: INTERVENTIONS:  - Assess pt frequently for physical needs  - Identify cognitive and physical deficits and behaviors that affect risk of falls. - [place] fall precautions as indicated by assessment.  - Educate pt/family on patient safety including physical limitations  - Instruct pt to call for assistance with activity based on assessment  - Modify environment to reduce risk of injury  - Provide assistive devices as appropriate  - Consider OT/PT consult to assist with strengthening/mobility  - Encourage toileting schedule  Outcome: Adequate for Discharge     Problem: DISCHARGE PLANNING  Goal: Discharge to home or other facility with appropriate resources  Description: INTERVENTIONS:  - Identify barriers to discharge w/pt and caregiver  - Include patient/family/discharge partner in discharge planning  - Arrange for needed discharge resources and transportation as appropriate  - Identify discharge learning needs (meds, wound care, etc)  - Arrange for interpreters to assist at discharge as needed  - Consider post-discharge preferences of patient/family/discharge partner  - Complete POLST form as appropriate  - Assess patient's ability to be responsible for managing their own health  - Refer to Case Management Department for coordinating discharge planning if the patient needs post-hospital services based on physician/LIP order or complex needs related to functional status, cognitive ability or social support system  Outcome: Adequate for Discharge   Patient wishes to leave AMA and to follow up as an outpatient with Dr. Juan Balbuena. Dr. Julienne Harley notified. Patient signed AMA form.

## 2023-05-01 NOTE — PAYOR COMM NOTE
--------------  DISCHARGE REVIEW    Payor: Garrett Armas #:  [id number]  Authorization Number: [id number]    Admit date: 23  Admit time:   7:47 PM  Discharge Date: 2023 11:00 AM     Admitting Physician: Sirisha Baig MD  Attending Physician:  No att. providers found  Primary Care Physician: Frank Rizvi MD          Discharge Summary Notes      Discharge Summary signed by Sirisha Baig MD at 2023 11:51 AM     Author: Sirisha Baig MD Specialty: HOSPITALIST Author Type: Physician    Filed: 2023 11:51 AM Date of Service: 2023 11:44 AM Status: Addendum    : Sirisha Baig MD (Physician)    Related Notes: Original Note by Sirisha Baig MD (Physician) filed at 2023 11:50 AM         [facility]    Discharge Summary    Marvel Rutherford Patient Status:  Inpatient    1970 MRN [id number]   Location [facility] 4W/SW/SE Attending No att. providers found   Hosp Day # 1 PCP Rachel Foster MD     Date of Admission: 2023   Date of Discharge: 2023 11:00 AM    Admitting Diagnosis: Cellulitis and abscess of leg [L03.119, L02.419]  Failure of outpatient treatment [Z78.9]    Disposition: [address] [place] [address] Discharge Diagnoses: Abscess of the leg    Lace+ Score: 60  59-90 High Risk  29-58 Medium Risk  0-28   Low Risk. TCM Follow-Up Recommendation:  LACE > 58: High Risk of readmission after discharge from the hospital.      Discharge Diagnosis: . Principal Problem:    Cellulitis and abscess of leg  Active Problems:    Failure of outpatient treatment      Hospital Course:   Reason for Admission:   Per Dr. Pipe Ramos is a(n) 52year old male, with past medical history significant for coronary artery disease, diverticulitis with seeded infection resulting in liver abscess requiring long-term antibiotics and drainage, presents with a complaint of right groin swelling and tenderness that started approximately 2 weeks ago. Initially was a skin tag which was excised, later on developed into a small lump like swelling persisted for approximately a week, was seen by a surgeon and diagnosed as an epidermoid cyst requiring excision however was scheduled approximately a month and a half later today presents with marked worsening in swelling pain and redness. Claims the pea-sized swelling is now size of a tennis ball extremely tender, aggravated by ambulation with no relieving factors. He rates his pain as a 7 out of 10 at its worst nonradiating in nature. Also complains of subjective fevers with chills. Denies any difficulty with bladder or bowel function. Discharge Physical Exam:   Left [facility]    Hospital Course:   Cellulitis and abscess of the right leg  - status post incision and drainage in the ER  - patient did not want to wait for me to prescribe him his oral antibiotics  - I notified Dr. Saucedo Going about this  - he left AMA and signed the form without waiting fo rme   - called in prescription- notified surgery and nursing to let patient know  - Nursing will notify him    DVT proph- heparin    Full code        Complications: none    Consultants       Provider   Role Specialty     Gus Estrada MD  Consulting Physician SURGERY, GENERAL          Pending Labs     Order Current Status    Clostridium difficile(toxigenic)PCR In process    Aerobic Bacterial Culture Preliminary result          Discharge Plan:   Discharge Condition: Stable    Discharge Medication List as of 5/1/2023 11:37 AM    Augmentin 875 mg PO BID for 10 days- 20 tablets- script sent to pharmacy after discharge. Home Meds - Unchanged    budesonide 3 MG Oral Cap DR Particles  Take 1 capsule (3 mg total) by mouth every morning., Historical    doxycycline 100 MG Oral Cap  Take 1 capsule (100 mg total) by mouth 2 (two) times daily. , Historical    Dulaglutide (TRULICITY) 3.00 OY/3.7QT Subcutaneous Solution Pen-injector  Inject 0.75 mg into the skin once a week., Normal, Disp-2 mL, R-2    escitalopram 20 MG Oral Tab  Take 1 tablet (20 mg total) by mouth daily. , Normal, Disp-90 tablet, R-1ZERO refills remain on this prescription. Your patient is requesting advance approval of refills for this medication to PREVENT ANY MISSED DOSES    ALBUTEROL 108 (90 Base) MCG/ACT Inhalation Aero Soln  INHALE 2 PUFFS BY MOUTH EVERY 6 HOURS AS NEEDED FOR SHORTNESS OF BREATH OR WHEEZING, Normal, Disp-8.5 g, R-5    metoprolol tartrate 50 MG Oral Tab  Take 1 tablet (50 mg total) by mouth 2 (two) times daily. , Normal, Disp-180 tablet, R-0ZERO refills remain on this prescription. Your patient is requesting advance approval of refills for this medication to [address] 18 MCG Inhalation Cap  INHALE THE CONTENTS OF 1 CAPSULE VIA INHALATION DEVICE EVERY DAY, Normal, Disp-30 capsule, R-3    WIXELA INHUB 500-50 MCG/ACT Inhalation Aerosol Powder, Breath Activated  INHALE 1 PUFF INTO THE LUNGS TWICE DAILY. RINSE MOUTH, GARGLE, AND SPIT FOLLOWING INHALATION, Normal, Disp-60 each, R-3ZERO refills remain on this prescription. Your patient is requesting advance approval of refills for this medication to PREVENT ANY MI SSED DOSES    amLODIPine 5 MG Oral Tab  Take 1 tablet (5 mg total) by mouth daily. , Normal, Disp-90 tablet, R-1    ALPRAZOLAM 1 MG Oral Tab  TAKE 1 TABLET(1 MG) BY MOUTH EVERY NIGHT AS NEEDED FOR SLEEP, Normal, Disp-15 tablet, R-0    ipratropium-albuterol 0.5-2.5 (3) MG/3ML Inhalation Solution  Take 3 mL by nebulization every 6 (six) hours as needed., Normal, Disp-180 mL, R-2    aspirin 81 MG Oral Tab EC  Take 1 tablet (81 mg total) by mouth daily. , Normal, Disp-90 tablet, R-1    lisinopril 30 MG Oral Tab  Take 1 tablet (30 mg total) by mouth daily. , Normal, Disp-90 tablet, R-1    Ascorbic Acid (VITAMIN C) 1000 MG Oral Tab  Take 1 tablet (1,000 mg total) by mouth daily. , Historical    omega-3 fatty acids 1000 MG Oral Cap  Take 3,000 mg by mouth daily., Historical    Multiple Vitamin (MULTI-VITAMINS) Oral Tab  Take  by mouth., Historical    REPATHA SURECLICK 124 MG/ML Subcutaneous Solution Auto-injector  Historical, KELSI              Discharge Diet: Cardiac     Discharge Activity: As tolerated       Discharge Medications      CONTINUE taking these medications      Instructions Prescription details   albuterol 108 (90 Base) MCG/ACT Aers  Commonly known as: Ventolin HFA      INHALE 2 PUFFS BY MOUTH EVERY 6 HOURS AS NEEDED FOR SHORTNESS OF BREATH OR WHEEZING   Quantity: 8.5 g  Refills: 5     ALPRAZolam 1 MG Tabs  Commonly known as: Xanax      TAKE 1 TABLET(1 MG) BY MOUTH EVERY NIGHT AS NEEDED FOR SLEEP   Quantity: 15 tablet  Refills: 0     amLODIPine 5 MG Tabs  Commonly known as: Norvasc      Take 1 tablet (5 mg total) by mouth daily. Quantity: 90 tablet  Refills: 1     aspirin 81 MG Tbec      Take 1 tablet (81 mg total) by mouth daily. Quantity: 90 tablet  Refills: 1     budesonide 3 MG Cpep  Commonly known as: Entocort EC      Take 1 capsule (3 mg total) by mouth every morning. Refills: 0     doxycycline 100 MG Caps  Commonly known as: Vibramycin      Take 1 capsule (100 mg total) by mouth 2 (two) times daily. Refills: 0     escitalopram 20 MG Tabs  Commonly known as: Lexapro      Take 1 tablet (20 mg total) by mouth daily. Quantity: 90 tablet  Refills: 1     ipratropium-albuterol 0.5-2.5 (3) MG/3ML Soln  Commonly known as: Duoneb      Take 3 mL by nebulization every 6 (six) hours as needed. Quantity: 180 mL  Refills: 2     lisinopril 30 MG Tabs      Take 1 tablet (30 mg total) by mouth daily. Quantity: 90 tablet  Refills: 1     metoprolol tartrate 50 MG Tabs  Commonly known as: Lopressor      Take 1 tablet (50 mg total) by mouth 2 (two) times daily. Quantity: 180 tablet  Refills: 0     omega-3 fatty acids 1000 MG Caps  Commonly known as: Fish Oil      Take 3,000 mg by mouth daily.    Refills: 0     Repatha SureClick 709 MG/ML Soaj  Generic drug: Evolocumab       Refills: 0     Spiriva HandiHaler 18 MCG Caps  Generic drug: tiotropium      INHALE THE CONTENTS OF 1 CAPSULE VIA INHALATION DEVICE EVERY DAY   Quantity: 30 capsule  Refills: 3     THERA/BETA-CAROTENE Tabs      Take  by mouth. Refills: 0     Trulicity 3.89 FQ/6.7ZE Sopn  Generic drug: Dulaglutide      Inject 0.75 mg into the skin once a week. Quantity: 2 mL  Refills: 2     vitamin C 1000 MG Tabs      Take 1 tablet (1,000 mg total) by mouth daily. Refills: 0     Wixela Inhub 500-50 MCG/ACT Aepb  Generic drug: fluticasone-salmeterol      INHALE 1 PUFF INTO THE LUNGS TWICE DAILY. RINSE MOUTH, GARGLE, AND SPIT FOLLOWING INHALATION   Quantity: 60 each  Refills: 3            Follow up:        Follow up Labs and imaging:         Time spent:  > 30 minutes    Julianna Phoenix MD  5/1/2023    Electronically signed by Aaron Baeza MD on 5/1/2023 11:51 AM         REVIEWER COMMENTS

## 2023-05-01 NOTE — RESPIRATORY THERAPY NOTE
RT was called to assess patient due to shortness of breath. After assessing patient, he was having Expiratory wheezing and diminished lung sounds. Patient has dyspnea upon excerption. Patient state he did not sleep well last night because he needed his CPAP machine. Patient was also prescribed to take Ellipta this morning however since he is being discharged shortly he denied and stated he will take his DPI once he gets home. RT inform MD via [employer].

## 2023-05-01 NOTE — ED QUICK NOTES
Patient adamantly requested to be transported in a wheelchair as he is more comfortable this way. RN advised patient it would be best for the patient to transport in an ER cart due to the amount of drainage noted from his groin. Patient stated \"I don't care, put a pad down. \"  Patient requested pain medication and inhalers. Medication given as ordered, RT called for duoneb. Inpatient RN called to notify RT of patient's arrival to inpatient unit.

## 2023-05-01 NOTE — DISCHARGE SUMMARY
[facility]    Discharge Summary    Jazmine Nagy Patient Status:  Inpatient    1970 MRN [id number]   Location [facility] 4W/SW/SE Attending No att. providers found   [address] Day # 1 PCP Esmer Dutton MD     Date of Admission: 2023   Date of Discharge: 2023 11:00 AM    Admitting Diagnosis: Cellulitis and abscess of leg [L03.119, L02.419]  Failure of outpatient treatment [Z78.9]    Disposition: [address] [place] [address] Discharge Diagnoses: Abscess of the leg    Lace+ Score: 60  59-90 High Risk  29-58 Medium Risk  0-28   Low Risk. TCM Follow-Up Recommendation:  LACE > 58: High Risk of readmission after discharge from the hospital.      Discharge Diagnosis: . Principal Problem:    Cellulitis and abscess of leg  Active Problems:    Failure of outpatient treatment      Hospital Course:   Reason for Admission:   Per Dr. Jose Clemente is a(n) 52year old male, with past medical history significant for coronary artery disease, diverticulitis with seeded infection resulting in liver abscess requiring long-term antibiotics and drainage, presents with a complaint of right groin swelling and tenderness that started approximately 2 weeks ago. Initially was a skin tag which was excised, later on developed into a small lump like swelling persisted for approximately a week, was seen by a surgeon and diagnosed as an epidermoid cyst requiring excision however was scheduled approximately a month and a half later today presents with marked worsening in swelling pain and redness. Claims the pea-sized swelling is now size of a tennis ball extremely tender, aggravated by ambulation with no relieving factors. He rates his pain as a 7 out of 10 at its worst nonradiating in nature. Also complains of subjective fevers with chills. Denies any difficulty with bladder or bowel function.     Discharge Physical Exam:   Left [facility]    Hospital Course:   Cellulitis and abscess of the right leg  - status post incision and drainage in the ER  - patient did not want to wait for me to prescribe him his oral antibiotics  - I notified Dr. Nilesh Vasquez about this  - he left AMA and signed the form without waiting fo rme   - called in prescription- notified surgery and nursing to let patient know  - Nursing will notify him    DVT proph- heparin    Full code        Complications: none    Consultants       Provider   Role Specialty     Shaquille Tan MD  Consulting Physician SURGERY, GENERAL          Pending Labs     Order Current Status    Clostridium difficile(toxigenic)PCR In process    Aerobic Bacterial Culture Preliminary result          Discharge Plan:   Discharge Condition: Stable    Discharge Medication List as of 5/1/2023 11:37 AM    Augmentin 875 mg PO BID for 10 days- 20 tablets- script sent to pharmacy after discharge. Home Meds - Unchanged    budesonide 3 MG Oral Cap DR Particles  Take 1 capsule (3 mg total) by mouth every morning., Historical    doxycycline 100 MG Oral Cap  Take 1 capsule (100 mg total) by mouth 2 (two) times daily. , Historical    Dulaglutide (TRULICITY) 2.54 TN/0.4EW Subcutaneous Solution Pen-injector  Inject 0.75 mg into the skin once a week., Normal, Disp-2 mL, R-2    escitalopram 20 MG Oral Tab  Take 1 tablet (20 mg total) by mouth daily. , Normal, Disp-90 tablet, R-1ZERO refills remain on this prescription. Your patient is requesting advance approval of refills for this medication to PREVENT ANY MISSED DOSES    ALBUTEROL 108 (90 Base) MCG/ACT Inhalation Aero Soln  INHALE 2 PUFFS BY MOUTH EVERY 6 HOURS AS NEEDED FOR SHORTNESS OF BREATH OR WHEEZING, Normal, Disp-8.5 g, R-5    metoprolol tartrate 50 MG Oral Tab  Take 1 tablet (50 mg total) by mouth 2 (two) times daily. , Normal, Disp-180 tablet, R-0ZERO refills remain on this prescription.  Your patient is requesting advance approval of refills for this medication to [address] 18 MCG Inhalation Cap  INHALE THE CONTENTS OF 1 CAPSULE VIA INHALATION DEVICE EVERY DAY, Normal, Disp-30 capsule, R-3    WIXELA INHUB 500-50 MCG/ACT Inhalation Aerosol Powder, Breath Activated  INHALE 1 PUFF INTO THE LUNGS TWICE DAILY. RINSE MOUTH, GARGLE, AND SPIT FOLLOWING INHALATION, Normal, Disp-60 each, R-3ZERO refills remain on this prescription. Your patient is requesting advance approval of refills for this medication to PREVENT ANY MI SSED DOSES    amLODIPine 5 MG Oral Tab  Take 1 tablet (5 mg total) by mouth daily. , Normal, Disp-90 tablet, R-1    ALPRAZOLAM 1 MG Oral Tab  TAKE 1 TABLET(1 MG) BY MOUTH EVERY NIGHT AS NEEDED FOR SLEEP, Normal, Disp-15 tablet, R-0    ipratropium-albuterol 0.5-2.5 (3) MG/3ML Inhalation Solution  Take 3 mL by nebulization every 6 (six) hours as needed., Normal, Disp-180 mL, R-2    aspirin 81 MG Oral Tab EC  Take 1 tablet (81 mg total) by mouth daily. , Normal, Disp-90 tablet, R-1    lisinopril 30 MG Oral Tab  Take 1 tablet (30 mg total) by mouth daily. , Normal, Disp-90 tablet, R-1    Ascorbic Acid (VITAMIN C) 1000 MG Oral Tab  Take 1 tablet (1,000 mg total) by mouth daily. , Historical    omega-3 fatty acids 1000 MG Oral Cap  Take 3,000 mg by mouth daily. , Historical    Multiple Vitamin (MULTI-VITAMINS) Oral Tab  Take  by mouth., Historical    REPATHA SURECLICK 629 MG/ML Subcutaneous Solution Auto-injector  Historical, KELSI              Discharge Diet: Cardiac     Discharge Activity: As tolerated       Discharge Medications      CONTINUE taking these medications      Instructions Prescription details   albuterol 108 (90 Base) MCG/ACT Aers  Commonly known as: Ventolin HFA      INHALE 2 PUFFS BY MOUTH EVERY 6 HOURS AS NEEDED FOR SHORTNESS OF BREATH OR WHEEZING   Quantity: 8.5 g  Refills: 5     ALPRAZolam 1 MG Tabs  Commonly known as: Xanax      TAKE 1 TABLET(1 MG) BY MOUTH EVERY NIGHT AS NEEDED FOR SLEEP   Quantity: 15 tablet  Refills: 0     amLODIPine 5 MG Tabs  Commonly known as: Norvasc      Take 1 tablet (5 mg total) by mouth daily. Quantity: 90 tablet  Refills: 1     aspirin 81 MG Tbec      Take 1 tablet (81 mg total) by mouth daily. Quantity: 90 tablet  Refills: 1     budesonide 3 MG Cpep  Commonly known as: Entocort EC      Take 1 capsule (3 mg total) by mouth every morning. Refills: 0     doxycycline 100 MG Caps  Commonly known as: Vibramycin      Take 1 capsule (100 mg total) by mouth 2 (two) times daily. Refills: 0     escitalopram 20 MG Tabs  Commonly known as: Lexapro      Take 1 tablet (20 mg total) by mouth daily. Quantity: 90 tablet  Refills: 1     ipratropium-albuterol 0.5-2.5 (3) MG/3ML Soln  Commonly known as: Duoneb      Take 3 mL by nebulization every 6 (six) hours as needed. Quantity: 180 mL  Refills: 2     lisinopril 30 MG Tabs      Take 1 tablet (30 mg total) by mouth daily. Quantity: 90 tablet  Refills: 1     metoprolol tartrate 50 MG Tabs  Commonly known as: Lopressor      Take 1 tablet (50 mg total) by mouth 2 (two) times daily. Quantity: 180 tablet  Refills: 0     omega-3 fatty acids 1000 MG Caps  Commonly known as: Fish Oil      Take 3,000 mg by mouth daily. Refills: 0     Repatha SureClick 813 MG/ML Soaj  Generic drug: Evolocumab       Refills: 0     Spiriva HandiHaler 18 MCG Caps  Generic drug: tiotropium      INHALE THE CONTENTS OF 1 CAPSULE VIA INHALATION DEVICE EVERY DAY   Quantity: 30 capsule  Refills: 3     THERA/BETA-CAROTENE Tabs      Take  by mouth. Refills: 0     Trulicity 6.50 MK/8.2OG Sopn  Generic drug: Dulaglutide      Inject 0.75 mg into the skin once a week. Quantity: 2 mL  Refills: 2     vitamin C 1000 MG Tabs      Take 1 tablet (1,000 mg total) by mouth daily. Refills: 0     Wixela Inhub 500-50 MCG/ACT Aepb  Generic drug: fluticasone-salmeterol      INHALE 1 PUFF INTO THE LUNGS TWICE DAILY. RINSE MOUTH, GARGLE, AND SPIT FOLLOWING INHALATION   Quantity: 60 each  Refills: 3            Follow up:        Follow up Labs and imaging:         Time spent:  > 30 minutes    Kaela Moya MD  5/1/2023

## 2023-05-01 NOTE — PLAN OF CARE
Patient is Aox4 on RA. Admitted to unit from ED for drainage of softball size cyst in drainage area. Site is dressed with mepilex and tegaderm - CDI. IVF. IV ABX - Zosyn, and Vancomycin given. Enteric/Contact precautions. Accucheck ACHS. CPAP (noc) due to Hx of Sleep apnea. Up independent. Voiding freely up to the bathroom or via urinal. Dr. Cristian Keyes on consult for general surgery. Tylenol #3 given for pain management. Plan pending.     Problem: Patient Centered Care  Goal: Patient preferences are identified and integrated in the patient's plan of care  Description: Interventions:  - What would you like us to know as we care for you?   - Provide timely, complete, and accurate information to patient/family  - Incorporate patient and family knowledge, values, beliefs, and cultural backgrounds into the planning and delivery of care  - Encourage patient/family to participate in care and decision-making at the level they choose  - Honor patient and family perspectives and choices  Outcome: Progressing     Problem: PAIN - ADULT  Goal: Verbalizes/displays adequate comfort level or patient's stated pain goal  Description: INTERVENTIONS:  - Encourage pt to monitor pain and request assistance  - Assess pain using appropriate pain scale  - Administer analgesics based on type and severity of pain and evaluate response  - Implement non-pharmacological measures as appropriate and evaluate response  - Consider cultural and social influences on pain and pain management  - Manage/alleviate anxiety  - Utilize distraction and/or relaxation techniques  - Monitor for opioid side effects  - Notify MD/LIP if interventions unsuccessful or patient reports new pain  - Anticipate increased pain with activity and pre-medicate as appropriate  Outcome: Progressing     Problem: RISK FOR INFECTION - ADULT  Goal: Absence of fever/infection during anticipated neutropenic period  Description: INTERVENTIONS  - Monitor WBC  - Administer growth factors as ordered  - Implement neutropenic guidelines  Outcome: Progressing     Problem: SAFETY ADULT - FALL  Goal: Free from fall injury  Description: INTERVENTIONS:  - Assess pt frequently for physical needs  - Identify cognitive and physical deficits and behaviors that affect risk of falls.   - Delia fall precautions as indicated by assessment.  - Educate pt/family on patient safety including physical limitations  - Instruct pt to call for assistance with activity based on assessment  - Modify environment to reduce risk of injury  - Provide assistive devices as appropriate  - Consider OT/PT consult to assist with strengthening/mobility  - Encourage toileting schedule  Outcome: Progressing     Problem: DISCHARGE PLANNING  Goal: Discharge to home or other facility with appropriate resources  Description: INTERVENTIONS:  - Identify barriers to discharge w/pt and caregiver  - Include patient/family/discharge partner in discharge planning  - Arrange for needed discharge resources and transportation as appropriate  - Identify discharge learning needs (meds, wound care, etc)  - Arrange for interpreters to assist at discharge as needed  - Consider post-discharge preferences of patient/family/discharge partner  - Complete POLST form as appropriate  - Assess patient's ability to be responsible for managing their own health  - Refer to Case Management Department for coordinating discharge planning if the patient needs post-hospital services based on physician/LIP order or complex needs related to functional status, cognitive ability or social support system  Outcome: Progressing

## 2023-05-17 ENCOUNTER — TELEPHONE (OUTPATIENT)
Dept: SURGERY | Facility: CLINIC | Age: 53
End: 2023-05-17

## 2023-05-17 NOTE — TELEPHONE ENCOUNTER
Reference Number  [id number]    Status  NO ACTION REQUIRED    Message  Requested Service does not require preauthorization. We would strongly encourage you to check benefits for this service.     Member Information  Patient Name  Vidhya Croft    Patient Date of Birth  [id number]    Patient Gender  Male    Member ID  [id number]    Relationship to [address] Name  Vidhya Croft    Requesting Provider     Name  Ruiz Thomas [address] [place] [address]  [id number]    Tax Id  [id number]    Specialty  [id number]  Provider Role  Provider    Address  [place] [address], [place], [place]    Phone  [phone]  Fax  [phone]    Contact Name  Alfonso Narayanan  Service Type  2 - Surgical    Place of Service  [address] [place] [address] From - To Date  2023-05-31 - 2023-08-31    Level of Service  Elective    Diagnosis Code 1  L720 - Epidermal cyst    Procedure Code 1 (CPT/HCPCS)  78980 - EXC TR-EXT B9+CARLOS >4.0 CM    Quantity  1 Units    Status  NO ACTION REQUIRED

## 2023-05-19 RX ORDER — ALBUTEROL SULFATE 90 UG/1
AEROSOL, METERED RESPIRATORY (INHALATION)
Qty: 8.5 G | Refills: 5 | OUTPATIENT
Start: 2023-05-19

## 2023-05-24 ENCOUNTER — TELEPHONE (OUTPATIENT)
Dept: SURGERY | Facility: CLINIC | Age: 53
End: 2023-05-24

## 2023-05-25 NOTE — TELEPHONE ENCOUNTER
Informed patient that the hospital will contact him the day before surgery with the time of arrival.  Pt verbalized understanding and has no questions at this time.

## 2023-05-31 ENCOUNTER — HOSPITAL ENCOUNTER (OUTPATIENT)
Facility: HOSPITAL | Age: 53
Setting detail: HOSPITAL OUTPATIENT SURGERY
Discharge: HOME | End: 2023-05-31
Attending: SURGERY | Admitting: SURGERY
Payer: MEDICAID

## 2023-05-31 ENCOUNTER — ANESTHESIA (OUTPATIENT)
Dept: SURGERY | Facility: HOSPITAL | Age: 53
End: 2023-05-31
Payer: MEDICAID

## 2023-05-31 ENCOUNTER — ANESTHESIA EVENT (OUTPATIENT)
Dept: SURGERY | Facility: HOSPITAL | Age: 53
End: 2023-05-31
Payer: MEDICAID

## 2023-05-31 VITALS
HEIGHT: 67 IN | OXYGEN SATURATION: 94 % | RESPIRATION RATE: 18 BRPM | SYSTOLIC BLOOD PRESSURE: 123 MMHG | WEIGHT: 260 LBS | HEART RATE: 94 BPM | TEMPERATURE: 98 F | DIASTOLIC BLOOD PRESSURE: 62 MMHG | BODY MASS INDEX: 40.81 KG/M2

## 2023-05-31 DIAGNOSIS — L72.0 EPIDERMOID CYST OF SKIN OF THIGH: ICD-10-CM

## 2023-05-31 LAB
GLUCOSE BLDC GLUCOMTR-MCNC: 108 MG/DL (ref 70–99)
GLUCOSE BLDC GLUCOMTR-MCNC: 111 MG/DL (ref 70–99)

## 2023-05-31 PROCEDURE — 88305 TISSUE EXAM BY PATHOLOGIST: CPT | Performed by: SURGERY

## 2023-05-31 PROCEDURE — 88304 TISSUE EXAM BY PATHOLOGIST: CPT | Performed by: SURGERY

## 2023-05-31 PROCEDURE — 82962 GLUCOSE BLOOD TEST: CPT

## 2023-05-31 PROCEDURE — 0HBHXZZ EXCISION OF RIGHT UPPER LEG SKIN, EXTERNAL APPROACH: ICD-10-PCS | Performed by: SURGERY

## 2023-05-31 RX ORDER — HYDROMORPHONE HYDROCHLORIDE 1 MG/ML
0.6 INJECTION, SOLUTION INTRAMUSCULAR; INTRAVENOUS; SUBCUTANEOUS EVERY 5 MIN PRN
Status: DISCONTINUED | OUTPATIENT
Start: 2023-05-31 | End: 2023-05-31

## 2023-05-31 RX ORDER — NICOTINE POLACRILEX 4 MG
15 LOZENGE BUCCAL
Status: DISCONTINUED | OUTPATIENT
Start: 2023-05-31 | End: 2023-05-31 | Stop reason: HOSPADM

## 2023-05-31 RX ORDER — MORPHINE SULFATE 4 MG/ML
2 INJECTION, SOLUTION INTRAMUSCULAR; INTRAVENOUS EVERY 10 MIN PRN
Status: DISCONTINUED | OUTPATIENT
Start: 2023-05-31 | End: 2023-05-31

## 2023-05-31 RX ORDER — POLYETHYLENE GLYCOL 3350 17 G/17G
17 POWDER, FOR SOLUTION ORAL DAILY
Qty: 14 PACKET | Refills: 0 | Status: SHIPPED | OUTPATIENT
Start: 2023-05-31 | End: 2023-06-14

## 2023-05-31 RX ORDER — SODIUM CHLORIDE, SODIUM LACTATE, POTASSIUM CHLORIDE, CALCIUM CHLORIDE 600; 310; 30; 20 MG/100ML; MG/100ML; MG/100ML; MG/100ML
INJECTION, SOLUTION INTRAVENOUS CONTINUOUS
Status: DISCONTINUED | OUTPATIENT
Start: 2023-05-31 | End: 2023-05-31

## 2023-05-31 RX ORDER — HYDROCODONE BITARTRATE AND ACETAMINOPHEN 5; 325 MG/1; MG/1
1 TABLET ORAL EVERY 6 HOURS PRN
Qty: 30 TABLET | Refills: 0 | Status: SHIPPED | OUTPATIENT
Start: 2023-05-31

## 2023-05-31 RX ORDER — BUPIVACAINE HYDROCHLORIDE 5 MG/ML
INJECTION, SOLUTION EPIDURAL; INTRACAUDAL AS NEEDED
Status: DISCONTINUED | OUTPATIENT
Start: 2023-05-31 | End: 2023-05-31 | Stop reason: HOSPADM

## 2023-05-31 RX ORDER — DEXTROSE MONOHYDRATE 25 G/50ML
50 INJECTION, SOLUTION INTRAVENOUS
Status: DISCONTINUED | OUTPATIENT
Start: 2023-05-31 | End: 2023-05-31 | Stop reason: HOSPADM

## 2023-05-31 RX ORDER — FAMOTIDINE 20 MG/1
20 TABLET, FILM COATED ORAL ONCE
Status: COMPLETED | OUTPATIENT
Start: 2023-05-31 | End: 2023-05-31

## 2023-05-31 RX ORDER — HYDROMORPHONE HYDROCHLORIDE 1 MG/ML
0.2 INJECTION, SOLUTION INTRAMUSCULAR; INTRAVENOUS; SUBCUTANEOUS EVERY 5 MIN PRN
Status: DISCONTINUED | OUTPATIENT
Start: 2023-05-31 | End: 2023-05-31

## 2023-05-31 RX ORDER — LIDOCAINE HYDROCHLORIDE 10 MG/ML
INJECTION, SOLUTION EPIDURAL; INFILTRATION; INTRACAUDAL; PERINEURAL AS NEEDED
Status: DISCONTINUED | OUTPATIENT
Start: 2023-05-31 | End: 2023-05-31 | Stop reason: SURG

## 2023-05-31 RX ORDER — ONDANSETRON 2 MG/ML
4 INJECTION INTRAMUSCULAR; INTRAVENOUS EVERY 6 HOURS PRN
Status: DISCONTINUED | OUTPATIENT
Start: 2023-05-31 | End: 2023-05-31

## 2023-05-31 RX ORDER — NALOXONE HYDROCHLORIDE 0.4 MG/ML
80 INJECTION, SOLUTION INTRAMUSCULAR; INTRAVENOUS; SUBCUTANEOUS AS NEEDED
Status: DISCONTINUED | OUTPATIENT
Start: 2023-05-31 | End: 2023-05-31

## 2023-05-31 RX ORDER — MORPHINE SULFATE 10 MG/ML
6 INJECTION, SOLUTION INTRAMUSCULAR; INTRAVENOUS EVERY 10 MIN PRN
Status: DISCONTINUED | OUTPATIENT
Start: 2023-05-31 | End: 2023-05-31

## 2023-05-31 RX ORDER — PROCHLORPERAZINE EDISYLATE 5 MG/ML
5 INJECTION INTRAMUSCULAR; INTRAVENOUS EVERY 8 HOURS PRN
Status: DISCONTINUED | OUTPATIENT
Start: 2023-05-31 | End: 2023-05-31

## 2023-05-31 RX ORDER — HYDROMORPHONE HYDROCHLORIDE 1 MG/ML
0.4 INJECTION, SOLUTION INTRAMUSCULAR; INTRAVENOUS; SUBCUTANEOUS EVERY 5 MIN PRN
Status: DISCONTINUED | OUTPATIENT
Start: 2023-05-31 | End: 2023-05-31

## 2023-05-31 RX ORDER — ACETAMINOPHEN AND CODEINE PHOSPHATE 300; 30 MG/1; MG/1
1-2 TABLET ORAL EVERY 4 HOURS PRN
Qty: 30 TABLET | Refills: 0 | Status: SHIPPED | OUTPATIENT
Start: 2023-05-31

## 2023-05-31 RX ORDER — ACETAMINOPHEN 500 MG
1000 TABLET ORAL ONCE
Status: COMPLETED | OUTPATIENT
Start: 2023-05-31 | End: 2023-05-31

## 2023-05-31 RX ORDER — MORPHINE SULFATE 4 MG/ML
4 INJECTION, SOLUTION INTRAMUSCULAR; INTRAVENOUS EVERY 10 MIN PRN
Status: DISCONTINUED | OUTPATIENT
Start: 2023-05-31 | End: 2023-05-31

## 2023-05-31 RX ORDER — METOPROLOL TARTRATE 5 MG/5ML
2.5 INJECTION INTRAVENOUS ONCE
Status: DISCONTINUED | OUTPATIENT
Start: 2023-05-31 | End: 2023-05-31

## 2023-05-31 RX ORDER — ACETAMINOPHEN AND CODEINE PHOSPHATE 300; 30 MG/1; MG/1
1 TABLET ORAL ONCE
Status: COMPLETED | OUTPATIENT
Start: 2023-05-31 | End: 2023-05-31

## 2023-05-31 RX ORDER — CEFAZOLIN SODIUM/WATER 2 G/20 ML
2 SYRINGE (ML) INTRAVENOUS ONCE
Status: COMPLETED | OUTPATIENT
Start: 2023-05-31 | End: 2023-05-31

## 2023-05-31 RX ORDER — NICOTINE POLACRILEX 4 MG
30 LOZENGE BUCCAL
Status: DISCONTINUED | OUTPATIENT
Start: 2023-05-31 | End: 2023-05-31 | Stop reason: HOSPADM

## 2023-05-31 RX ADMIN — LIDOCAINE HYDROCHLORIDE 50 MG: 10 INJECTION, SOLUTION EPIDURAL; INFILTRATION; INTRACAUDAL; PERINEURAL at 08:43:00

## 2023-05-31 RX ADMIN — SODIUM CHLORIDE, SODIUM LACTATE, POTASSIUM CHLORIDE, CALCIUM CHLORIDE: 600; 310; 30; 20 INJECTION, SOLUTION INTRAVENOUS at 09:31:00

## 2023-05-31 RX ADMIN — CEFAZOLIN SODIUM/WATER 2 G: 2 G/20 ML SYRINGE (ML) INTRAVENOUS at 08:44:00

## 2023-05-31 NOTE — OPERATIVE REPORT
[facility]     Operative Report    Patient Name:  Shine Hurley  MR:  [id number]  :  1970  DOS:  23    Preop Dx:  Epidermoid cyst of skin of thigh [L72.0]  Postop Dx:  Epidermoid cyst of skin of thigh [L72.0]  Procedure:    1. Excision right thigh cyst, 5 cm  2. Layered closure  Surgeon:  Keanu Lemon MD  Surgical Assistant.: Darren Banuelos CSA, CSA  EBL: Blood Output: 10 mL (2023  9:37 AM)    Complication:  None    INDICATION:  Pt is a 52year old male who with Epidermoid cyst of skin of thigh [L72.0] who is scheduled for a Excision right thigh cyst.    CONSENT:  An informed consent discussion was held with the patient regarding the nature of lesions of the subcutaneous tissue, the treatment options and the details of the procedure. The risks including but not limited to bleeding, wound infection, incomplete resection, and recurrence were discussed. The patient expressed understanding and want to proceed with the planned procedure. TECHNIQUE:  The patient was taken to the OR and placed in supine position. MAC was established and the skin of the right upper inner thigh area was prepped in standard fashion. A solution of 0.5% marcaine was used to infiltrate the skin and subcutaneous tissue. An incision was made over the lesion down to the subcutaneous tissue. The lesion was excised entirely measuring 5 x 4 cm. The wound was irrigated with saline and hemostasis was obtained using electrocautery. The skin incision was closed in layers using 2-0 and 4-0 vicryl. Sterile dressings were applied. All instrument and sponge counts were correct. I was present during the critical portions of the procedure.     Keanu Lemon MD

## 2023-05-31 NOTE — ANESTHESIA POSTPROCEDURE EVALUATION
Patient: Berry Restrepo    Procedure Summary       Date: 05/31/23 Room / Location: [address] [place] [address] MAIN OR 16 / [address] [place] [address] MAIN OR    Anesthesia Start: 2903 Anesthesia Stop: 4663    Procedure: Excision right thigh cyst (Right: Lower Leg) Diagnosis:       Epidermoid cyst of skin of thigh      (Epidermoid cyst of skin of thigh [L72.0])    Surgeons: Jag Oscar MD Anesthesiologist: Tonio Torres MD    Anesthesia Type: MAC ASA Status: 3            Anesthesia Type: MAC    Vitals Value Taken Time   /58 05/31/23 0953   Temp 36.8 05/31/23 1003   Pulse 56 05/31/23 1002   Resp 20 05/31/23 1002   SpO2 95 % 05/31/23 1002   Vitals shown include unvalidated device data.     [address] [place] [address] AN Post Evaluation:   Patient Evaluated in PACU  Patient Participation: complete - patient participated  Level of Consciousness: awake and alert  Pain Score: 0  Pain Management: adequate  Airway Patency:patent  Yes    Cardiovascular Status: acceptable and stable  Respiratory Status: acceptable  Postoperative Hydration acceptable      Juan oJse Camacho MD  5/31/2023 10:03 AM

## 2023-06-02 ENCOUNTER — TELEPHONE (OUTPATIENT)
Facility: CLINIC | Age: 53
End: 2023-06-02

## 2023-06-02 RX ORDER — FLUTICASONE PROPIONATE AND SALMETEROL 500; 50 UG/1; UG/1
POWDER RESPIRATORY (INHALATION)
Qty: 60 EACH | Refills: 3 | Status: SHIPPED | OUTPATIENT
Start: 2023-06-02

## 2023-06-02 NOTE — TELEPHONE ENCOUNTER
I called and spoke to the patient,  and name verified. The patient was informed of repeat liver chemistries that is due w/in the next 2 weeks    The patient verbalized understanding.

## 2023-06-02 NOTE — TELEPHONE ENCOUNTER
LOV: 12/20/2022  Last refill: 2/09/2023    Sandra Monge PA-C-please review and sign pended prescription if agreeable.

## 2023-06-02 NOTE — TELEPHONE ENCOUNTER
Patient outreach message received:    Lab recall for 6-8 weeks entered into patient outreach in [place].

## 2023-06-07 ENCOUNTER — OFFICE VISIT (OUTPATIENT)
Dept: SURGERY | Facility: CLINIC | Age: 53
End: 2023-06-07

## 2023-06-07 VITALS — WEIGHT: 260 LBS | BODY MASS INDEX: 41 KG/M2

## 2023-06-07 DIAGNOSIS — Z51.89 VISIT FOR WOUND CHECK: Primary | ICD-10-CM

## 2023-06-07 PROCEDURE — 99024 POSTOP FOLLOW-UP VISIT: CPT

## 2023-06-08 ENCOUNTER — TELEPHONE (OUTPATIENT)
Dept: FAMILY MEDICINE CLINIC | Facility: CLINIC | Age: 53
End: 2023-06-08

## 2023-06-08 NOTE — TELEPHONE ENCOUNTER
Last OV 3/22/2022 for vertigo,     Please advise if request is appropriate and or if labs will be ordered at the time of appt

## 2023-06-08 NOTE — TELEPHONE ENCOUNTER
Patient wants Dr Leatha Cotton to enter any needed labs today. Pt will complete them today. His appt is tomorrow. Call patient when entered.

## 2023-06-09 ENCOUNTER — OFFICE VISIT (OUTPATIENT)
Dept: FAMILY MEDICINE CLINIC | Facility: CLINIC | Age: 53
End: 2023-06-09

## 2023-06-09 ENCOUNTER — LAB ENCOUNTER (OUTPATIENT)
Dept: LAB | Age: 53
End: 2023-06-09
Attending: INTERNAL MEDICINE
Payer: MEDICAID

## 2023-06-09 VITALS
HEART RATE: 73 BPM | HEIGHT: 67 IN | DIASTOLIC BLOOD PRESSURE: 80 MMHG | SYSTOLIC BLOOD PRESSURE: 138 MMHG | BODY MASS INDEX: 41.59 KG/M2 | WEIGHT: 265 LBS

## 2023-06-09 DIAGNOSIS — R74.8 ELEVATED LIVER ENZYMES: ICD-10-CM

## 2023-06-09 DIAGNOSIS — R79.89 ABNORMAL LIVER FUNCTION TEST: Primary | ICD-10-CM

## 2023-06-09 DIAGNOSIS — I10 ESSENTIAL HYPERTENSION: Primary | ICD-10-CM

## 2023-06-09 DIAGNOSIS — F41.9 ANXIETY: ICD-10-CM

## 2023-06-09 LAB
ALBUMIN SERPL-MCNC: 3.7 G/DL (ref 3.4–5)
ALP LIVER SERPL-CCNC: 99 U/L
ALT SERPL-CCNC: 189 U/L
AST SERPL-CCNC: 118 U/L (ref 15–37)
BILIRUB DIRECT SERPL-MCNC: 0.1 MG/DL (ref 0–0.2)
BILIRUB SERPL-MCNC: 0.6 MG/DL (ref 0.1–2)
PROT SERPL-MCNC: 7.4 G/DL (ref 6.4–8.2)

## 2023-06-09 PROCEDURE — 3075F SYST BP GE 130 - 139MM HG: CPT | Performed by: FAMILY MEDICINE

## 2023-06-09 PROCEDURE — 36415 COLL VENOUS BLD VENIPUNCTURE: CPT

## 2023-06-09 PROCEDURE — 80076 HEPATIC FUNCTION PANEL: CPT

## 2023-06-09 PROCEDURE — 3079F DIAST BP 80-89 MM HG: CPT | Performed by: FAMILY MEDICINE

## 2023-06-09 PROCEDURE — 99214 OFFICE O/P EST MOD 30 MIN: CPT | Performed by: FAMILY MEDICINE

## 2023-06-09 PROCEDURE — 3008F BODY MASS INDEX DOCD: CPT | Performed by: FAMILY MEDICINE

## 2023-06-09 RX ORDER — METOPROLOL TARTRATE 50 MG/1
50 TABLET, FILM COATED ORAL 2 TIMES DAILY
Qty: 180 TABLET | Refills: 1 | Status: SHIPPED | OUTPATIENT
Start: 2023-06-09

## 2023-06-09 RX ORDER — ESCITALOPRAM OXALATE 10 MG/1
10 TABLET ORAL DAILY
Qty: 90 TABLET | Refills: 1 | Status: SHIPPED | OUTPATIENT
Start: 2023-06-09

## 2023-06-09 RX ORDER — VIT C/B6/B5/MAGNESIUM/HERB 173 50-5-6-5MG
CAPSULE ORAL
COMMUNITY

## 2023-06-13 ENCOUNTER — OFFICE VISIT (OUTPATIENT)
Dept: SURGERY | Facility: CLINIC | Age: 53
End: 2023-06-13

## 2023-06-13 DIAGNOSIS — Z09 POSTOPERATIVE EXAMINATION: Primary | ICD-10-CM

## 2023-06-13 PROCEDURE — 99024 POSTOP FOLLOW-UP VISIT: CPT | Performed by: SURGERY

## 2023-06-13 NOTE — PROGRESS NOTES
Patient presents with:  Post-Op: S/P Excision of right thigh lipoma. Pt cont. To cover area d/t drainage. VSS  Right thigh wound open, clean. AP:  Open wound after excision of cyst.  Wound care instructions provided. F/u prn.

## 2023-06-26 DIAGNOSIS — R73.03 PREDIABETES: ICD-10-CM

## 2023-06-26 DIAGNOSIS — E66.01 MORBID OBESITY WITH BMI OF 40.0-44.9, ADULT (HCC): ICD-10-CM

## 2023-06-26 DIAGNOSIS — E88.81 INSULIN RESISTANCE: ICD-10-CM

## 2023-06-26 RX ORDER — DULAGLUTIDE 0.75 MG/.5ML
0.75 INJECTION, SOLUTION SUBCUTANEOUS WEEKLY
Qty: 2 ML | Refills: 0 | Status: SHIPPED | OUTPATIENT
Start: 2023-06-26

## 2023-07-14 ENCOUNTER — OFFICE VISIT (OUTPATIENT)
Dept: SURGERY | Facility: CLINIC | Age: 53
End: 2023-07-14
Payer: MEDICAID

## 2023-07-14 VITALS
HEIGHT: 65.75 IN | BODY MASS INDEX: 42.29 KG/M2 | DIASTOLIC BLOOD PRESSURE: 60 MMHG | HEART RATE: 67 BPM | SYSTOLIC BLOOD PRESSURE: 110 MMHG | WEIGHT: 260 LBS | OXYGEN SATURATION: 95 %

## 2023-07-14 DIAGNOSIS — G47.33 OSA ON CPAP: ICD-10-CM

## 2023-07-14 DIAGNOSIS — J44.9 CHRONIC OBSTRUCTIVE PULMONARY DISEASE, UNSPECIFIED COPD TYPE (HCC): ICD-10-CM

## 2023-07-14 DIAGNOSIS — E66.01 MORBID OBESITY WITH BMI OF 40.0-44.9, ADULT (HCC): ICD-10-CM

## 2023-07-14 DIAGNOSIS — K76.0 HEPATIC STEATOSIS: ICD-10-CM

## 2023-07-14 DIAGNOSIS — I10 ESSENTIAL HYPERTENSION, BENIGN: ICD-10-CM

## 2023-07-14 DIAGNOSIS — E88.81 INSULIN RESISTANCE: ICD-10-CM

## 2023-07-14 DIAGNOSIS — R73.03 PREDIABETES: ICD-10-CM

## 2023-07-14 DIAGNOSIS — E78.5 DYSLIPIDEMIA: Primary | ICD-10-CM

## 2023-07-14 DIAGNOSIS — I51.9 HEART DISEASE: ICD-10-CM

## 2023-07-14 PROCEDURE — 3074F SYST BP LT 130 MM HG: CPT | Performed by: NURSE PRACTITIONER

## 2023-07-14 PROCEDURE — 3078F DIAST BP <80 MM HG: CPT | Performed by: NURSE PRACTITIONER

## 2023-07-14 PROCEDURE — 99214 OFFICE O/P EST MOD 30 MIN: CPT | Performed by: NURSE PRACTITIONER

## 2023-07-14 PROCEDURE — 3008F BODY MASS INDEX DOCD: CPT | Performed by: NURSE PRACTITIONER

## 2023-07-14 RX ORDER — DULAGLUTIDE 1.5 MG/.5ML
1.5 INJECTION, SOLUTION SUBCUTANEOUS WEEKLY
Qty: 2 ML | Refills: 1 | Status: SHIPPED | OUTPATIENT
Start: 2023-07-14 | End: 2023-08-11

## 2023-07-18 ENCOUNTER — LAB ENCOUNTER (OUTPATIENT)
Dept: LAB | Age: 53
End: 2023-07-18
Attending: INTERNAL MEDICINE
Payer: MEDICAID

## 2023-07-18 ENCOUNTER — PATIENT MESSAGE (OUTPATIENT)
Facility: CLINIC | Age: 53
End: 2023-07-18

## 2023-07-18 ENCOUNTER — TELEPHONE (OUTPATIENT)
Dept: PULMONOLOGY | Facility: CLINIC | Age: 53
End: 2023-07-18

## 2023-07-18 DIAGNOSIS — R79.89 ABNORMAL LIVER FUNCTION TEST: ICD-10-CM

## 2023-07-18 DIAGNOSIS — K74.00 HEPATIC FIBROSIS: ICD-10-CM

## 2023-07-18 DIAGNOSIS — R74.8 ELEVATED LIVER ENZYMES: Primary | ICD-10-CM

## 2023-07-18 DIAGNOSIS — K76.0 HEPATIC STEATOSIS: ICD-10-CM

## 2023-07-18 LAB
ALBUMIN SERPL-MCNC: 3.7 G/DL (ref 3.4–5)
ALP LIVER SERPL-CCNC: 80 U/L
ALT SERPL-CCNC: 148 U/L
AST SERPL-CCNC: 46 U/L (ref 15–37)
BILIRUB DIRECT SERPL-MCNC: <0.1 MG/DL (ref 0–0.2)
BILIRUB SERPL-MCNC: 0.3 MG/DL (ref 0.1–2)
PROT SERPL-MCNC: 7.1 G/DL (ref 6.4–8.2)

## 2023-07-18 PROCEDURE — 36415 COLL VENOUS BLD VENIPUNCTURE: CPT

## 2023-07-18 PROCEDURE — 80076 HEPATIC FUNCTION PANEL: CPT

## 2023-07-19 NOTE — TELEPHONE ENCOUNTER
From: Jeremias Markham  To: Alana Manzanares MD  Sent: 7/18/2023 5:36 PM CDT  Subject: Question regarding HEPATIC FUNCTION PANEL (7)    Ronald Palomo, how was this test,I don't understand the result?  Thanks Al

## 2023-07-19 NOTE — TELEPHONE ENCOUNTER
Dr Low Fleming    The patient completed liver function test.    HEPATIC FUNCTION PANEL (7)  Order: [id number]  Collected 7/18/2023  8:47 AM       Status: Final result       Dx: Abnormal liver function test    0 Result Notes      Component  Ref Range & Units 7/18/23  8:47 AM   AST  15 - 37 U/L 46 High    ALT  16 - 61 U/L 148 High    Alkaline Phosphatase  45 - 117 U/L 80   Bilirubin, Total  0.1 - 2.0 mg/dL 0.3   Bilirubin, Direct  0.0 - 0.2 mg/dL <0.1   Total Protein  6.4 - 8.2 g/dL 7.1   Albumin  3.4 - 5.0 g/dL 3.7

## 2023-07-19 NOTE — TELEPHONE ENCOUNTER
I spoke to Al. He continues on Trulicity and has lost approximately 10-15 pounds of weight. Although his liver chemistry tests are elevated they are significantly improved as compared to previous. The ALT of 148 and the AST of 46 are at their lowest level since November 2020. I am pleased with the improvement as is Al. I would recommend that he continue best efforts at weight management. I would repeat the hepatic function panel in 2 months. Orders placed.

## 2023-07-20 RX ORDER — FLUTICASONE PROPIONATE AND SALMETEROL 250; 50 UG/1; UG/1
POWDER RESPIRATORY (INHALATION)
Qty: 60 EACH | Refills: 3 | OUTPATIENT
Start: 2023-07-20

## 2023-07-20 RX ORDER — FLUTICASONE PROPIONATE AND SALMETEROL 500; 50 UG/1; UG/1
1 POWDER RESPIRATORY (INHALATION) 2 TIMES DAILY
Qty: 60 EACH | Refills: 3 | OUTPATIENT
Start: 2023-07-20

## 2023-07-20 RX ORDER — AMLODIPINE BESYLATE 5 MG/1
5 TABLET ORAL DAILY
Qty: 20 TABLET | Refills: 0 | Status: CANCELLED | OUTPATIENT
Start: 2023-07-20

## 2023-07-20 NOTE — TELEPHONE ENCOUNTER
LOV: 12/20/22  LR: 6/2/23 - dispense 1 with 3 refills    Spoke with [facility]	[place] states they have refills on file, currently out of stock. Patient will need to call tomorrow to see if pharmacy has it in stock. Spoke with patient, informed him of above, patient verbalized understanding.

## 2023-08-10 RX ORDER — ALPRAZOLAM 1 MG/1
1 TABLET ORAL NIGHTLY PRN
Qty: 15 TABLET | Refills: 0 | Status: SHIPPED | OUTPATIENT
Start: 2023-08-10

## 2023-08-10 RX ORDER — ALBUTEROL SULFATE 90 UG/1
AEROSOL, METERED RESPIRATORY (INHALATION)
Qty: 8.5 G | Refills: 5 | Status: SHIPPED | OUTPATIENT
Start: 2023-08-10

## 2023-08-10 NOTE — TELEPHONE ENCOUNTER
Please review. Protocol Failed or has No Protocol.     Requested Prescriptions   Pending Prescriptions Disp Refills    ALPRAZOLAM 1 MG Oral Tab [Pharmacy Med Name: ALPRAZOLAM 1MG TABLETS] 15 tablet 0     Sig: TAKE 1 TABLET(1 MG) BY MOUTH EVERY NIGHT AS NEEDED FOR SLEEP       There is no refill protocol information for this order          Future Appointments         Provider Department Appt Notes    In 2 months Rhett Dior, APRN [address], [address], [facility]  NON SX F/U            Recent Outpatient Visits              3 weeks ago Dyslipidemia    [address] [facility], [facility], [facility]    Office Visit    1 month ago Postoperative examination    Alonso Pardo MD    Office Visit    2 months ago Essential hypertension    Bri Shaikh MD    Office Visit    2 months ago Visit for wound check    [address] [facility], [place], [place], [employer]    Office Visit    3 months ago Epidermal inclusion cyst    [address], [address], Ricki Rangel MD    Office Visit

## 2023-08-10 NOTE — TELEPHONE ENCOUNTER
LOV: 3/03/2023  Last refill: 12/20/2022    Virginia Mckay PA-C-please review and sign pended prescription if agreeable.

## 2023-08-17 DIAGNOSIS — E88.81 INSULIN RESISTANCE: ICD-10-CM

## 2023-08-17 DIAGNOSIS — K76.0 HEPATIC STEATOSIS: ICD-10-CM

## 2023-08-17 DIAGNOSIS — R73.03 PREDIABETES: ICD-10-CM

## 2023-08-18 RX ORDER — DULAGLUTIDE 1.5 MG/.5ML
1.5 INJECTION, SOLUTION SUBCUTANEOUS WEEKLY
Qty: 2 ML | Refills: 1 | Status: SHIPPED | OUTPATIENT
Start: 2023-08-18

## 2023-08-24 RX ORDER — TIOTROPIUM BROMIDE 18 UG/1
1 CAPSULE ORAL; RESPIRATORY (INHALATION) DAILY
Qty: 30 CAPSULE | Refills: 3 | Status: SHIPPED | OUTPATIENT
Start: 2023-08-24

## 2023-08-24 NOTE — TELEPHONE ENCOUNTER
LOV: 12/20/2022  Last refill:  2/27/2023    *confirmed with patient still taking Spiriva as directed  Jt BEJARANO-please review and sign pended prescription if agreeable.

## 2023-09-22 RX ORDER — ESCITALOPRAM OXALATE 10 MG/1
10 TABLET ORAL DAILY
Qty: 90 TABLET | Refills: 3 | Status: SHIPPED | OUTPATIENT
Start: 2023-09-22

## 2023-09-22 NOTE — TELEPHONE ENCOUNTER
Refill passed per [facility] protocol.    Requested Prescriptions   Pending Prescriptions Disp Refills    ESCITALOPRAM 10 MG Oral Tab [Pharmacy Med Name: ESCITALOPRAM 10MG TABLETS] 90 tablet 1     Sig: TAKE 1 TABLET(10 MG) BY MOUTH DAILY FOR MOOD       Psychiatric Non-Scheduled (Anti-Anxiety) Passed - 9/22/2023  8:02 AM        Passed - In person appointment or virtual visit in the past 6 mos or appointment in next 3 mos     Recent Outpatient Visits              2 months ago Dyslipidemia    Cong Keyes, [address], [facility]    Office Visit    3 months ago Postoperative examination    [address] [facility], [facility] Sil Varner MD    Office Visit    3 months ago Essential hypertension    Kaz Bailey MD    Office Visit    3 months ago Visit for wound check    [address] [facility], [facility], [employer]    Office Visit    5 months ago Epidermal inclusion cyst    Cong Keyes, [address], Jayy Muse MD    Office Visit          Future Appointments         Provider Department Appt Notes    In 1 month [facility], [address], [facility]  NON SX F/U                        Recent Outpatient Visits              2 months ago Dyslipidemia    Cong Keyes, [address], [facility], [facility]    Office Visit    3 months ago Postoperative examination    Bhupinder De La Vega MD    Office Visit    3 months ago Essential hypertension    Kaz Bailey MD    Office Visit    3 months ago Visit for wound check    [address] [facility], [facility], [employer]    Office Visit    5 months ago Epidermal inclusion cyst    Cong Keyes, [address], Shima Lottie Schilling MD    Office Visit           Future Appointments         Provider Department Appt Notes    In 1 month LIANA Ordonez [facility], [address], [facility]  NON SX F/U

## 2023-10-04 ENCOUNTER — TELEPHONE (OUTPATIENT)
Dept: PULMONOLOGY | Facility: CLINIC | Age: 53
End: 2023-10-04

## 2023-10-04 NOTE — TELEPHONE ENCOUNTER
Physician's order order for CPAP supplies received from [facility] and placed in Jt BEJARANO's folder for signature.

## 2023-10-05 NOTE — TELEPHONE ENCOUNTER
Order signed by Hannah Mccracken and faxed to [facility] with confirmation. Copy placed in [facility] folder for . Sent for scanning.

## 2023-10-10 ENCOUNTER — TELEPHONE (OUTPATIENT)
Dept: FAMILY MEDICINE CLINIC | Facility: CLINIC | Age: 53
End: 2023-10-10

## 2023-10-10 DIAGNOSIS — L72.0 EPIDERMOID CYST OF SKIN OF THIGH: Primary | ICD-10-CM

## 2023-10-10 NOTE — TELEPHONE ENCOUNTER
Per patient he has another Epidermoid Cyst of left leg. He would like updated referral to Dr. Sudeep Suarez. Last seen by Dr. Klaudia Santiago in June    Pended for review.

## 2023-10-11 ENCOUNTER — TELEPHONE (OUTPATIENT)
Dept: PULMONOLOGY | Facility: CLINIC | Age: 53
End: 2023-10-11

## 2023-10-11 RX ORDER — FLUTICASONE PROPIONATE AND SALMETEROL 500; 50 UG/1; UG/1
1 POWDER RESPIRATORY (INHALATION) 2 TIMES DAILY
Qty: 60 EACH | Refills: 3 | Status: SHIPPED | OUTPATIENT
Start: 2023-10-11

## 2023-10-11 NOTE — TELEPHONE ENCOUNTER
Patient is requesting referral.     Name of specialist and specialty department : Dr. Katie Sanchez     Reason for visit with the specialist: Epidermoid Cyst of left leg     Address of the specialist office: [phone] . [address] [address]    Appointment date: 10/17/2023         CSS informed patient the turnaround time for referral is 5-7 business days. Patient was informed to check their [facility] account for referral status.

## 2023-10-11 NOTE — TELEPHONE ENCOUNTER
LOV: 12/20/22  NOV: none- mychart sent  Last refill 6/2/23    Patient is requesting 1 year prescription. Som Rowan- please sign prescription if agreeable.

## 2023-10-11 NOTE — TELEPHONE ENCOUNTER
Received prior authorization request and [facility] [employer] Prior Authorization form for [employer]. Fax placed in RN bin.

## 2023-10-12 ENCOUNTER — TELEPHONE (OUTPATIENT)
Dept: PULMONOLOGY | Facility: CLINIC | Age: 53
End: 2023-10-12

## 2023-10-12 NOTE — TELEPHONE ENCOUNTER
Pt states that he received a [employer] message to schedule a follow up appointment. ,  Before he schedules the follow up, he needs to know if he should be repeating CT Lung prior to follow up with [facility].     Please call

## 2023-10-12 NOTE — TELEPHONE ENCOUNTER
Jt BEJARANO-patient is asking to be added to your schedule 10/17 at 10:30 or 12:30. Patient has another appointment at [facility] that day. Otherwise, patient wants to know if he could wait until after LD LUNG screening (end of December) to be seen.

## 2023-10-13 ENCOUNTER — TELEPHONE (OUTPATIENT)
Dept: FAMILY MEDICINE CLINIC | Facility: CLINIC | Age: 53
End: 2023-10-13

## 2023-10-13 NOTE — TELEPHONE ENCOUNTER
Patient requesting another referral to Dr Ana Michelle.    reason for request - pt has another ingrown hair.

## 2023-10-16 ENCOUNTER — LAB ENCOUNTER (OUTPATIENT)
Dept: LAB | Age: 53
End: 2023-10-16
Attending: INTERNAL MEDICINE
Payer: MEDICAID

## 2023-10-16 DIAGNOSIS — K74.00 HEPATIC FIBROSIS: ICD-10-CM

## 2023-10-16 DIAGNOSIS — R74.8 ELEVATED LIVER ENZYMES: ICD-10-CM

## 2023-10-16 DIAGNOSIS — K76.0 HEPATIC STEATOSIS: ICD-10-CM

## 2023-10-16 DIAGNOSIS — K76.0 FATTY LIVER DETERMINED BY BIOPSY: Primary | ICD-10-CM

## 2023-10-16 LAB
AFP-TM SERPL-MCNC: 2.4 NG/ML (ref ?–8)
ALBUMIN SERPL-MCNC: 3.7 G/DL (ref 3.4–5)
ALP LIVER SERPL-CCNC: 81 U/L
ALT SERPL-CCNC: 104 U/L
AST SERPL-CCNC: 36 U/L (ref 15–37)
BILIRUB DIRECT SERPL-MCNC: <0.1 MG/DL (ref 0–0.2)
BILIRUB SERPL-MCNC: 0.4 MG/DL (ref 0.1–2)
PROT SERPL-MCNC: 7.1 G/DL (ref 6.4–8.2)

## 2023-10-16 PROCEDURE — 80076 HEPATIC FUNCTION PANEL: CPT

## 2023-10-16 PROCEDURE — 82105 ALPHA-FETOPROTEIN SERUM: CPT

## 2023-10-16 PROCEDURE — 36415 COLL VENOUS BLD VENIPUNCTURE: CPT

## 2023-10-16 NOTE — TELEPHONE ENCOUNTER
Received fax from [facility] noting PA case# [id number] for Charla Raymond 500/50 has been approved, effective 7/14/23-10/12/24. Spoke with pharmacy staff and informed of approval, informed that patient already picked up medication. Notice sent for scanning.

## 2023-10-17 ENCOUNTER — OFFICE VISIT (OUTPATIENT)
Dept: SURGERY | Facility: CLINIC | Age: 53
End: 2023-10-17
Payer: MEDICAID

## 2023-10-17 DIAGNOSIS — L72.0 EPIDERMAL INCLUSION CYST: Primary | ICD-10-CM

## 2023-10-17 PROCEDURE — 99212 OFFICE O/P EST SF 10 MIN: CPT | Performed by: SURGERY

## 2023-10-17 NOTE — PROGRESS NOTES
Patient presents with:  Cyst: Pt here for cyst on left buttock x few mos. Pt c/o tenderness. VSS  Gen:  NAD  Left perineal/upper inner thigh area on a subcutaneous nodule 2-3 cm in size. No erythema or purulent drainage. AP:  epidermal inclusion cyst of the left upper/inner thigh (same as previously excised one on the right). Plan for excision under MAC.

## 2023-11-02 RX ORDER — ALPRAZOLAM 1 MG/1
1 TABLET ORAL NIGHTLY PRN
Qty: 15 TABLET | Refills: 0 | Status: SHIPPED | OUTPATIENT
Start: 2023-11-02

## 2023-11-02 NOTE — TELEPHONE ENCOUNTER
Please review; protocol failed.    Requested Prescriptions   Pending Prescriptions Disp Refills    ALPRAZOLAM 1 MG Oral Tab [Pharmacy Med Name: ALPRAZOLAM 1MG TABLETS] 15 tablet 0     Sig: TAKE 1 TABLET(1 MG) BY MOUTH EVERY NIGHT AS NEEDED FOR SLEEP       There is no refill protocol information for this order        Recent Outpatient Visits              2 weeks ago Epidermal inclusion cyst    Cj Garcia MD    Office Visit    3 months ago Dyslipidemia    [address] [facility], Freddy Bloom APRN    Office Visit    4 months ago Postoperative examination    Cj Garcia MD    Office Visit    4 months ago Essential hypertension    Nuvia Herbert MD    Office Visit    4 months ago Visit for wound check    [address] [facility], [place], Marni Sartorius, [employer]    Office Visit          Future Appointments         Provider Department Appt Notes    In 1 month Sheri [address] [address] [facility] [employer] [facility]     In 2 months Gifty Lamas PA-C 9316 [facility],[address], 801 [facility] follow-up after LD lung screening    In 2 months [facility], [address] [place] [address],[address], [facility]  NON SX F/U

## 2023-11-07 ENCOUNTER — LAB ENCOUNTER (OUTPATIENT)
Dept: LAB | Age: 53
End: 2023-11-07
Attending: INTERNAL MEDICINE
Payer: MEDICAID

## 2023-11-07 DIAGNOSIS — K76.0 FATTY LIVER DETERMINED BY BIOPSY: ICD-10-CM

## 2023-11-07 DIAGNOSIS — K75.81 NASH (NONALCOHOLIC STEATOHEPATITIS): Primary | ICD-10-CM

## 2023-11-07 DIAGNOSIS — E78.5 HYPERLIPEMIA: ICD-10-CM

## 2023-11-07 DIAGNOSIS — I25.10 CORONARY ATHEROSCLEROSIS OF NATIVE CORONARY ARTERY: Primary | ICD-10-CM

## 2023-11-07 LAB
ALBUMIN SERPL-MCNC: 4.7 G/DL (ref 3.2–4.8)
ALP LIVER SERPL-CCNC: 82 U/L
ALT SERPL-CCNC: 104 U/L
AST SERPL-CCNC: 41 U/L (ref ?–34)
BILIRUB DIRECT SERPL-MCNC: 0.2 MG/DL (ref ?–0.3)
BILIRUB SERPL-MCNC: 0.6 MG/DL (ref 0.3–1.2)
CHOLEST SERPL-MCNC: 158 MG/DL (ref ?–200)
FASTING PATIENT LIPID ANSWER: YES
HDLC SERPL-MCNC: 46 MG/DL (ref 40–59)
LDLC SERPL CALC-MCNC: 77 MG/DL (ref ?–100)
NONHDLC SERPL-MCNC: 112 MG/DL (ref ?–130)
PROT SERPL-MCNC: 7.2 G/DL (ref 5.7–8.2)
TRIGL SERPL-MCNC: 213 MG/DL (ref 30–149)
VLDLC SERPL CALC-MCNC: 33 MG/DL (ref 0–30)

## 2023-11-07 PROCEDURE — 80061 LIPID PANEL: CPT

## 2023-11-07 PROCEDURE — 36415 COLL VENOUS BLD VENIPUNCTURE: CPT

## 2023-11-07 PROCEDURE — 80076 HEPATIC FUNCTION PANEL: CPT

## 2023-11-21 ENCOUNTER — ANESTHESIA EVENT (OUTPATIENT)
Dept: SURGERY | Facility: HOSPITAL | Age: 53
End: 2023-11-21
Payer: MEDICAID

## 2023-11-22 ENCOUNTER — TELEPHONE (OUTPATIENT)
Dept: SURGERY | Facility: CLINIC | Age: 53
End: 2023-11-22

## 2023-11-22 ENCOUNTER — HOSPITAL ENCOUNTER (OUTPATIENT)
Facility: HOSPITAL | Age: 53
Setting detail: HOSPITAL OUTPATIENT SURGERY
Discharge: HOME | End: 2023-11-22
Attending: SURGERY | Admitting: SURGERY
Payer: MEDICAID

## 2023-11-22 ENCOUNTER — ANESTHESIA (OUTPATIENT)
Dept: SURGERY | Facility: HOSPITAL | Age: 53
End: 2023-11-22
Payer: MEDICAID

## 2023-11-22 VITALS
DIASTOLIC BLOOD PRESSURE: 72 MMHG | TEMPERATURE: 98 F | WEIGHT: 256 LBS | HEART RATE: 60 BPM | OXYGEN SATURATION: 99 % | SYSTOLIC BLOOD PRESSURE: 137 MMHG | BODY MASS INDEX: 40.18 KG/M2 | HEIGHT: 67 IN | RESPIRATION RATE: 16 BRPM

## 2023-11-22 DIAGNOSIS — L72.0 EPIDERMAL INCLUSION CYST: ICD-10-CM

## 2023-11-22 LAB
GLUCOSE BLDC GLUCOMTR-MCNC: 102 MG/DL (ref 70–99)
GLUCOSE BLDC GLUCOMTR-MCNC: 86 MG/DL (ref 70–99)

## 2023-11-22 PROCEDURE — 11406 EXC TR-EXT B9+MARG >4.0 CM: CPT | Performed by: SURGERY

## 2023-11-22 PROCEDURE — 0HBJXZZ EXCISION OF LEFT UPPER LEG SKIN, EXTERNAL APPROACH: ICD-10-PCS | Performed by: SURGERY

## 2023-11-22 RX ORDER — SODIUM CHLORIDE, SODIUM LACTATE, POTASSIUM CHLORIDE, CALCIUM CHLORIDE 600; 310; 30; 20 MG/100ML; MG/100ML; MG/100ML; MG/100ML
INJECTION, SOLUTION INTRAVENOUS CONTINUOUS
OUTPATIENT
Start: 2023-11-22

## 2023-11-22 RX ORDER — INSULIN ASPART 100 [IU]/ML
INJECTION, SOLUTION INTRAVENOUS; SUBCUTANEOUS ONCE
OUTPATIENT
Start: 2023-11-22 | End: 2023-11-22

## 2023-11-22 RX ORDER — BUPIVACAINE HYDROCHLORIDE 5 MG/ML
INJECTION, SOLUTION EPIDURAL; INTRACAUDAL AS NEEDED
Status: DISCONTINUED | OUTPATIENT
Start: 2023-11-22 | End: 2023-11-22 | Stop reason: HOSPADM

## 2023-11-22 RX ORDER — LABETALOL HYDROCHLORIDE 5 MG/ML
5 INJECTION, SOLUTION INTRAVENOUS EVERY 5 MIN PRN
OUTPATIENT
Start: 2023-11-22 | End: 2023-11-22

## 2023-11-22 RX ORDER — MORPHINE SULFATE 4 MG/ML
4 INJECTION, SOLUTION INTRAMUSCULAR; INTRAVENOUS EVERY 10 MIN PRN
OUTPATIENT
Start: 2023-11-22

## 2023-11-22 RX ORDER — MIDAZOLAM HYDROCHLORIDE 1 MG/ML
INJECTION INTRAMUSCULAR; INTRAVENOUS AS NEEDED
Status: DISCONTINUED | OUTPATIENT
Start: 2023-11-22 | End: 2023-11-22 | Stop reason: SURG

## 2023-11-22 RX ORDER — MORPHINE SULFATE 10 MG/ML
6 INJECTION, SOLUTION INTRAMUSCULAR; INTRAVENOUS EVERY 10 MIN PRN
OUTPATIENT
Start: 2023-11-22

## 2023-11-22 RX ORDER — PROCHLORPERAZINE EDISYLATE 5 MG/ML
5 INJECTION INTRAMUSCULAR; INTRAVENOUS EVERY 8 HOURS PRN
OUTPATIENT
Start: 2023-11-22

## 2023-11-22 RX ORDER — HYDROMORPHONE HYDROCHLORIDE 1 MG/ML
0.2 INJECTION, SOLUTION INTRAMUSCULAR; INTRAVENOUS; SUBCUTANEOUS EVERY 5 MIN PRN
OUTPATIENT
Start: 2023-11-22

## 2023-11-22 RX ORDER — POLYETHYLENE GLYCOL 3350 17 G/17G
17 POWDER, FOR SOLUTION ORAL DAILY
Qty: 14 PACKET | Refills: 0 | Status: SHIPPED | OUTPATIENT
Start: 2023-11-22 | End: 2023-12-06

## 2023-11-22 RX ORDER — FAMOTIDINE 20 MG/1
20 TABLET, FILM COATED ORAL ONCE
Status: DISCONTINUED | OUTPATIENT
Start: 2023-11-22 | End: 2023-11-22 | Stop reason: HOSPADM

## 2023-11-22 RX ORDER — ALBUTEROL SULFATE 2.5 MG/3ML
2.5 SOLUTION RESPIRATORY (INHALATION) AS NEEDED
OUTPATIENT
Start: 2023-11-22 | End: 2023-11-22

## 2023-11-22 RX ORDER — HYDROCODONE BITARTRATE AND ACETAMINOPHEN 5; 325 MG/1; MG/1
1 TABLET ORAL EVERY 6 HOURS PRN
Qty: 30 TABLET | Refills: 0 | Status: SHIPPED | OUTPATIENT
Start: 2023-11-22

## 2023-11-22 RX ORDER — SODIUM CHLORIDE, SODIUM LACTATE, POTASSIUM CHLORIDE, CALCIUM CHLORIDE 600; 310; 30; 20 MG/100ML; MG/100ML; MG/100ML; MG/100ML
INJECTION, SOLUTION INTRAVENOUS CONTINUOUS
Status: DISCONTINUED | OUTPATIENT
Start: 2023-11-22 | End: 2023-11-22

## 2023-11-22 RX ORDER — ACETAMINOPHEN 500 MG
1000 TABLET ORAL ONCE
Status: DISCONTINUED | OUTPATIENT
Start: 2023-11-22 | End: 2023-11-22 | Stop reason: HOSPADM

## 2023-11-22 RX ORDER — HYDROMORPHONE HYDROCHLORIDE 1 MG/ML
0.4 INJECTION, SOLUTION INTRAMUSCULAR; INTRAVENOUS; SUBCUTANEOUS EVERY 5 MIN PRN
OUTPATIENT
Start: 2023-11-22

## 2023-11-22 RX ORDER — NALOXONE HYDROCHLORIDE 0.4 MG/ML
0.08 INJECTION, SOLUTION INTRAMUSCULAR; INTRAVENOUS; SUBCUTANEOUS AS NEEDED
OUTPATIENT
Start: 2023-11-22 | End: 2023-11-22

## 2023-11-22 RX ORDER — MORPHINE SULFATE 2 MG/ML
2 INJECTION, SOLUTION INTRAMUSCULAR; INTRAVENOUS EVERY 10 MIN PRN
OUTPATIENT
Start: 2023-11-22

## 2023-11-22 RX ORDER — FAMOTIDINE 10 MG/ML
20 INJECTION, SOLUTION INTRAVENOUS ONCE
Status: DISCONTINUED | OUTPATIENT
Start: 2023-11-22 | End: 2023-11-22 | Stop reason: HOSPADM

## 2023-11-22 RX ORDER — CEFAZOLIN SODIUM/WATER 2 G/20 ML
2 SYRINGE (ML) INTRAVENOUS ONCE
Status: COMPLETED | OUTPATIENT
Start: 2023-11-22 | End: 2023-11-22

## 2023-11-22 RX ORDER — HYDROMORPHONE HYDROCHLORIDE 1 MG/ML
0.6 INJECTION, SOLUTION INTRAMUSCULAR; INTRAVENOUS; SUBCUTANEOUS EVERY 5 MIN PRN
OUTPATIENT
Start: 2023-11-22

## 2023-11-22 RX ORDER — ONDANSETRON 2 MG/ML
4 INJECTION INTRAMUSCULAR; INTRAVENOUS EVERY 6 HOURS PRN
OUTPATIENT
Start: 2023-11-22

## 2023-11-22 RX ORDER — LIDOCAINE HYDROCHLORIDE 10 MG/ML
INJECTION, SOLUTION EPIDURAL; INFILTRATION; INTRACAUDAL; PERINEURAL AS NEEDED
Status: DISCONTINUED | OUTPATIENT
Start: 2023-11-22 | End: 2023-11-22 | Stop reason: SURG

## 2023-11-22 RX ADMIN — MIDAZOLAM HYDROCHLORIDE 2 MG: 1 INJECTION INTRAMUSCULAR; INTRAVENOUS at 14:18:00

## 2023-11-22 RX ADMIN — LIDOCAINE HYDROCHLORIDE 50 MG: 10 INJECTION, SOLUTION EPIDURAL; INFILTRATION; INTRACAUDAL; PERINEURAL at 14:20:00

## 2023-11-22 RX ADMIN — CEFAZOLIN SODIUM/WATER 2 G: 2 G/20 ML SYRINGE (ML) INTRAVENOUS at 14:21:00

## 2023-11-22 NOTE — TELEPHONE ENCOUNTER
Pt states the following medication doesn't work for him asking for tylenol with condone in it please advise         HYDROcodone-acetaminophen 5-325

## 2023-11-22 NOTE — H&P
[facility]        History of Present Illness:   Patient is a 52year old male who presents for excision of left thigh cyst.    HPI    Past Medical History  Past Medical History:   Diagnosis Date    Anxiety state     CAD (coronary artery disease) 2013    COPD (chronic obstructive pulmonary disease) ([address]     Disorder of liver     enlarged liver    Diverticula of intestine     Essential hypertension     Heart disease 10/14/2022    High blood pressure     High cholesterol     History of blood transfusion     No reaction    Hx of motion sickness     Vertigo    Hyperlipidemia     Hypertension     Hypertriglyceridemia     Lipid screening 06/23/2014    MADELIN on CPAP     Prediabetes     Sleep apnea     CPAP    Tobacco dependence     Umbilical hernia 1073    Visual impairment     Glasses       Past Surgical History  Past Surgical History:   Procedure Laterality Date    ANESTH,SURGERY OF SHOULDER Left     BYPASS SURGERY  01/2014    CABG      COLONOSCOPY  09/01/2016        COLONOSCOPY      COLONOSCOPY N/A 04/11/2022    Procedure: COLONOSCOPY;  Surgeon: Tonny Eugene MD;  Location: [address] [place] [address] ENDOSCOPY    ELECTROCARDIOGRAM, COMPLETE  01/18/2014    scanned to media tab    [address]         Family History  Family History   Problem Relation Age of Onset    Hypertension Father     Heart Attack Father 40    Heart Disorder Father         AMI    Heart Disorder Mother     Diabetes Mother     Dementia Mother     Other (Alzheimer's Disease) Mother        Social History  Social History     Socioeconomic History    Marital status:    Tobacco Use    Smoking status: Every Day     Packs/day: 0.50     Years: 30.00     Additional pack years: 0.00     Total pack years: 15.00     Types: Cigarettes     Passive exposure: Current    Smokeless tobacco: Former    Tobacco comments:     \"5 or 6 cigarettes a day\"   Vaping Use    Vaping Use: Former    Substances: Nicotine    Devices: Disposable   Substance and Sexual Activity    Alcohol use: Yes     Alcohol/week: 4.0 standard drinks of alcohol     Types: 4 Shots of liquor per week     Comment: couple times a week    Drug use: Yes     Types: Cannabis     Comment: occasional   Other Topics Concern    Caffeine Concern Yes     Comment: coffee, 2 cups/day    Reaction to local anesthetic No    Pt has a pacemaker No    Pt has a defibrillator No           Current Medications:  Current Facility-Administered Medications   Medication Dose Route Frequency    lactated ringers infusion   Intravenous Continuous    acetaminophen (Tylenol Extra Strength) tab 1,000 mg  1,000 mg Oral Once    metoprolol tartrate (Lopressor) tab 25 mg  25 mg Oral Once PRN    famotidine (Pepcid) tab 20 mg  20 mg Oral Once    Or    famotidine (Pepcid) 20 mg/2mL injection 20 mg  20 mg Intravenous Once    ceFAZolin (Ancef) 2 g in 20mL IV syringe premix  2 g Intravenous Once     Medications Prior to Admission   Medication Sig    ALPRAZolam 1 MG Oral Tab Take 1 tablet (1 mg total) by mouth nightly as needed for Sleep. fluticasone-salmeterol (WIXELA INHUB) 500-50 MCG/ACT Inhalation Aerosol Powder, Breath Activated Inhale 1 puff into the lungs 2 (two) times daily. escitalopram 10 MG Oral Tab Take 1 tablet (10 mg total) by mouth daily. lisinopril 30 MG Oral Tab Take 1 tablet (30 mg total) by mouth daily. tiotropium (SPIRIVA HANDIHALER) 18 MCG Inhalation Cap Inhale 1 capsule (18 mcg total) into the lungs daily. TRULICITY 1.5 RB/8.0PB Subcutaneous Solution Pen-injector Inject 1.5 mg into the skin once a week. albuterol 108 (90 Base) MCG/ACT Inhalation Aero Soln INHALE 2 PUFFS BY MOUTH EVERY 6 HOURS AS NEEDED FOR SHORTNESS OF BREATH OR WHEEZING    amLODIPine 5 MG Oral Tab TAKE 1 TABLET(5 MG) BY MOUTH DAILY    Turmeric (QC TUMERIC COMPLEX) 500 MG Oral Cap Take by mouth.    metoprolol tartrate 50 MG Oral Tab Take 1 tablet (50 mg total) by mouth 2 (two) times daily.     ascorbic acid 1000 MG Oral Tab Take 1 tablet (1,000 mg total) by mouth daily. ipratropium-albuterol 0.5-2.5 (3) MG/3ML Inhalation Solution Take 3 mL by nebulization every 6 (six) hours as needed. REPATHA SURECLICK 198 MG/ML Subcutaneous Solution Auto-injector Inject twice monthly    aspirin 81 MG Oral Tab EC Take 1 tablet (81 mg total) by mouth daily. omega-3 fatty acids 1000 MG Oral Cap Take 3,000 mg by mouth daily. Multiple Vitamin (MULTI-VITAMINS) Oral Tab Take  by mouth. acetaminophen-codeine 300-30 MG Oral Tab Take 1-2 tablets by mouth every 4 (four) hours as needed for Pain. Allergies  Allergies   Allergen Reactions    Neosporin [Neomycin-Bacitracin-Polymyxin] RASH       Review of Systems:   Review of Systems   Constitutional: Negative. HENT: Negative. Eyes: Negative. Respiratory: Negative. Cardiovascular: Negative. Gastrointestinal: Negative. Endocrine: Negative. Genitourinary: Negative. Musculoskeletal: Negative. Skin: Negative. Allergic/Immunologic: Negative. Neurological: Negative. Hematological:  Does not bruise/bleed easily. Psychiatric/Behavioral: Negative. Physical Exam:   Vital Signs:  Height 5' 7.5\" (1.715 m), weight 256 lb (116.1 kg). Physical Exam  Vitals reviewed. Constitutional:       Appearance: Normal appearance. He is well-developed. HENT:      Head: Normocephalic and atraumatic. Cardiovascular:      Rate and Rhythm: Normal rate and regular rhythm. Pulmonary:      Effort: Pulmonary effort is normal.      Breath sounds: Normal breath sounds. Abdominal:      General: There is no distension. Palpations: Abdomen is soft. There is no mass. Tenderness: There is no abdominal tenderness. There is no guarding or rebound. Musculoskeletal:         General: Normal range of motion. Cervical back: Normal range of motion and neck supple. Skin:     General: Skin is warm and dry. Findings: Lesion present. Comments: Left inner thigh lesion   Neurological:      Mental Status: He is alert and oriented to person, place, and time. Psychiatric:         Speech: Speech normal.         Behavior: Behavior normal.          Results:     Laboratory Data:  Lab Results   Component Value Date    WBC 6.9 05/01/2023    HGB 14.5 05/01/2023    HCT 44.1 05/01/2023    .0 (L) 05/01/2023    CREATSERUM 0.97 05/01/2023    BUN 13 05/01/2023     05/01/2023    K 3.7 05/01/2023     05/01/2023    CO2 27.0 05/01/2023     (H) 05/01/2023    CA 9.2 05/01/2023    ALB 4.7 11/07/2023    ALKPHO 82 11/07/2023    TP 7.2 11/07/2023    AST 41 (H) 11/07/2023     (H) 11/07/2023    INR 0.93 04/10/2023    PTP 12.5 04/10/2023    T4F 0.9 11/30/2022    TSH 2.290 11/30/2022     (H) 03/08/2022    CRP 0.89 (H) 03/08/2022    B12 1,007 (H) 03/08/2022         Imaging:  No results found. Impression:   AP:  epidermal inclusion cyst of the left upper/inner thigh (same as previously excised one on the right). Plan for excision under MAC.      Sudeep Suarez MD  11/22/2023

## 2023-11-22 NOTE — OPERATIVE REPORT
[facility]     Operative Report    Patient Name:  Rosi Mancuso  MR:  [id number]  :  1970  DOS:  23    Preop Dx:  Epidermal inclusion cyst [L72.0]  Postop Dx:  Epidermal inclusion cyst [L72.0]  Procedure:    Excision of Subcutaneous nodule left perineal upper inner thigh area  Surgeon:  Saint Prude, MD  Surgical Assistant.: Beverly Baeza CSA,  EBL: 20 ml  Complication:  None    INDICATION:  Pt is a 52year old male who with Epidermal inclusion cyst [L72.0] who is scheduled for a Excision of Subcutaneous nodule left perineal upper inner thigh area. CONSENT:  An informed consent discussion was held with the patient regarding the nature of lesions of the subcutaneous tissue, the treatment options and the details of the procedure. The risks including but not limited to bleeding, wound infection, incomplete resection, and recurrence were discussed. The patient expressed understanding and want to proceed with the planned procedure. TECHNIQUE:  The patient was taken to the OR and placed in lithotomy position. MAC was established and the skin of the left inner thigh and perineum were prepped in standard fashion. A solution of 0.5% marcaine was used to infiltrate the skin and subcutaneous tissue. An elliptical incision was made over the lesion down to the subcutaneous tissue. The lesion(s) were excised entirely measuring 5 x 3 cm. It had the gross appearance of an epidermal inclusion cyst. The wound was irrigated with saline and hemostasis was obtained using electrocautery. The skin incision was closed using 2-0 nylon sutures in vertical mattress fashion. Sterile dressings were applied. All instrument and sponge counts were correct. I was present during the critical portions of the procedure.     Saint Prude, MD

## 2023-11-24 NOTE — TELEPHONE ENCOUNTER
Called patient. Reports pain slightly improved today, reports typically taking tylenol #3 for pain, although norco was prescribed following this procedure. Advised pt to try taking norco for pain, can also use ice pack for pain. Pt understanding and agreeable, to call back with any other concerns.

## 2023-11-24 NOTE — TELEPHONE ENCOUNTER
Per pt states he was in pain yesterday without medication, asking for tylenol with codeine. Please advise thank you.

## 2023-11-27 RX ORDER — TIOTROPIUM BROMIDE 18 UG/1
1 CAPSULE ORAL; RESPIRATORY (INHALATION) DAILY
Qty: 30 CAPSULE | Refills: 5 | Status: SHIPPED | OUTPATIENT
Start: 2023-11-27

## 2023-11-29 ENCOUNTER — TELEPHONE (OUTPATIENT)
Dept: SURGERY | Facility: CLINIC | Age: 53
End: 2023-11-29

## 2023-11-29 RX ORDER — ACETAMINOPHEN AND CODEINE PHOSPHATE 300; 30 MG/1; MG/1
1 TABLET ORAL EVERY 4 HOURS PRN
Qty: 30 TABLET | Refills: 0 | Status: SHIPPED | OUTPATIENT
Start: 2023-11-29

## 2023-12-01 NOTE — TELEPHONE ENCOUNTER
I spoke to the patient. He is feeling well. Unfortunately the vertigo persists. Weight is down #5. His transaminases remain significantly elevated. Synthetic function is normal.  Medications are reviewed. Kaitlin Altman would be unlikely to cause elevated liver enzymes. There are no other potentially causative medications. His recent liver biopsy revealed steatohepatitis. He cannot see bariatrics until January. We will attempt to move up this appointment. I will also review the patient's liver biopsy with pathology and contact him after this review. Please call back.

## 2023-12-06 ENCOUNTER — TELEPHONE (OUTPATIENT)
Dept: SURGERY | Facility: CLINIC | Age: 53
End: 2023-12-06

## 2023-12-08 ENCOUNTER — TELEPHONE (OUTPATIENT)
Dept: FAMILY MEDICINE CLINIC | Facility: CLINIC | Age: 53
End: 2023-12-08

## 2023-12-08 DIAGNOSIS — M25.511 ACUTE PAIN OF RIGHT SHOULDER: Primary | ICD-10-CM

## 2023-12-08 NOTE — TELEPHONE ENCOUNTER
Pt was seen today but forgot to ask for the referral to receive a cortisone shot in his right shoulder. Pt asking to send referral to this MyChart.

## 2023-12-09 NOTE — TELEPHONE ENCOUNTER
Pt states he needs a referral to a specialist for Right shoulder pain, reports having Cortizone injections 30 years ago and it helped. Please advise    Referral pended.

## 2023-12-12 ENCOUNTER — OFFICE VISIT (OUTPATIENT)
Dept: SURGERY | Facility: CLINIC | Age: 53
End: 2023-12-12
Payer: MEDICAID

## 2023-12-12 VITALS — BODY MASS INDEX: 40.18 KG/M2 | WEIGHT: 256 LBS | HEIGHT: 67 IN

## 2023-12-12 DIAGNOSIS — Z09 POSTOPERATIVE EXAMINATION: Primary | ICD-10-CM

## 2023-12-12 PROCEDURE — 99212 OFFICE O/P EST SF 10 MIN: CPT | Performed by: SURGERY

## 2023-12-12 PROCEDURE — 3008F BODY MASS INDEX DOCD: CPT | Performed by: SURGERY

## 2023-12-14 ENCOUNTER — TELEPHONE (OUTPATIENT)
Facility: CLINIC | Age: 53
End: 2023-12-14

## 2023-12-14 NOTE — TELEPHONE ENCOUNTER
1st,overdue reminder letter mailed out to patient    Imaging order:    US LIVER (CPT=76705) (Order #[id number]) on 10/16/23

## 2023-12-18 ENCOUNTER — TELEPHONE (OUTPATIENT)
Dept: PULMONOLOGY | Facility: CLINIC | Age: 53
End: 2023-12-18

## 2023-12-18 NOTE — TELEPHONE ENCOUNTER
Please review; protocol failed. No active /future labs noted     Requested Prescriptions   Pending Prescriptions Disp Refills    METOPROLOL TARTRATE 50 MG Oral Tab [Pharmacy Med Name: METOPROLOL TARTRATE 50MG TABLETS] 180 tablet 1     Sig: TAKE 1 TABLET(50 MG) BY MOUTH TWICE DAILY       Hypertensive Medications Protocol Failed - 12/15/2023  5:27 PM        Failed - CMP or BMP in past 6 months     No results found for this or any previous visit (from the past 4392 hour(s)).             Passed - In person appointment in the past 12 or next 3 months     Recent Outpatient Visits              6 days ago Postoperative examination    [address] [facility], Rosanna Ronquillo MD    Office Visit    1 week ago [address], [address] [facility] Bhargav Alonzo MD    Office Visit    2 months ago Epidermal inclusion cyst    [address] [facility], Christopher Slater MD    Office Visit    5 months ago Dyslipidemia    [facility], [address] [facility],[address] Floor, [facility]    Office Visit    6 months ago Postoperative examination    [address] [facility], Jennifer Vizcaino MD    Office Visit          Future Appointments         Provider Department Appt Notes    In 3 days Sheri [address] [address] [address]     In 2 weeks Radha Chang PA-C 6161 [facility],[address], 602 [facility] follow-up after LD lung screening    In 2 weeks [address]     In 2 weeks DO Ry [facility], [address] [facility] Acute pain of right shoulder (Per  OK to add Pt on the schedule) - Ref By Becca Alcala  - [employer] COMM               Passed - Last BP reading less than 140/90     BP Readings from Last 1 Encounters:   12/08/23 129/74               Passed - In person appointment or virtual visit in the past 6 months     Recent Outpatient Visits              6 days ago Postoperative examination    Hesham Astudillo MD    Office Visit    1 week ago [address], [address] [facility], [facility] Lorelei    Office Visit    2 months ago Epidermal inclusion cyst    Shima Lock [place], MD    Office Visit    5 months ago Dyslipidemia    [facility], [address], [place], Mirella Pan, APRN    Office Visit    6 months ago Postoperative examination    Malick Lock MD    Office Visit          Future Appointments         Provider Department Appt Notes    In 3 days [facility]     In 2 weeks Ariela Villeda PA-C [address] [facility], [address] [facility] follow-up after LD lung screening    In 2 weeks [facility] [address]     In 2 weeks Esmer Child DO [address], [address], [place] Acute pain of right shoulder (Per Dr.Patel SOW to add Pt on the schedule) - Ref By [employer]  - [employer] [facility]               Passed - EGFRCR or GFRNAA > 50     GFR Evaluation  EGFRCR: 92 , resulted on 5/1/2023             Recent Outpatient Visits              6 days ago Postoperative examination    Hesham Astudillo MD    Office Visit    1 week ago Delon Wyman MD    Office Visit    2 months ago Epidermal inclusion cyst    Hesham Astudillo MD    Office Visit    5 months ago Dyslipidemia    [facility], [address], [place], Mirella Pan, APRKATIE    Office Visit    6 months ago Postoperative examination    Malick Lock MD    Office Visit          Future Appointments Provider Department Appt Notes    In 3 days [address] 220 [facility]     In 2 weeks [facility] follow-up after LD lung screening    In 2 weeks ADO 12 [facility]     In 2 weeks DO Ry [facility], [address], [place] Acute pain of right shoulder (Per  OK to add Pt on the schedule) - Ref By Branden Peraza  - [facility] COMM

## 2023-12-18 NOTE — TELEPHONE ENCOUNTER
Received fax from [employer] with request for clinical information.  Attached ovn and medication list. Confirmation received

## 2023-12-19 RX ORDER — METOPROLOL TARTRATE 50 MG/1
50 TABLET, FILM COATED ORAL 2 TIMES DAILY
Qty: 180 TABLET | Refills: 1 | Status: SHIPPED | OUTPATIENT
Start: 2023-12-19

## 2023-12-21 ENCOUNTER — HOSPITAL ENCOUNTER (OUTPATIENT)
Dept: CT IMAGING | Facility: HOSPITAL | Age: 53
Discharge: HOME | End: 2023-12-21
Attending: PHYSICIAN ASSISTANT
Payer: MEDICAID

## 2023-12-21 DIAGNOSIS — Z87.891 PERSONAL HISTORY OF TOBACCO USE, PRESENTING HAZARDS TO HEALTH: ICD-10-CM

## 2023-12-21 DIAGNOSIS — Z72.0 TOBACCO ABUSE: Primary | ICD-10-CM

## 2023-12-21 PROCEDURE — 71271 CT THORAX LUNG CANCER SCR C-: CPT | Performed by: PHYSICIAN ASSISTANT

## 2023-12-22 RX ORDER — ACETAMINOPHEN AND CODEINE PHOSPHATE 300; 30 MG/1; MG/1
1-2 TABLET ORAL EVERY 4 HOURS PRN
Qty: 30 TABLET | Refills: 0 | OUTPATIENT
Start: 2023-12-22

## 2023-12-22 NOTE — TELEPHONE ENCOUNTER
Please review; protocol failed/ has no protocol    Requested Prescriptions   Pending Prescriptions Disp Refills    ALPRAZOLAM 1 MG Oral Tab [Pharmacy Med Name: ALPRAZOLAM 1MG TABLETS] 15 tablet 0     Sig: TAKE 1 TABLET(1 MG) BY MOUTH EVERY NIGHT AS NEEDED FOR SLEEP       There is no refill protocol information for this order        Recent Outpatient Visits              1 week ago Postoperative examination    Mirella Villagran MD    Office Visit    2 weeks ago Suleman Obrien MD    Office Visit    2 months ago Epidermal inclusion cyst    [address] [place] [address] Cherry Hashimoto, MD    Office Visit    5 months ago Dyslipidemia    [facility], [address], Merle Freddy, APRKATIE    Office Visit    6 months ago [facility]operative examination    [facility], [address], Mari Smith MD    Office Visit          Future Appointments         Provider Department Appt Notes    In 1 week [address]     In 1 week Taz London DO [address] [place] [address], [place] Acute pain of right shoulder (Per Dr.Patel SOW to add Pt on the schedule) - Ref By Gustavo Thakur  - [facility] COMM

## 2023-12-28 RX ORDER — ALPRAZOLAM 1 MG/1
1 TABLET ORAL NIGHTLY PRN
Qty: 15 TABLET | Refills: 0 | Status: SHIPPED | OUTPATIENT
Start: 2023-12-28

## 2023-12-29 ENCOUNTER — TELEPHONE (OUTPATIENT)
Dept: PULMONOLOGY | Facility: CLINIC | Age: 53
End: 2023-12-29

## 2023-12-29 NOTE — TELEPHONE ENCOUNTER
Pt got lung scan result and thought he didn't need to be seen in office anymore per Jt - so he cancelled appt for 1/2 - this appt has been taken now - asking if he can get refills without being seen - or be added back onto schedule

## 2024-01-03 ENCOUNTER — HOSPITAL ENCOUNTER (OUTPATIENT)
Dept: ULTRASOUND IMAGING | Age: 54
Discharge: HOME | End: 2024-01-03
Attending: INTERNAL MEDICINE
Payer: MEDICAID

## 2024-01-03 DIAGNOSIS — K76.0 FATTY LIVER DETERMINED BY BIOPSY: ICD-10-CM

## 2024-01-03 DIAGNOSIS — K74.00 HEPATIC FIBROSIS: ICD-10-CM

## 2024-01-03 PROCEDURE — 76705 ECHO EXAM OF ABDOMEN: CPT | Performed by: INTERNAL MEDICINE

## 2024-01-03 RX ORDER — TIOTROPIUM BROMIDE 18 UG/1
1 CAPSULE ORAL; RESPIRATORY (INHALATION) DAILY
Qty: 90 CAPSULE | Refills: 1 | Status: SHIPPED | OUTPATIENT
Start: 2024-01-03

## 2024-01-03 RX ORDER — ALPRAZOLAM 1 MG/1
1 TABLET ORAL NIGHTLY PRN
Qty: 15 TABLET | Refills: 0 | OUTPATIENT
Start: 2024-01-03

## 2024-01-03 NOTE — TELEPHONE ENCOUNTER
LOV: 12/20/22  FU: 1/5/24  LR: 11/27/23 with 5 refills    Spoke with Pharmacist at [facility] states they did not receive prescription from 11/27/23.    Jt BEJARANO - Please review/sign pended prescription.

## 2024-01-03 NOTE — TELEPHONE ENCOUNTER
Spoke with patient, follow up appointment scheduled with Jt BEJARANO on 1/5/24 at 12pm.  Verified date, time, location & parking, patient verbalized understanding.

## 2024-01-04 ENCOUNTER — ORDER TRANSCRIPTION (OUTPATIENT)
Dept: PHYSICAL THERAPY | Facility: HOSPITAL | Age: 54
End: 2024-01-04

## 2024-01-04 ENCOUNTER — OFFICE VISIT (OUTPATIENT)
Dept: PHYSICAL MEDICINE AND REHAB | Facility: CLINIC | Age: 54
End: 2024-01-04
Payer: MEDICAID

## 2024-01-04 ENCOUNTER — TELEPHONE (OUTPATIENT)
Facility: CLINIC | Age: 54
End: 2024-01-04

## 2024-01-04 ENCOUNTER — HOSPITAL ENCOUNTER (OUTPATIENT)
Dept: GENERAL RADIOLOGY | Facility: HOSPITAL | Age: 54
Discharge: HOME | End: 2024-01-04
Attending: PHYSICAL MEDICINE & REHABILITATION
Payer: MEDICAID

## 2024-01-04 VITALS — WEIGHT: 256 LBS | HEIGHT: 67 IN | BODY MASS INDEX: 40.18 KG/M2

## 2024-01-04 DIAGNOSIS — M12.811 ROTATOR CUFF TEAR ARTHROPATHY OF RIGHT SHOULDER: ICD-10-CM

## 2024-01-04 DIAGNOSIS — M75.101 ROTATOR CUFF SYNDROME OF RIGHT SHOULDER: Primary | ICD-10-CM

## 2024-01-04 DIAGNOSIS — M75.101 ROTATOR CUFF TEAR ARTHROPATHY OF RIGHT SHOULDER: ICD-10-CM

## 2024-01-04 DIAGNOSIS — M75.101 ROTATOR CUFF TEAR ARTHROPATHY OF RIGHT SHOULDER: Primary | ICD-10-CM

## 2024-01-04 DIAGNOSIS — M12.811 ROTATOR CUFF TEAR ARTHROPATHY OF RIGHT SHOULDER: Primary | ICD-10-CM

## 2024-01-04 PROCEDURE — 3008F BODY MASS INDEX DOCD: CPT | Performed by: PHYSICAL MEDICINE & REHABILITATION

## 2024-01-04 PROCEDURE — 99204 OFFICE O/P NEW MOD 45 MIN: CPT | Performed by: PHYSICAL MEDICINE & REHABILITATION

## 2024-01-04 PROCEDURE — 73030 X-RAY EXAM OF SHOULDER: CPT | Performed by: PHYSICAL MEDICINE & REHABILITATION

## 2024-01-04 RX ORDER — METHYLPREDNISOLONE 4 MG/1
TABLET ORAL
Qty: 1 EACH | Refills: 0 | Status: SHIPPED | OUTPATIENT
Start: 2024-01-04

## 2024-01-04 NOTE — TELEPHONE ENCOUNTER
----- Message from Fernando Whitehead MD sent at 1/4/2024  4:42 PM CST -----  Please see the following KOEZYt message sent to the patient:    \"Hi Al,    The liver ultrasound reveals a fatty liver as expected.  There were no other abnormalities noted and no sign of any liver tumors.  Good news.    The ultrasound should be repeated in 6 months.    Please repeat your blood work in February 2024.    Please let me know if you have any questions.    Dr. Whitehead\"    GI RNs: Please enter liver ultrasound recall for 6 months.

## 2024-01-04 NOTE — PATIENT INSTRUCTIONS
-Medrol dose pack to be started today  -Start PT and home exercises  -Ice daily 3-4 x for 10-15 minutes  -Xray of the shoulder today  -Follow up in 4 weeks

## 2024-01-05 ENCOUNTER — OFFICE VISIT (OUTPATIENT)
Dept: PULMONOLOGY | Facility: CLINIC | Age: 54
End: 2024-01-05
Payer: MEDICAID

## 2024-01-05 VITALS
BODY MASS INDEX: 40.34 KG/M2 | RESPIRATION RATE: 16 BRPM | SYSTOLIC BLOOD PRESSURE: 159 MMHG | DIASTOLIC BLOOD PRESSURE: 79 MMHG | OXYGEN SATURATION: 95 % | WEIGHT: 257 LBS | HEART RATE: 65 BPM | HEIGHT: 67 IN

## 2024-01-05 DIAGNOSIS — J44.9 CHRONIC OBSTRUCTIVE PULMONARY DISEASE, UNSPECIFIED COPD TYPE (HCC): ICD-10-CM

## 2024-01-05 DIAGNOSIS — G47.33 OSA ON CPAP: Primary | ICD-10-CM

## 2024-01-05 DIAGNOSIS — Z72.0 TOBACCO ABUSE: ICD-10-CM

## 2024-01-05 PROCEDURE — 3077F SYST BP >= 140 MM HG: CPT | Performed by: PHYSICIAN ASSISTANT

## 2024-01-05 PROCEDURE — 3078F DIAST BP <80 MM HG: CPT | Performed by: PHYSICIAN ASSISTANT

## 2024-01-05 PROCEDURE — 3008F BODY MASS INDEX DOCD: CPT | Performed by: PHYSICIAN ASSISTANT

## 2024-01-05 PROCEDURE — 99214 OFFICE O/P EST MOD 30 MIN: CPT | Performed by: PHYSICIAN ASSISTANT

## 2024-01-05 NOTE — PROGRESS NOTES
Pulmonary Progress Note    History of Present Illness:  Robert Yin is a 53 year old male presenting to pulmonary clinic today for follow-up.    He has mild MADELIN which becomes severe in REM. He uses CPAP nightly and notes he cannot sleep without. He received new Smith CPAP machine after prior machine was recalled. Unable to facilitate data download as he needs to provide medical device company with his new serial number.    He remains on Advair and Spiriva and is doing well. He uses albuterol couple of times per day with improvement. Respiratory symptoms are stable. He has dyspnea on exertion but no shortness of breath at rest. He has chronic cough and wheezing which are unchanged. No hospitalizations or oral steroid use in the last year. He continues to smoke 5-10 cigarettes/day. He wants to quit but feels defeated as he has tried many cessation methods in the past. Did not tolerate varenicline due to vivid dreams. Was not successful with nicotine patch, nicotine lozenge, or bupropion. He is getting low dose CT screenings, last one was 2 weeks ago and showed no signs of lung cancer.    Social History: Current smoker (5-10 cigarettes/day with 30 pyh)    Medications: has a current medication list which includes the following prescription(s): methylprednisolone, tiotropium, alprazolam, metoprolol tartrate, fluticasone-salmeterol, escitalopram, lisinopril, albuterol, amlodipine, turmeric, ascorbic acid, ipratropium-albuterol, repatha sureclick, aspirin, omega-3 fatty acids, and thera/beta-carotene.    Review of Systems:   Constitutional: No fever or chills. No weight loss or weight gain.  HEENT: No congestion or postnasal drip.  Cardio: No chest pain.  Respiratory: See HPI.  GI: No acid reflux.  Extremities: No lower extremity swelling or pain.   Neurologic: +Vertigo. No headache.  Skin: No rash.  Psych: No depression.     Physical Exam:  /79   Pulse 65   Resp 16   Ht 5' 7\" (1.702 m)   Wt 257 lb (116.6  kg)   SpO2 95%   BMI 40.25 kg/m²    Constitutional: Obese. No acute distress.   HEENT: Head NC/AT. PEERL. No tonsillar or uvula enlargement.   Cardio: Regular rate and rhythm. Normal S1 and S2. No murmurs.   Respiratory: Thorax symmetrical with no labored breathing. Diminished breath sounds bilaterally but otherwise clear to ausculation bilaterally. No wheezing, rhonchi, or crackles.   Extremities: No clubbing or cyanosis. No LE edema. No calf tenderness.  Neurologic: A&Ox3. No gross motor deficits.  Skin: Warm, dry.  Psych: Calm, cooperative. Pleasant affect.    Results:  -PFTs 3/2022: Very severe obstructive defect with significant bronchodilator response.   -LDCT screening 12/2023: No signs of lung cancer. Recommend next exam 12/2024.    Assessment/Plan:  MADELIN - doing well. Unable to facilitate data download for review of efficacy and compliance.   Plan:  -Patient to provide [facility] with serial number of his new CPAP device and request data download to be faxed to our office for review  -Vigilance with CPAP nightly  -Weight loss  -Avoid alcohol and sedating drugs  -Never drive if sleepy    Severe COPD - stable and no exacerbations. On triple inhaled therapy. Candidate for pulmonary rehab but unable to due to significant vertigo.  Plan:  -Continue ICS/LABA (Wixela)  -Continue LAMA (Spiriva)  -Albuterol MDI or DuoNebs as needed  -Recommend influenza, COVID, and pneumonia vaccines - declines    Tobacco abuse - still smoking. Needs to quit completely. Has tried [place] tobacco quit line, bupropion, NRT, and varenicline in past. Did not tolerate varenicline. Declining nicotine patch but agreeable to try nicotine inhaler. LDCT 12/2023 with no signs of lung cancer, due in 1 year.  Plan:  -Smoking cessation  -Trial of nicotine inhaler  -Annual LDCT screening program - due 12/2024    Jt Oshea PA-C  Pulmonary Medicine  1/5/2024

## 2024-01-05 NOTE — PATIENT INSTRUCTIONS
Please call [facility] to provide them with the serial number on your new CPAP device. Please ask [facility] to fax our office a data download from your CPAP device.     How is the nicotine oral inhaler different than an e-cigarette? While the nicotine inhaler might seem a bit like an e-cigarette, there are some important differences. The nicotine inhaler has been an FDA-approved quit-smoking medicine for more than 20 years. The nicotine oral inhaler is regulated, and science has shown it is both safe and effective. Also, nicotine inhaled through the nicotine oral inhaler is not intended to be inhaled into the lungs--it is designed to be delivered into the mouth and throat area where it is absorbed. In contrast, e-cigarettes are tobacco products that are not FDA-approved as quit-smoking medicines and the long-term health effects of e-cigarette use are unknown.     The nicotine oral inhaler may look similar to a cigarette, but you need to use it differently. You do not need to inhale as deeply for the medicine to work. Do not inhale into the lungs like a cigarette, and do not use it like a traditional asthma inhaler. Instead, take frequent, short, and shallow puffs from the inhaler, similar to how you might sip from a straw. Use the inhaler for four 5-minute sessions, or continually for about 20 minutes (which is about how long one cartridge will last). You can use up to 6 cartridges per day.

## 2024-01-08 RX ORDER — TIOTROPIUM BROMIDE 18 UG/1
1 CAPSULE ORAL; RESPIRATORY (INHALATION) DAILY
Qty: 30 CAPSULE | Refills: 5 | OUTPATIENT
Start: 2024-01-08

## 2024-01-10 ENCOUNTER — OFFICE VISIT (OUTPATIENT)
Dept: PHYSICAL THERAPY | Age: 54
End: 2024-01-10
Attending: PHYSICAL MEDICINE & REHABILITATION
Payer: MEDICAID

## 2024-01-10 DIAGNOSIS — M12.811 ROTATOR CUFF TEAR ARTHROPATHY OF RIGHT SHOULDER: ICD-10-CM

## 2024-01-10 DIAGNOSIS — M75.101 ROTATOR CUFF SYNDROME OF RIGHT SHOULDER: Primary | ICD-10-CM

## 2024-01-10 DIAGNOSIS — M75.101 ROTATOR CUFF TEAR ARTHROPATHY OF RIGHT SHOULDER: ICD-10-CM

## 2024-01-10 PROCEDURE — 97110 THERAPEUTIC EXERCISES: CPT

## 2024-01-10 PROCEDURE — 97140 MANUAL THERAPY 1/> REGIONS: CPT

## 2024-01-10 PROCEDURE — 97162 PT EVAL MOD COMPLEX 30 MIN: CPT

## 2024-01-10 NOTE — PROGRESS NOTES
SHOULDER EVALUATION:     Diagnosis:   Rotator cuff syndrome of right shoulder (M75.101)  Rotator cuff tear arthropathy of right shoulder (M75.101,M12.811)      Referring Provider: Marbin  Date of Evaluation:    1/10/2024    Precautions:  None Next MD visit:   none scheduled  Date of Surgery: n/a     PATIENT SUMMARY   Robert Yin is a 53 year old male who presents to therapy today with complaints of right shoulder pain that started about 1 month.  He states that his shoulder pain started after compensating by leaning on his right shoulder after getting a ingrown removed in the left glute region. He states that his pain came on gradually.  He states that lifting his arm overhead, lifting, and cooking aggravates his shoulder increases his pain to a 5-6/10.  He states that his pain is a sharp stabbing pain that decreases after about 15 minutes. He is currently on disability due to a history of vertigo, but he is limited in many of his household chores.    Pt describes pain level current 2/10, at best 2/10, at worst 5-6/10.   Current functional limitations include overhead lifting, reaching, carrying objects.   Robert describes prior level of function IND with mild pain. Pt goals include to decrease pain and increase function.  Past medical history was reviewed with Robert. Significant findings include   Past Medical History:   Diagnosis Date    Anxiety state     CAD (coronary artery disease) 2013    COPD (chronic obstructive pulmonary disease) (HCC)     Disorder of liver     enlarged liver    Diverticula of intestine     Essential hypertension     Heart disease 10/14/2022    High blood pressure     High cholesterol     History of blood transfusion     No reaction    Hx of motion sickness     Vertigo    Hyperlipidemia     Hypertension     Hypertriglyceridemia     Lipid screening 06/23/2014    MADELIN on CPAP     Prediabetes     Sleep apnea     CPAP    Tobacco dependence     Umbilical hernia 2008    Visual  impairment     Glasses       ASSESSMENT  Robert presents to physical therapy evaluation with primary c/o right pain and mobility deficits. The results of the objective tests and measures show limited shoulder AROM, decreased shoulder strength, AC joint tenderness, hypomobility and pain at AC joint PAIVM mobility, GH joint inferior glide hypomobility, decreased scapular strength, and chronic poor posture.  Functional deficits include but are not limited to lifting, reaching, carrying, pushing, pulling, and sleeping.  Signs and symptoms are consistent with diagnosis of AC joint OA. Pt and PT discussed evaluation findings, pathology, POC and HEP.  Pt voiced understanding and performs HEP correctly without reported pain. Skilled Physical Therapy is medically necessary to address the above impairments and reach functional goals.     OBJECTIVE:   Observation/Posture: rounded shoulder, forward head    Palpation: TTP at AC joint    Sensation: unremarkable    Cervical Screen:   - pain with cervical extension to right upper trap region, limited 25%  - pain with cervical right rotation, limited 25%    AROM: (* denotes performed with pain)  Shoulder  Elbow   Flexion: R limited 25%*; L wnl  Abduction: R limited 25%*; L limited 15*  ER: R limited 25%*; L limited 25%  IR: R T10; L L5 Flexion: R nl; L nl  Extension: R nl; L nl  Supination: R nl, L nl  Pronation: R nl, L nl     Accessory motion: hypomobility noted with inferior glide to AC joint    Strength/MMT: (* denotes performed with pain)  Shoulder Elbow Scapular   Flexion: R 4/5; L 5/5  Abduction: R 4/5; L 5/5  ER: R 5/5; L 5/5  IR: R 5/5; L 5/5 Flexion: R 5/5; L 5/5  Extension: R 5/5; L 5/5  Supination: R 5/5; L 5/5  Pronation: R 5/5; L 5/5  Rhomboids: R nt/5, L nt/5  Mid trap: R nt/5; L nt/5   Lats: R nt/5, L nt/5  Low trap: R nt/5; L nt/5     Special tests:   - (+) painful arc    Today’s Treatment and Response:   Pt education was provided on exam findings, treatment  diagnosis, treatment plan, expectations, and prognosis. Pt was also provided recommendations for activity modifications, possible soreness after evaluation, postural corrections, ergonomics, pain science education , detrimental fear avoidance behaviors, and importance of remaining active.  Performed AP glide and AC joint, grade III-, several minutes, decreased pain with shoulder elevation. Performed table shoulder flexion and pendulum exercise, 1x10, added to HEP    Charges: PT Eval Moderate Complexity, MT 1, TE 1      Total Timed Treatment: 45 min     Total Treatment Time: 45 min     Based on clinical rationale and outcome measures, this evaluation involved Moderate Complexity decision making due to 3+ personal factors/comorbidities, 3 body structures involved/activity limitations, and unstable symptoms including changing pain levels.  PLAN OF CARE:    Goals: (to be met in 10 visits)   Pt will report improved ability to sleep without waking due to shoulder pain   Pt will improve shoulder flexion AROM to >160 degrees to be able to reach into overhead cabinets without pain or restriction   Pt will improve shoulder abduction AROM to >160 degrees to improve ability to don deodorant, don/doff shirts, and wash hair   Pt will increase shoulder AROM ER to 75 to be able to reach and fasten seatbelt   Pt will increase shoulder AROM IR to T5 to be able to reach in back pocket, tuck in shirt, and turn steering wheel without pain  Pt will improve shoulder strength throughout to 4+/5 to improve function with household activities   Pt will demonstrate increased mid/low trap strength to 4/5 to promote improved shoulder mechanics and stabilization with lifting and reaching   Pt will be independent and compliant with comprehensive HEP to maintain progress achieved in PT       Frequency / Duration: Patient will be seen for 2 x/week or a total of 8-10 visits over a 90 day period. Treatment will include: Manual Therapy, Neuromuscular  Re-education, Therapeutic Activities, Therapeutic Exercise, and Home Exercise Program instruction    Education or treatment limitation: None  Rehab Potential:good    QuickDASH Outcome Score  Score: 15.91 % (1/4/2024  2:30 PM)      Patient/Family/Caregiver was advised of these findings, precautions, and treatment options and has agreed to actively participate in planning and for this course of care.    Thank you for your referral. Please co-sign or sign and return this letter via fax as soon as possible to [phone]. If you have any questions, please contact me at Dept: [phone]    Sincerely,  Electronically signed by therapist: Jose French PT  Physician's certification required: Yes  I certify the need for these services furnished under this plan of treatment and while under my care.    X___________________________________________________ Date____________________    Certification From: 1/10/2024  To:4/9/2024

## 2024-01-12 NOTE — PROGRESS NOTES
[facility]  NEW PATIENT EVALUATION    Consultation as a request of Dr. Flor      HISTORY OF PRESENT ILLNESS:     Chief Complaint   Patient presents with    Shoulder Pain     New right handed patient comes in for right lateral and anterior shoulder pain, localized.  Pain started 2 months ago after surgery on glute, has been adding pressure on arm. No Tx. Rates pain 6/10.       The patient is a 53 year old male with significant past medical history of CAD, COPD, anxiety, hypertension, hyperlipidemia, prediabetes, sleep apnea, tobacco dependence, umbilical hernia who presents with right shoulder pain.  Pain has been ongoing for the last 2 months.  Pain is located along the posterolateral aspect of the shoulder.  Pain is worse with overhead activity.  She rates pain to be 6 out of 10.  He has not had any dedicated treatment for this.  He denies any neck pain or radiating symptoms in the hand any numbness tingling or weakness.      PHYSICAL EXAM:   Ht 67\"   Wt 256 lb (116.1 kg)   BMI 40.10 kg/m²       SHOULDER: Pertinent positives highlighted in bold    Inspection    Skin No evidence of eythema, warmth, bruising, abrasions, deformity or drooping about bilateral upper extremities   Atrophy No evidence of muscular atrophy   Range of Motion Decreased on the involved side                   Palpation      Tenderness TTP of the greater tuberosity, bicipital groove, sternoclavicular joint, clavicle, acromioclavicular joint, coracoid and periscapular area   Crepitance Negative palpable/audible subacromial, glenohumeral, and scapulothoracic crepitance   Rotator Cuff    Strength 4/5 empty can; 5/5 ER; 5/5 IR   Impingement Positive Corbett  Positive Neers   ABD to 90 with resisted ER without pain (subacromial)  ABD to 90 with resisted IR without pain/weak (intra-articular)       Biceps/Labral Negative Speed's  Negative Trenton's   Negative Yergason      AC Joint Negative cross  arm adduction     Stability Negative sulcus, AP translation, apprehension         IMAGING:     X-ray right shoulder completed 1/4/2024 was personally reviewed which is notable for moderate degenerative narrowing of the AC joint    All imaging results were reviewed and discussed with patient.      ASSESSMENT/PLAN:     1. Rotator cuff tear arthropathy of right shoulder        Robert Yin is a pleasant 53-year-old male presenting today for evaluation of right shoulder pain with rotator cuff tendinopathy and likely a tear.  I recommended starting PT program with home exercises.  I have prescribed a Medrol Dosepak given the severity of his pain and symptoms.  I have reviewed his x-ray imaging today.  I have advised him to use ice daily for 10 to 15 minutes.  I have recommended follow-up in 4 weeks if pain is no better we will consider ultrasound-guided subacromial bursa injection with corticosteroid.      The patient verbalized understanding with the plan and was in agreement. All questions/concerns were addressed and there were no barriers to learning.  Please note Dragon dictation software was used to dictate this note and can result in inadvertent typos.    Boston Zazueta D.O. FAAPMR & CAQSM  Physical Medicine and Rehabilitation  Sports and Spine Medicine      PAST MEDICAL HISTORY:     Past Medical History:   Diagnosis Date    Anxiety state     CAD (coronary artery disease) 2013    COPD (chronic obstructive pulmonary disease) (HCC)     Disorder of liver     enlarged liver    Diverticula of intestine     Essential hypertension     Heart disease 10/14/2022    High blood pressure     High cholesterol     History of blood transfusion     No reaction    Hx of motion sickness     Vertigo    Hyperlipidemia     Hypertension     Hypertriglyceridemia     Lipid screening 06/23/2014    MADELIN on CPAP     Prediabetes     Sleep apnea     CPAP    Tobacco dependence     Umbilical hernia 2008    Visual impairment     Glasses          PAST SURGICAL HISTORY:     Past Surgical History:   Procedure Laterality Date    ANESTH,SURGERY OF SHOULDER Left     BYPASS SURGERY  01/2014    CABG      COLONOSCOPY  09/01/2016        COLONOSCOPY      COLONOSCOPY N/A 04/11/2022    Procedure: COLONOSCOPY;  Surgeon: Raj Greenberg MD;  Location: [facility] ENDOSCOPY    ELECTROCARDIOGRAM, COMPLETE  01/18/2014    scanned to media tab    TONSILLECTOMY  1975    UMBILICAL HERNIA REPAIR           CURRENT MEDICATIONS:     Current Outpatient Medications   Medication Sig Dispense Refill    methylPREDNISolone 4 MG Oral Tablet Therapy Pack As directed 1 each 0    tiotropium (SPIRIVA HANDIHALER) 18 MCG Inhalation Cap Inhale 1 capsule (18 mcg total) into the lungs daily. 90 capsule 1    ALPRAZolam 1 MG Oral Tab Take 1 tablet (1 mg total) by mouth nightly as needed for Sleep. 15 tablet 0    metoprolol tartrate 50 MG Oral Tab Take 1 tablet (50 mg total) by mouth 2 (two) times daily. (Patient taking differently: Take 0.5 tablets (25 mg total) by mouth 2 (two) times daily.) 180 tablet 1    fluticasone-salmeterol (WIXELA INHUB) 500-50 MCG/ACT Inhalation Aerosol Powder, Breath Activated Inhale 1 puff into the lungs 2 (two) times daily. 60 each 3    escitalopram 10 MG Oral Tab Take 1 tablet (10 mg total) by mouth daily. 90 tablet 3    lisinopril 30 MG Oral Tab Take 1 tablet (30 mg total) by mouth daily. 90 tablet 3    albuterol 108 (90 Base) MCG/ACT Inhalation Aero Soln INHALE 2 PUFFS BY MOUTH EVERY 6 HOURS AS NEEDED FOR SHORTNESS OF BREATH OR WHEEZING 8.5 g 5    amLODIPine 5 MG Oral Tab TAKE 1 TABLET(5 MG) BY MOUTH DAILY 90 tablet 3    Turmeric (QC TUMERIC COMPLEX) 500 MG Oral Cap Take by mouth.      ascorbic acid 1000 MG Oral Tab Take 1 tablet (1,000 mg total) by mouth daily.      ipratropium-albuterol 0.5-2.5 (3) MG/3ML Inhalation Solution Take 3 mL by nebulization every 6 (six) hours as needed. 180 mL 2    REPATHA SURECLICK 140 MG/ML Subcutaneous Solution Auto-injector  Inject twice monthly      aspirin 81 MG Oral Tab EC Take 1 tablet (81 mg total) by mouth daily. 90 tablet 1    omega-3 fatty acids 1000 MG Oral Cap Take 3,000 mg by mouth daily.      Multiple Vitamin (MULTI-VITAMINS) Oral Tab Take  by mouth.      Nicotine 10 MG Inhalation Inhalant Puff the inhaler for four 5-minute sessions, or continually for about 20 minutes (which is about how long one cartridge will last). You can use up to 6 cartridges per day. 168 each 0         ALLERGIES:     Allergies   Allergen Reactions    Neosporin [Neomycin-Bacitracin-Polymyxin] RASH         FAMILY HISTORY:     Family History   Problem Relation Age of Onset    Hypertension Father     Heart Attack Father 44    Heart Disorder Father         AMI    Heart Disorder Mother     Diabetes Mother     Dementia Mother         She had it when she turned 89    Other (Alzheimer's Disease) Mother           SOCIAL HISTORY:     Social History     Socioeconomic History    Marital status:    Tobacco Use    Smoking status: Every Day     Packs/day: 1.00     Years: 30.00     Additional pack years: 0.00     Total pack years: 30.00     Types: Cigarettes     Passive exposure: Current    Smokeless tobacco: Former    Tobacco comments:     1/2024: 5-10 cigarettes/day   Vaping Use    Vaping Use: Former    Substances: Nicotine    Devices: Disposable   Substance and Sexual Activity    Alcohol use: Yes     Alcohol/week: 3.0 standard drinks of alcohol     Types: 3 Standard drinks or equivalent per week     Comment: 3-4 drinks 2 times a week    Drug use: Yes     Types: Cannabis     Comment: occasional   Other Topics Concern    Caffeine Concern Yes     Comment: coffee, 2 cups/day    Reaction to local anesthetic No    Pt has a pacemaker No    Pt has a defibrillator No          REVIEW OF SYSTEMS:   No patient-reported data collected this visit.       PHYSICAL EXAM:     General: No immediate distress  Head: Normocephalic/ Atraumatic  Eyes: Extra-occular movements  intact.   Ears: No auricular hematoma or deformities  Mouth: No lesions or ulcerations  Heart: peripheral pulses intact. Normal capillary refill.   Lungs: Non-labored respirations  Abdomen: No abdominal guarding  Extremities: No lower extremity edema bilaterally   Skin: No lesions noted   Cognition: alert & oriented x 3, attentive, able to follow 2 step commands, comprehention intact, spontaneous speech intact  Psychiatric: Mood and affect appropriate    LABS:     Lab Results   Component Value Date     04/12/2023    A1C 5.9 (H) 04/12/2023     Lab Results   Component Value Date    WBC 6.9 05/01/2023    RBC 4.58 05/01/2023    HGB 14.5 05/01/2023    HCT 44.1 05/01/2023    MCV 96.3 05/01/2023    MCH 31.7 05/01/2023    MCHC 32.9 05/01/2023    RDW 13.2 05/01/2023    .0 (L) 05/01/2023    MPV 8.4 08/18/2017     Lab Results   Component Value Date     (H) 05/01/2023    BUN 13 05/01/2023    BUNCREA 13.4 05/01/2023    CREATSERUM 0.97 05/01/2023    ANIONGAP 6 05/01/2023    GFRNAA 95 03/08/2022    GFRAA 110 03/08/2022    CA 9.2 05/01/2023    OSMOCALC 292 05/01/2023    ALKPHO 82 11/07/2023    AST 41 (H) 11/07/2023     (H) 11/07/2023    ALKPHOS 104 (H) 03/15/2016    BILT 0.6 11/07/2023    TP 7.2 11/07/2023    ALB 4.7 11/07/2023    GLOBULIN 3.6 04/10/2023    AGRATIO 1.5 03/15/2016     05/01/2023    K 3.7 05/01/2023     05/01/2023    CO2 27.0 05/01/2023     Lab Results   Component Value Date    PTP 12.5 04/10/2023    INR 0.93 04/10/2023     No results found for: \"VITD\", \"QVITD\", \"PQDT57GX\"

## 2024-01-18 ENCOUNTER — OFFICE VISIT (OUTPATIENT)
Dept: PHYSICAL THERAPY | Age: 54
End: 2024-01-18
Attending: PHYSICAL MEDICINE & REHABILITATION
Payer: MEDICAID

## 2024-01-18 PROCEDURE — 97110 THERAPEUTIC EXERCISES: CPT

## 2024-01-18 PROCEDURE — 97140 MANUAL THERAPY 1/> REGIONS: CPT

## 2024-01-18 NOTE — PROGRESS NOTES
Diagnosis:   Rotator cuff syndrome of right shoulder (M75.101)  Rotator cuff tear arthropathy of right shoulder (M75.101,M12.811)       Referring Provider: Marbin  Date of Evaluation:    1/10/2024    Precautions:  None Next MD visit:   none scheduled  Date of Surgery: n/a   Insurance Primary/Secondary: [employer]ICA[employer] / N/A     # Auth Visits: 10 visits            Subjective: pt states that after therapy last session his shoulder pain was mild to minimal.      Pain: 5-6/10      Objective:   - TTP at AC joint  - hypomobility with AC joint AP and inferior glide  - Pain with shoulder flexion at 90 deg      Assessment: Pt improved pain free shoulder flexion to 120 deg post manual therapy.  Added self AC joint mobilization to HEP for pain modulation      Goals: (to be met in 10 visits)   Pt will report improved ability to sleep without waking due to shoulder pain   Pt will improve shoulder flexion AROM to >160 degrees to be able to reach into overhead cabinets without pain or restriction   Pt will improve shoulder abduction AROM to >160 degrees to improve ability to don deodorant, don/doff shirts, and wash hair   Pt will increase shoulder AROM ER to 75 to be able to reach and fasten seatbelt   Pt will increase shoulder AROM IR to T5 to be able to reach in back pocket, tuck in shirt, and turn steering wheel without pain  Pt will improve shoulder strength throughout to 4+/5 to improve function with household activities   Pt will demonstrate increased mid/low trap strength to 4/5 to promote improved shoulder mechanics and stabilization with lifting and reaching   Pt will be independent and compliant with comprehensive HEP to maintain progress achieved in PT        Plan: continue with manual therapy for pain modulation  Date: 1/18/2024  TX#: 2/10 Date:                 TX#: 3/ Date:                 TX#: 4/ Date:                 TX#: 5/ Date:   Tx#: 6/   MT  - DFR at right AC joint  - AC joint ap glide, grade III, several  minutes  - AC joint inferior glide grade III, several minutes       TE  - Supine shoulder active ER with AC joint sustained AP pressure, 3x10  - Self AC joint mobilization using bed sheet, 1x10                     HEP:   - Self AC joint mobilization using bed sheet, 1x10  - pendulum  - table shoulder flexion slide     Charges: MT 3, TE 1       Total Timed Treatment: MT 38 min, TE 8 min  Total Treatment Time: 46 min

## 2024-01-25 ENCOUNTER — OFFICE VISIT (OUTPATIENT)
Dept: PHYSICAL THERAPY | Age: 54
End: 2024-01-25
Attending: PHYSICAL MEDICINE & REHABILITATION
Payer: MEDICAID

## 2024-01-25 PROCEDURE — 97110 THERAPEUTIC EXERCISES: CPT

## 2024-01-25 PROCEDURE — 97140 MANUAL THERAPY 1/> REGIONS: CPT

## 2024-01-25 NOTE — PROGRESS NOTES
Diagnosis:   Rotator cuff syndrome of right shoulder (M75.101)  Rotator cuff tear arthropathy of right shoulder (M75.101,M12.811)       Referring Provider: Marbin  Date of Evaluation:    1/10/2024    Precautions:  None Next MD visit:   none scheduled  Date of Surgery: n/a   Insurance Primary/Secondary: BLUE CROSS MEDICAID / N/A     # Auth Visits: 10 visits            Subjective: pt states his symptoms are about the same with pain with sleeping.      Pain: 5-6/10      Objective:   - Shoulder AROM:  Flexion 135, abduction 125*  - TTP at AC joint  - hypomobility with AC joint AP and inferior glide  - Pain with shoulder flexion at 90 deg      Assessment: Pt demonstrates improved shoulder mobility, but pain is about the same. Initiated resisted shoulder and scapular exercises tolerance to build up tissue tolerance and for graded exposure as patient has fear avoidant tendencies.       Goals: (to be met in 10 visits)   Pt will report improved ability to sleep without waking due to shoulder pain   Pt will improve shoulder flexion AROM to >160 degrees to be able to reach into overhead cabinets without pain or restriction   Pt will improve shoulder abduction AROM to >160 degrees to improve ability to don deodorant, don/doff shirts, and wash hair   Pt will increase shoulder AROM ER to 75 to be able to reach and fasten seatbelt   Pt will increase shoulder AROM IR to T5 to be able to reach in back pocket, tuck in shirt, and turn steering wheel without pain  Pt will improve shoulder strength throughout to 4+/5 to improve function with household activities   Pt will demonstrate increased mid/low trap strength to 4/5 to promote improved shoulder mechanics and stabilization with lifting and reaching   Pt will be independent and compliant with comprehensive HEP to maintain progress achieved in PT        Plan: continue with manual therapy for pain modulation  Date: 1/18/2024  TX#: 2/10 Date: 1/25/24               TX#: 3/10 Date:                  TX#: 4/ Date:                 TX#: 5/ Date:   Tx#: 6/   MT  - DFR at right AC joint  - AC joint ap glide, grade III, several minutes  - AC joint inferior glide grade III, several minutes MT  - AC joint inferior glide grade III, several minutes  - MWM, AC joint sustained inferior glide with active shoulder abduction      TE  - Supine shoulder active ER with AC joint sustained AP pressure, 3x10  - Self AC joint mobilization using bed sheet, 1x10 TE  - UBE, fwd 8'  - TB rows, red TB, 3x10  - Weight bar over head pull over, #9, 3x10  - Sidelying shoulder abduction, #2, 3x10                    HEP:   - Self AC joint mobilization using bed sheet, 1x10  - pendulum  - table shoulder flexion slide     Charges: MT 1, TE 3       Total Timed Treatment: MT 8 min, TE 38 min  Total Treatment Time: 46 min

## 2024-01-29 ENCOUNTER — OFFICE VISIT (OUTPATIENT)
Dept: PHYSICAL THERAPY | Age: 54
End: 2024-01-29
Attending: PHYSICAL MEDICINE & REHABILITATION
Payer: MEDICAID

## 2024-01-29 PROCEDURE — 97110 THERAPEUTIC EXERCISES: CPT

## 2024-01-29 PROCEDURE — 97140 MANUAL THERAPY 1/> REGIONS: CPT

## 2024-01-29 NOTE — PROGRESS NOTES
Diagnosis:   Rotator cuff syndrome of right shoulder (M75.101)  Rotator cuff tear arthropathy of right shoulder (M75.101,M12.811)       Referring Provider: Marbin  Date of Evaluation:    1/10/2024    Precautions:  None Next MD visit:   none scheduled  Date of Surgery: n/a   Insurance Primary/Secondary: BLUE CROSS MEDICAID / N/A     # Auth Visits: 10 visits            Subjective: pt states his shoulder is still sore . \"It feels good  and the next day its worse, still has difficulty sleeping on my right side\"  Pain: 5-6/10      Objective:   - Shoulder AROM:  Flexion 135, abduction 125*  - TTP at AC joint  - hypomobility with AC joint AP and inferior glide  - Pain with shoulder flexion at 90 deg      Assessment: Pt demonstrates improved shoulder mobility, but pain is about the same. Mod- high irritability with  theraband exercises and stretches. UE traction with grade 2  GH mobilization performed to reliev pain and improve mobility. Patent educated on posture awareness to prevent undue stress on anterior aspect of GH joint. Initiated resisted shoulder and scapular exercises tolerance to build up tissue tolerance and for graded exposure as patient has fear avoidant tendencies.       Goals: (to be met in 10 visits)   Pt will report improved ability to sleep without waking due to shoulder pain   Pt will improve shoulder flexion AROM to >160 degrees to be able to reach into overhead cabinets without pain or restriction   Pt will improve shoulder abduction AROM to >160 degrees to improve ability to don deodorant, don/doff shirts, and wash hair   Pt will increase shoulder AROM ER to 75 to be able to reach and fasten seatbelt   Pt will increase shoulder AROM IR to T5 to be able to reach in back pocket, tuck in shirt, and turn steering wheel without pain  Pt will improve shoulder strength throughout to 4+/5 to improve function with household activities   Pt will demonstrate increased mid/low trap strength to 4/5 to promote  improved shoulder mechanics and stabilization with lifting and reaching   Pt will be independent and compliant with comprehensive HEP to maintain progress achieved in PT        Plan: continue with manual therapy for pain modulation  Date: 1/18/2024  TX#: 2/10 Date: 1/25/24               TX#: 3/10 Date: 01/29/2024             TX#: 4/10 Date:                 TX#: 5/ Date:   Tx#: 6/   MT  - DFR at right AC joint  - AC joint ap glide, grade III, several minutes  - AC joint inferior glide grade III, several minutes MT  - AC joint inferior glide grade III, several minutes  - MWM, AC joint sustained inferior glide with active shoulder abduction MT: 15 minutes  - Gh joint AP grade II to relieve pain and to umprove mobility   - MWM, AC joint sustained inferior glide with active shoulder abduction  - MFR to (R) biceps and UE distraction      TE  - Supine shoulder active ER with AC joint sustained AP pressure, 3x10  - Self AC joint mobilization using bed sheet, 1x10 TE  - UBE, fwd 8'  - TB rows, red TB, 3x10  - Weight bar over head pull over, #9, 3x10  - Sidelying shoulder abduction, #2, 3x10 TE: 30 minutes  - UBE, fwd 8'  - TB rows, green TB, 2 x 20  - TB pull apart, green ; 10 x 2  Horizontal adductor stretch: 5 x 1 ; 5 sec   Codman's; pendulum : 20 x 1 - Sidelying shoulder abduction, #2, 3x10  Scap retractions; 20 x 1; 3 sec                    HEP:   - Self AC joint mobilization using bed sheet, 1x10  - pendulum  - table shoulder flexion slide     Charges: MT 1, TE 2       Total Timed Treatment: MT 15 min, TE 30 min  Total Treatment Time: 45 min

## 2024-02-01 ENCOUNTER — OFFICE VISIT (OUTPATIENT)
Dept: PHYSICAL MEDICINE AND REHAB | Facility: CLINIC | Age: 54
End: 2024-02-01
Payer: MEDICAID

## 2024-02-01 VITALS — BODY MASS INDEX: 40.34 KG/M2 | WEIGHT: 257 LBS | HEIGHT: 67 IN

## 2024-02-01 DIAGNOSIS — M75.101 ROTATOR CUFF TEAR ARTHROPATHY OF RIGHT SHOULDER: Primary | ICD-10-CM

## 2024-02-01 DIAGNOSIS — M12.811 ROTATOR CUFF TEAR ARTHROPATHY OF RIGHT SHOULDER: Primary | ICD-10-CM

## 2024-02-01 DIAGNOSIS — M67.921 BICEPS TENDINOPATHY, RIGHT: ICD-10-CM

## 2024-02-01 PROCEDURE — 99214 OFFICE O/P EST MOD 30 MIN: CPT | Performed by: PHYSICAL MEDICINE & REHABILITATION

## 2024-02-01 NOTE — PATIENT INSTRUCTIONS
-MRI of the shoulder and follow up after  -Continue PT and home exercises  -Continue topical treatment  -Will discuss rotator cuff vs biceps injection after

## 2024-02-01 NOTE — PROGRESS NOTES
[facility]  OFFICE FOLLOW UP EVALUATION      HISTORY OF PRESENT ILLNESS:     Chief Complaint   Patient presents with    Shoulder Pain     LOV: 1/4/24 Patient f/u on localized right shoulder pain, denies N/T.  PT started w/o relief. Rates pain 6/10.       Patient is following up for shoulder pain.  He has been attending physical therapy and doing home exercises.  He still has weakness in the shoulder.  Pain is located along the lateral aspect but also anteriorly.  Pain is worsened with any movement of the shoulder in forward flexion or abduction.  He completed Medrol Dosepak and had a reaction to it.  He had flushing of the skin.  He notes some improvement.  Still rates pain to be 6 out of 10.    PHYSICAL EXAM:   Ht 67\"   Wt 257 lb (116.6 kg)   BMI 40.25 kg/m²     SHOULDER: Pertinent positives highlighted in bold     Inspection     Skin No evidence of eythema, warmth, bruising, abrasions, deformity or drooping about bilateral upper extremities   Atrophy No evidence of muscular atrophy   Range of Motion Decreased on the involved side                           Palpation      Tenderness TTP of the greater tuberosity, bicipital groove, sternoclavicular joint, clavicle, acromioclavicular joint, coracoid and periscapular area   Crepitance Negative palpable/audible subacromial, glenohumeral, and scapulothoracic crepitance   Rotator Cuff     Strength 4/5 empty can; 5/5 ER; 5/5 IR   Impingement Positive Corbett  Positive Neers   ABD to 90 with resisted ER without pain (subacromial)  ABD to 90 with resisted IR without pain/weak (intra-articular)       Biceps/Labral Negative Speed's  Negative Motley's   Negative Yergason       AC Joint Negative cross arm adduction      Stability Negative sulcus, AP translation, apprehension        IMAGING:     X-ray right shoulder completed 1/4/2024 was personally reviewed which is notable for moderate degenerative narrowing of the AC joint      All imaging results were reviewed and discussed with patient.      ASSESSMENT/PLAN:     1. Rotator cuff tear arthropathy of right shoulder    2. Biceps tendinopathy, right        Robert Yin is a 53 year old male following up for right shoulder pain with weakness.  I am concerned about a rotator cuff tear that may be full-thickness.  I recommended MRI imaging for further evaluation.  He does have pain in the biceps as well and suspected biceps tendon tear near the labrum.  Recommend he follow-up after MRI imaging of the shoulder is completed.  We will discuss further treatment options including possible ultrasound-guided subacromial bursa injection versus biceps tendon sheath injection.  He may need orthopedic evaluation as well.  Recommend he continue topical treatment and over-the-counter NSAID as needed.      The patient verbalized understanding with the plan and was in agreement. All questions/concerns were addressed and there were no barriers to learning.  Please note Dragon dictation software was used to dictate this note and may result in inadvertent typos.    Boston Zazueta DO, FAAPMR & CAQSM  Physical Medicine and Rehabilitation  Sports and Spine Medicine    PAST MEDICAL HISTORY:     Past Medical History:   Diagnosis Date    Anxiety state     CAD (coronary artery disease) 2013    COPD (chronic obstructive pulmonary disease) (HCC)     Disorder of liver     enlarged liver    Diverticula of intestine     Essential hypertension     Heart disease 10/14/2022    High blood pressure     High cholesterol     History of blood transfusion     No reaction    Hx of motion sickness     Vertigo    Hyperlipidemia     Hypertension     Hypertriglyceridemia     Lipid screening 06/23/2014    MADELIN on CPAP     Prediabetes     Sleep apnea     CPAP    Tobacco dependence     Umbilical hernia 2008    Visual impairment     Glasses         PAST SURGICAL HISTORY:     Past Surgical History:   Procedure Laterality Date     ANESTH,SURGERY OF SHOULDER Left     BYPASS SURGERY  01/2014    CABG      COLONOSCOPY  09/01/2016        COLONOSCOPY      COLONOSCOPY N/A 04/11/2022    Procedure: COLONOSCOPY;  Surgeon: Raj Greenberg MD;  Location: [facility] ENDOSCOPY    ELECTROCARDIOGRAM, COMPLETE  01/18/2014    scanned to media tab    TONSILLECTOMY  1975    UMBILICAL HERNIA REPAIR           CURRENT MEDICATIONS:     Current Outpatient Medications   Medication Sig Dispense Refill    Nicotine 10 MG Inhalation Inhalant Puff the inhaler for four 5-minute sessions, or continually for about 20 minutes (which is about how long one cartridge will last). You can use up to 6 cartridges per day. 168 each 0    tiotropium (SPIRIVA HANDIHALER) 18 MCG Inhalation Cap Inhale 1 capsule (18 mcg total) into the lungs daily. 90 capsule 1    ALPRAZolam 1 MG Oral Tab Take 1 tablet (1 mg total) by mouth nightly as needed for Sleep. 15 tablet 0    metoprolol tartrate 50 MG Oral Tab Take 1 tablet (50 mg total) by mouth 2 (two) times daily. (Patient taking differently: Take 0.5 tablets (25 mg total) by mouth 2 (two) times daily.) 180 tablet 1    fluticasone-salmeterol (WIXELA INHUB) 500-50 MCG/ACT Inhalation Aerosol Powder, Breath Activated Inhale 1 puff into the lungs 2 (two) times daily. 60 each 3    escitalopram 10 MG Oral Tab Take 1 tablet (10 mg total) by mouth daily. 90 tablet 3    lisinopril 30 MG Oral Tab Take 1 tablet (30 mg total) by mouth daily. 90 tablet 3    albuterol 108 (90 Base) MCG/ACT Inhalation Aero Soln INHALE 2 PUFFS BY MOUTH EVERY 6 HOURS AS NEEDED FOR SHORTNESS OF BREATH OR WHEEZING 8.5 g 5    amLODIPine 5 MG Oral Tab TAKE 1 TABLET(5 MG) BY MOUTH DAILY 90 tablet 3    Turmeric (QC TUMERIC COMPLEX) 500 MG Oral Cap Take by mouth.      ascorbic acid 1000 MG Oral Tab Take 1 tablet (1,000 mg total) by mouth daily.      ipratropium-albuterol 0.5-2.5 (3) MG/3ML Inhalation Solution Take 3 mL by nebulization every 6 (six) hours as needed. 180 mL 2     REPATHA SURECLICK 140 MG/ML Subcutaneous Solution Auto-injector Inject twice monthly      aspirin 81 MG Oral Tab EC Take 1 tablet (81 mg total) by mouth daily. 90 tablet 1    omega-3 fatty acids 1000 MG Oral Cap Take 3,000 mg by mouth daily.      Multiple Vitamin (MULTI-VITAMINS) Oral Tab Take  by mouth.           ALLERGIES:     Allergies   Allergen Reactions    Neosporin [Neomycin-Bacitracin-Polymyxin] RASH         FAMILY HISTORY:     Family History   Problem Relation Age of Onset    Hypertension Father     Heart Attack Father 44    Heart Disorder Father         AMI    Heart Disorder Mother     Diabetes Mother     Dementia Mother         She had it when she turned 89    Other (Alzheimer's Disease) Mother           SOCIAL HISTORY:     Social History     Socioeconomic History    Marital status:    Tobacco Use    Smoking status: Every Day     Packs/day: 0.50     Years: 30.00     Additional pack years: 0.00     Total pack years: 15.00     Types: Cigarettes     Passive exposure: Current    Smokeless tobacco: Former    Tobacco comments:     4 to 8 a day   Vaping Use    Vaping Use: Former    Substances: Nicotine    Devices: Disposable   Substance and Sexual Activity    Alcohol use: Yes     Alcohol/week: 3.0 standard drinks of alcohol     Types: 3 Standard drinks or equivalent per week     Comment: 3-4 drinks 2 times a week    Drug use: Yes     Types: Cannabis     Comment: occasional   Other Topics Concern    Caffeine Concern Yes     Comment: coffee, 2 cups/day    Reaction to local anesthetic No    Pt has a pacemaker No    Pt has a defibrillator No          REVIEW OF SYSTEMS:   A comprehensive 10 point review of systems was completed.  Pertinent positives and negatives noted in the the HPI.      LABS:     Lab Results   Component Value Date     04/12/2023    A1C 5.9 (H) 04/12/2023     Lab Results   Component Value Date    WBC 6.9 05/01/2023    RBC 4.58 05/01/2023    HGB 14.5 05/01/2023    HCT 44.1 05/01/2023     MCV 96.3 05/01/2023    MCH 31.7 05/01/2023    MCHC 32.9 05/01/2023    RDW 13.2 05/01/2023    .0 (L) 05/01/2023    MPV 8.4 08/18/2017     Lab Results   Component Value Date     (H) 05/01/2023    BUN 13 05/01/2023    BUNCREA 13.4 05/01/2023    CREATSERUM 0.97 05/01/2023    ANIONGAP 6 05/01/2023    GFRNAA 95 03/08/2022    GFRAA 110 03/08/2022    CA 9.2 05/01/2023    OSMOCALC 292 05/01/2023    ALKPHO 82 11/07/2023    AST 41 (H) 11/07/2023     (H) 11/07/2023    ALKPHOS 104 (H) 03/15/2016    BILT 0.6 11/07/2023    TP 7.2 11/07/2023    ALB 4.7 11/07/2023    GLOBULIN 3.6 04/10/2023    AGRATIO 1.5 03/15/2016     05/01/2023    K 3.7 05/01/2023     05/01/2023    CO2 27.0 05/01/2023     Lab Results   Component Value Date    PTP 12.5 04/10/2023    INR 0.93 04/10/2023     No results found for: \"VITD\", \"QVITD\", \"[id number]\"

## 2024-02-05 ENCOUNTER — APPOINTMENT (OUTPATIENT)
Dept: PHYSICAL THERAPY | Age: 54
End: 2024-02-05
Attending: PHYSICAL MEDICINE & REHABILITATION
Payer: MEDICAID

## 2024-02-12 ENCOUNTER — APPOINTMENT (OUTPATIENT)
Dept: PHYSICAL THERAPY | Age: 54
End: 2024-02-12
Attending: PHYSICAL MEDICINE & REHABILITATION
Payer: MEDICAID

## 2024-02-19 ENCOUNTER — APPOINTMENT (OUTPATIENT)
Dept: PHYSICAL THERAPY | Age: 54
End: 2024-02-19
Attending: PHYSICAL MEDICINE & REHABILITATION
Payer: MEDICAID

## 2024-02-26 ENCOUNTER — APPOINTMENT (OUTPATIENT)
Dept: PHYSICAL THERAPY | Age: 54
End: 2024-02-26
Attending: PHYSICAL MEDICINE & REHABILITATION
Payer: MEDICAID

## 2024-03-04 RX ORDER — FLUTICASONE PROPIONATE AND SALMETEROL 500; 50 UG/1; UG/1
1 POWDER RESPIRATORY (INHALATION) 2 TIMES DAILY
Qty: 180 EACH | Refills: 1 | Status: SHIPPED | OUTPATIENT
Start: 2024-03-04

## 2024-03-04 NOTE — TELEPHONE ENCOUNTER
LOV: 1/5/24  NOV: 7/9/24  Last refill: 10/11/23- 1 inhaler 3 refills    Refill pended, please review/sign if agreeable.

## 2024-03-12 ENCOUNTER — TELEPHONE (OUTPATIENT)
Facility: CLINIC | Age: 54
End: 2024-03-12

## 2024-03-12 NOTE — TELEPHONE ENCOUNTER
1st,overdue reminder letter mailed out to patient   Lab order   Orders Placed on 11/7/2023    Hepatic Function Panel (7)

## 2024-03-14 ENCOUNTER — LAB ENCOUNTER (OUTPATIENT)
Dept: LAB | Facility: HOSPITAL | Age: 54
End: 2024-03-14
Attending: INTERNAL MEDICINE
Payer: MEDICAID

## 2024-03-14 DIAGNOSIS — K75.81 NASH (NONALCOHOLIC STEATOHEPATITIS): ICD-10-CM

## 2024-03-14 DIAGNOSIS — K76.0 HEPATIC STEATOSIS: Primary | ICD-10-CM

## 2024-03-14 DIAGNOSIS — K74.00 HEPATIC FIBROSIS: ICD-10-CM

## 2024-03-14 LAB
ALBUMIN SERPL-MCNC: 4.8 G/DL (ref 3.2–4.8)
ALP LIVER SERPL-CCNC: 78 U/L
ALT SERPL-CCNC: 50 U/L
AST SERPL-CCNC: 32 U/L (ref ?–34)
BILIRUB DIRECT SERPL-MCNC: <0.1 MG/DL (ref ?–0.3)
BILIRUB SERPL-MCNC: 0.2 MG/DL (ref 0.3–1.2)
PROT SERPL-MCNC: 7.1 G/DL (ref 5.7–8.2)

## 2024-03-14 PROCEDURE — 36415 COLL VENOUS BLD VENIPUNCTURE: CPT

## 2024-03-14 PROCEDURE — 80076 HEPATIC FUNCTION PANEL: CPT

## 2024-03-20 ENCOUNTER — HOSPITAL ENCOUNTER (OUTPATIENT)
Dept: MRI IMAGING | Facility: HOSPITAL | Age: 54
Discharge: HOME | End: 2024-03-20
Attending: PHYSICAL MEDICINE & REHABILITATION
Payer: MEDICAID

## 2024-03-20 ENCOUNTER — TELEMEDICINE (OUTPATIENT)
Dept: PHYSICAL MEDICINE AND REHAB | Facility: CLINIC | Age: 54
End: 2024-03-20
Payer: MEDICAID

## 2024-03-20 ENCOUNTER — TELEPHONE (OUTPATIENT)
Dept: PHYSICAL MEDICINE AND REHAB | Facility: CLINIC | Age: 54
End: 2024-03-20

## 2024-03-20 DIAGNOSIS — M12.811 ROTATOR CUFF TEAR ARTHROPATHY OF RIGHT SHOULDER: ICD-10-CM

## 2024-03-20 DIAGNOSIS — M75.101 ROTATOR CUFF TEAR ARTHROPATHY OF RIGHT SHOULDER: ICD-10-CM

## 2024-03-20 DIAGNOSIS — S46.111A LABRAL TEAR OF LONG HEAD OF RIGHT BICEPS TENDON, INITIAL ENCOUNTER: Primary | ICD-10-CM

## 2024-03-20 DIAGNOSIS — M67.921 BICEPS TENDINOPATHY, RIGHT: ICD-10-CM

## 2024-03-20 PROCEDURE — 73221 MRI JOINT UPR EXTREM W/O DYE: CPT | Performed by: PHYSICAL MEDICINE & REHABILITATION

## 2024-03-20 NOTE — TELEPHONE ENCOUNTER
Initiated authorization for Ultrasound guided right biceps tendon injection with corticosteroid [id number] [id number] [facility]  Status: Approved-authorization is not required per health plan however may be subject to review once claim is submitted

## 2024-03-21 NOTE — TELEPHONE ENCOUNTER
Pt scheduled for Ultrasound guided right biceps tendon injection with corticosteroid on 3/27/2024.

## 2024-03-24 NOTE — PROGRESS NOTES
[facility]    Telemedicine Visit - Follow Up Evaluation    Telehealth Verbal Consent   I conducted a telehealth visit with Robert Yin today, 03/24/24, which was completed using two-way, real-time interactive audio and video communication. This has been done in good beverly to provide continuity of care in the best interest of the provider-patient relationship, due to the COVID - public health crisis/national emergency where restrictions of face-to-face office visits are ongoing. Every conscious effort was taken to allow for sufficient and adequate time to complete the visit.  The patient was made aware of the limitations of the telehealth visit, including treatment limitations as no physical exam could be performed.  The patient was advised to call 911 or to go to the ER in case there was an emergency.  The patient was also advised of the potential privacy & security concerns related to the telehealth platform.   The patient was made aware of where to find [facility]'s notice of privacy practices, telehealth consent form and other related consent forms and documents.  which are located on the [facility] website. The patient verbally agreed to telehealth consent form, related consents and the risks discussed.    Lastly, the patient confirmed that they were in [place].   Included in this visit, time may have been spent reviewing labs, medications, radiology tests and decision making. Appropriate medical decision-making and tests are ordered as detailed in the plan of care above.  Coding/billing information is submitted for this visit based on complexity of care and/or time spent for the visit.      HISTORY OF PRESENT ILLNESS:     Patient is following up for right shoulder pain.  He had MRI imaging of the shoulder which is reviewed.  He continues to have pain along the anterior posterolateral aspect worse with increased activity and movement.  He denies any change in  strength or bowel bladder      IMAGING:   MRI right shoulder completed on 3/20/2024 was personally reviewed which is notable for full-thickness tear of the long head biceps tendon which is retracted in the upper arm with partial-thickness articular surface tears of the supraspinatus, subscap and infraspinatus with subacromial subdeltoid bursitis      All imaging results were reviewed and discussed with patient.      ASSESSMENT:     1. Labral tear of long head of right biceps tendon, initial encounter    2. Rotator cuff tear arthropathy of right shoulder          PLAN:   Robert Yin is a 54 year old male following up for right shoulder pain secondary to rotator cuff tear as well as biceps labral tear.  Recommend ultrasound-guided biceps and subacromial bursa injection with corticosteroid.  Advised patient my office reach out to him once the injections approved.  If he does not have sufficient improvement with this plan he will need orthopedic evaluation for surgical intervention.      Follow-up:   For injection    We discussed that a telemedicine visit is in place of an office visit; however, this limits the ability to perform a thorough physical examination which may affect objective findings related to a specific condition and can affect treatment.    The patient verbalized understanding with this plan and was in agreement.  There are no barriers to learning.  All questions were answered.  Please note Dragon dictation software was used to dictate this note which may result in inadvertent typos.    Boston Zazueta D.O. FAAPMR & CAQSM  Physical Medicine and Rehabilitation/Sports Medicine    PAST MEDICAL HISTORY:     Past Medical History:   Diagnosis Date    Anxiety state     CAD (coronary artery disease) 2013    COPD (chronic obstructive pulmonary disease) (HCC)     Disorder of liver     enlarged liver    Diverticula of intestine     Essential hypertension     Heart disease 10/14/2022    High blood pressure      High cholesterol     History of blood transfusion     No reaction    Hx of motion sickness     Vertigo    Hyperlipidemia     Hypertension     Hypertriglyceridemia     Lipid screening 06/23/2014    MADELIN on CPAP     Prediabetes     Sleep apnea     CPAP    Tobacco dependence     Umbilical hernia 2008    Visual impairment     Glasses         PAST SURGICAL HISTORY:     Past Surgical History:   Procedure Laterality Date    ANESTH,SURGERY OF SHOULDER Left     BYPASS SURGERY  01/2014    CABG      COLONOSCOPY  09/01/2016        COLONOSCOPY      COLONOSCOPY N/A 04/11/2022    Procedure: COLONOSCOPY;  Surgeon: Raj Greenberg MD;  Location: [facility] ENDOSCOPY    ELECTROCARDIOGRAM, COMPLETE  01/18/2014    scanned to media tab    TONSILLECTOMY  1975    UMBILICAL HERNIA REPAIR           CURRENT MEDICATIONS:     Current Outpatient Medications   Medication Sig Dispense Refill    fluticasone-salmeterol (WIXELA INHUB) 500-50 MCG/ACT Inhalation Aerosol Powder, Breath Activated Inhale 1 puff into the lungs 2 (two) times daily. 180 each 1    Nicotine 10 MG Inhalation Inhalant Puff the inhaler for four 5-minute sessions, or continually for about 20 minutes (which is about how long one cartridge will last). You can use up to 6 cartridges per day. 168 each 0    tiotropium (SPIRIVA HANDIHALER) 18 MCG Inhalation Cap Inhale 1 capsule (18 mcg total) into the lungs daily. 90 capsule 1    ALPRAZolam 1 MG Oral Tab Take 1 tablet (1 mg total) by mouth nightly as needed for Sleep. 15 tablet 0    metoprolol tartrate 50 MG Oral Tab Take 1 tablet (50 mg total) by mouth 2 (two) times daily. (Patient taking differently: Take 0.5 tablets (25 mg total) by mouth 2 (two) times daily.) 180 tablet 1    escitalopram 10 MG Oral Tab Take 1 tablet (10 mg total) by mouth daily. 90 tablet 3    lisinopril 30 MG Oral Tab Take 1 tablet (30 mg total) by mouth daily. 90 tablet 3    albuterol 108 (90 Base) MCG/ACT Inhalation Aero Soln INHALE 2 PUFFS BY MOUTH EVERY  6 HOURS AS NEEDED FOR SHORTNESS OF BREATH OR WHEEZING 8.5 g 5    amLODIPine 5 MG Oral Tab TAKE 1 TABLET(5 MG) BY MOUTH DAILY 90 tablet 3    Turmeric (QC TUMERIC COMPLEX) 500 MG Oral Cap Take by mouth.      ascorbic acid 1000 MG Oral Tab Take 1 tablet (1,000 mg total) by mouth daily.      ipratropium-albuterol 0.5-2.5 (3) MG/3ML Inhalation Solution Take 3 mL by nebulization every 6 (six) hours as needed. 180 mL 2    REPATHA SURECLICK 140 MG/ML Subcutaneous Solution Auto-injector Inject twice monthly      aspirin 81 MG Oral Tab EC Take 1 tablet (81 mg total) by mouth daily. 90 tablet 1    omega-3 fatty acids 1000 MG Oral Cap Take 3,000 mg by mouth daily.      Multiple Vitamin (MULTI-VITAMINS) Oral Tab Take  by mouth.           ALLERGIES:     Allergies   Allergen Reactions    Neosporin [Neomycin-Bacitracin-Polymyxin] RASH         FAMILY HISTORY:     Family History   Problem Relation Age of Onset    Hypertension Father     Heart Attack Father 44    Heart Disorder Father         AMI    Heart Disorder Mother     Diabetes Mother     Dementia Mother         She had it when she turned 89    Other (Alzheimer's Disease) Mother           SOCIAL HISTORY:     Social History     Socioeconomic History    Marital status:    Tobacco Use    Smoking status: Every Day     Packs/day: 0.50     Years: 30.00     Additional pack years: 0.00     Total pack years: 15.00     Types: Cigarettes     Passive exposure: Current    Smokeless tobacco: Former    Tobacco comments:     4 to 8 a day   Vaping Use    Vaping Use: Former    Substances: Nicotine    Devices: Disposable   Substance and Sexual Activity    Alcohol use: Yes     Alcohol/week: 3.0 standard drinks of alcohol     Types: 3 Standard drinks or equivalent per week     Comment: 3-4 drinks 2 times a week    Drug use: Yes     Types: Cannabis     Comment: occasional   Other Topics Concern    Caffeine Concern Yes     Comment: coffee, 2 cups/day    Reaction to local anesthetic No    Pt has  a pacemaker No    Pt has a defibrillator No          REVIEW OF SYSTEMS:   As noted in HPI      PHYSICAL EXAM:   General: No immediate distress  Head: Normocephalic/ Atraumatic  Eyes: Extra-occular movements intact  Ears/Nose/Throat:  External appearance identifies normal appearance without obvious deformity  Cardiovascular: No cyanosis, clubbing or edema  Respiratory: Non-labored respirations  Skin: No lesions noted   Neurological: alert & oriented x 3, attentive, able to follow commands, comprehention intact, spontaneous speech intact  Psychiatric: Mood and affect appropriate  Musculoskeletal Exam:  No change since last exam        LABS:     Lab Results   Component Value Date     04/12/2023    A1C 5.9 (H) 04/12/2023     Lab Results   Component Value Date    WBC 6.9 05/01/2023    RBC 4.58 05/01/2023    HGB 14.5 05/01/2023    HCT 44.1 05/01/2023    MCV 96.3 05/01/2023    MCH 31.7 05/01/2023    MCHC 32.9 05/01/2023    RDW 13.2 05/01/2023    .0 (L) 05/01/2023    MPV 8.4 08/18/2017     Lab Results   Component Value Date     (H) 05/01/2023    BUN 13 05/01/2023    BUNCREA 13.4 05/01/2023    CREATSERUM 0.97 05/01/2023    ANIONGAP 6 05/01/2023    GFRNAA 95 03/08/2022    GFRAA 110 03/08/2022    CA 9.2 05/01/2023    OSMOCALC 292 05/01/2023    ALKPHO 78 03/14/2024    AST 32 03/14/2024    ALT 50 (H) 03/14/2024    ALKPHOS 104 (H) 03/15/2016    BILT 0.2 (L) 03/14/2024    TP 7.1 03/14/2024    ALB 4.8 03/14/2024    GLOBULIN 3.6 04/10/2023    AGRATIO 1.5 03/15/2016     05/01/2023    K 3.7 05/01/2023     05/01/2023    CO2 27.0 05/01/2023     Lab Results   Component Value Date    PTP 12.5 04/10/2023    INR 0.93 04/10/2023     No results found for: \"VITD\", \"QVITD\", \"[id number]\"

## 2024-03-27 ENCOUNTER — TELEPHONE (OUTPATIENT)
Dept: PHYSICAL THERAPY | Facility: HOSPITAL | Age: 54
End: 2024-03-27

## 2024-03-27 ENCOUNTER — ORDER TRANSCRIPTION (OUTPATIENT)
Dept: PHYSICAL THERAPY | Facility: HOSPITAL | Age: 54
End: 2024-03-27

## 2024-03-27 ENCOUNTER — OFFICE VISIT (OUTPATIENT)
Dept: PHYSICAL MEDICINE AND REHAB | Facility: CLINIC | Age: 54
End: 2024-03-27
Payer: MEDICAID

## 2024-03-27 VITALS
WEIGHT: 257 LBS | HEART RATE: 68 BPM | OXYGEN SATURATION: 96 % | BODY MASS INDEX: 40.34 KG/M2 | HEIGHT: 67 IN | RESPIRATION RATE: 18 BRPM

## 2024-03-27 DIAGNOSIS — S46.111A LABRAL TEAR OF LONG HEAD OF RIGHT BICEPS TENDON, INITIAL ENCOUNTER: Primary | ICD-10-CM

## 2024-03-27 DIAGNOSIS — M67.921 BICEPS TENDINOPATHY, RIGHT: ICD-10-CM

## 2024-03-27 DIAGNOSIS — M75.101 ROTATOR CUFF SYNDROME OF RIGHT SHOULDER: Primary | ICD-10-CM

## 2024-03-27 DIAGNOSIS — M12.811 ROTATOR CUFF ARTHROPATHY, RIGHT: ICD-10-CM

## 2024-03-27 DIAGNOSIS — M75.101 ROTATOR CUFF TEAR ARTHROPATHY OF RIGHT SHOULDER: ICD-10-CM

## 2024-03-27 DIAGNOSIS — M12.811 ROTATOR CUFF TEAR ARTHROPATHY OF RIGHT SHOULDER: ICD-10-CM

## 2024-03-27 PROCEDURE — 20611 DRAIN/INJ JOINT/BURSA W/US: CPT | Performed by: PHYSICAL MEDICINE & REHABILITATION

## 2024-03-27 RX ORDER — EZETIMIBE 10 MG/1
10 TABLET ORAL EVERY EVENING
COMMUNITY
Start: 2024-02-07

## 2024-03-27 RX ORDER — TRIAMCINOLONE ACETONIDE 40 MG/ML
80 INJECTION, SUSPENSION INTRA-ARTICULAR; INTRAMUSCULAR ONCE
Status: COMPLETED | OUTPATIENT
Start: 2024-03-27 | End: 2024-03-27

## 2024-03-27 RX ORDER — LIDOCAINE HYDROCHLORIDE 10 MG/ML
4 INJECTION, SOLUTION INFILTRATION; PERINEURAL ONCE
Status: COMPLETED | OUTPATIENT
Start: 2024-03-27 | End: 2024-03-27

## 2024-03-27 NOTE — PROCEDURES
Procedure:  Ultrasound guided RIGHT subacromial bursa aspiration and injection and bicipital groove injection with corticosteroid  The risks, benefits and anticipated outcomes of the procedure, the risks and benefits of the alternatives to the procedure, and the roles and tasks of the personnel to be involved, were discussed with the patient, and the patient consents to the procedure and agrees to proceed.  UNIVERSAL PROTOCOL / SAFETY CHECKLIST  Sign in Communication: Completed  Time Out:  Team Confirms the Correct Patient, Correct Procedure, Correct Site and Site Marking, Correct Position (if applicable), Prep and Dry Time (if applicable).    Affirmation of Time Out: YES  Sign Out Discussion: Completed if applicable  The procedure was carried out under sterile prep with sterile gel.  Target site was anesthetized with topical ethyl chloride spray.  Then, again with real time ultrasound guidance, a  25 gauge 1.5 in needle was advanced under real time US guidance using in plane approach with the transducer in transverse orientation until the needle tip was visualized to enter the subacromial bursa.  Following negative aspiration, a mixture of 3 cc of 1% lidocaine and 1 cc of Kenalog (40mg/ml) was injected.  Then attention was directed to the bicipital groove and using an in-plane injection approach from lateral to medial at the 25-gauge 1-1/2 inch needle was advanced until it was seen to enter the bicipital groove where a combination of 2 cc of 1% lidocaine and 1 cc of Kenalog 40 mg/mL was injected.  The needle was withdrawn without any complications.  Permanent images were taken and stored in the PACS system.    Ultrasound interpretation was performed prior to the procedure to identify the target and any adjacent neurovascular structures.  Subsequently, interpretation was performed during real-time needle guidance confirming placement.  Post-intervention interpretation was also performed confirming appropriate  injectate flow and hemostasis. The patient tolerated the procedure without complication and was instructed in post-procedure precautions.  Please see patient instructions.    Boston Zazueta DO, FAAPMR & CAQSM  Physical Medicine and Rehabilitation/Sports Medicine  [facility]

## 2024-04-03 ENCOUNTER — OFFICE VISIT (OUTPATIENT)
Dept: PHYSICAL MEDICINE AND REHAB | Facility: CLINIC | Age: 54
End: 2024-04-03
Payer: MEDICAID

## 2024-04-03 VITALS — RESPIRATION RATE: 18 BRPM | HEIGHT: 67 IN | WEIGHT: 264 LBS | BODY MASS INDEX: 41.44 KG/M2

## 2024-04-03 DIAGNOSIS — M67.921 BICEPS TENDINOPATHY, RIGHT: ICD-10-CM

## 2024-04-03 DIAGNOSIS — M75.101 ROTATOR CUFF TEAR ARTHROPATHY OF RIGHT SHOULDER: ICD-10-CM

## 2024-04-03 DIAGNOSIS — M12.811 ROTATOR CUFF TEAR ARTHROPATHY OF RIGHT SHOULDER: ICD-10-CM

## 2024-04-03 DIAGNOSIS — S46.111A LABRAL TEAR OF LONG HEAD OF RIGHT BICEPS TENDON, INITIAL ENCOUNTER: Primary | ICD-10-CM

## 2024-04-03 PROCEDURE — 99214 OFFICE O/P EST MOD 30 MIN: CPT | Performed by: PHYSICAL MEDICINE & REHABILITATION

## 2024-04-03 RX ORDER — ACETAMINOPHEN AND CODEINE PHOSPHATE 300; 30 MG/1; MG/1
1 TABLET ORAL EVERY 6 HOURS PRN
Qty: 30 TABLET | Refills: 0 | Status: SHIPPED | OUTPATIENT
Start: 2024-04-03

## 2024-04-03 NOTE — PROGRESS NOTES
[facility]  OFFICE FOLLOW UP EVALUATION      HISTORY OF PRESENT ILLNESS:     Chief Complaint   Patient presents with    Follow - Up     Returning patient following up after Ultrasound guided right biceps tendon and subacromial bursa injection with corticosteroid performed on 3/27/24. Notes relief for 2-3 days following injection but increasing over the past 3 days. LROM. Locates to entire shoulder. Denies N/T. His pain is intermittent that varies in intensity dependent on activities. CPL 6/10. Has not started PT. Using tylenol prn.        Patient is following up for right shoulder pain.  He had ultrasound-guided subacromial bursa and biceps tendon sheath injection.  He states the pain was resolved for 2 3 days but the pain is returned and is very severe.  Pain is located in the same area.  He has restricted range of motion and continues to have functional limitations.  Patient is wondering about his next steps for treatment.  He rates his pain to be 6 out of 10.  He is taking Tylenol as tolerated.  Denies any radiating symptoms.    PHYSICAL EXAM:   Resp 18   Ht 67\"   Wt 264 lb (119.7 kg)   BMI 41.35 kg/m²     SHOULDER: Pertinent positives highlighted in bold     Inspection     Skin No evidence of eythema, warmth, bruising, abrasions, deformity or drooping about bilateral upper extremities   Atrophy No evidence of muscular atrophy   Range of Motion Decreased on the involved side                           Palpation      Tenderness TTP of the greater tuberosity, bicipital groove, sternoclavicular joint, clavicle, acromioclavicular joint, coracoid and periscapular area   Crepitance Negative palpable/audible subacromial, glenohumeral, and scapulothoracic crepitance   Rotator Cuff     Strength 4/5 empty can; 5/5 ER; 5/5 IR   Impingement Positive Corbett  Positive Neers   ABD to 90 with resisted ER without pain (subacromial)  ABD to 90 with resisted IR without pain/weak  (intra-articular)       Biceps/Labral Positive Speed's  Negative Stockwell's   Negative Yergason       AC Joint Negative cross arm adduction      Stability Negative sulcus, AP translation, apprehension       IMAGING:     MRI right shoulder completed on 3/20/2024 was personally reviewed which is notable for full-thickness tear of the long head biceps tendon which is retracted in the upper arm with partial-thickness articular surface tears of the supraspinatus, subscap and infraspinatus with subacromial subdeltoid bursitis       All imaging results were reviewed and discussed with patient.      ASSESSMENT/PLAN:     1. Labral tear of long head of right biceps tendon, initial encounter    2. Rotator cuff tear arthropathy of right shoulder    3. Biceps tendinopathy, right        Robert Yin is a 54 year old male following up for right shoulder pain with rotator cuff tear and biceps tendon tear proximally.  He has not any significant improvement with physical therapy, oral medication ultrasound-guided injection.  I recommended seeing orthopedic surgery for consideration of surgical intervention.      The patient verbalized understanding with the plan and was in agreement. All questions/concerns were addressed and there were no barriers to learning.  Please note Dragon dictation software was used to dictate this note and may result in inadvertent typos.    Boston Zazueta DO, FAAPMR & CAQSM  Physical Medicine and Rehabilitation  Sports and Spine Medicine    PAST MEDICAL HISTORY:     Past Medical History:   Diagnosis Date    Anxiety state     CAD (coronary artery disease) 2013    COPD (chronic obstructive pulmonary disease) (HCC)     Disorder of liver     enlarged liver    Diverticula of intestine     Essential hypertension     Heart disease 10/14/2022    High blood pressure     High cholesterol     History of blood transfusion     No reaction    Hx of motion sickness     Vertigo    Hyperlipidemia     Hypertension      Hypertriglyceridemia     Lipid screening 06/23/2014    MADELIN on CPAP     Prediabetes     Sleep apnea     CPAP    Tobacco dependence     Umbilical hernia 2008    Visual impairment     Glasses         PAST SURGICAL HISTORY:     Past Surgical History:   Procedure Laterality Date    ANESTH,SURGERY OF SHOULDER Left     BYPASS SURGERY  01/2014    CABG      COLONOSCOPY  09/01/2016        COLONOSCOPY      COLONOSCOPY N/A 04/11/2022    Procedure: COLONOSCOPY;  Surgeon: Raj Greenberg MD;  Location: [facility] ENDOSCOPY    ELECTROCARDIOGRAM, COMPLETE  01/18/2014    scanned to media tab    TONSILLECTOMY  1975    UMBILICAL HERNIA REPAIR           CURRENT MEDICATIONS:     Current Outpatient Medications   Medication Sig Dispense Refill    acetaminophen-codeine (TYLENOL WITH CODEINE #3) 300-30 MG Oral Tab Take 1 tablet by mouth every 6 (six) hours as needed for Pain. 30 tablet 0    ezetimibe 10 MG Oral Tab Take 1 tablet (10 mg total) by mouth every evening.      fluticasone-salmeterol (WIXELA INHUB) 500-50 MCG/ACT Inhalation Aerosol Powder, Breath Activated Inhale 1 puff into the lungs 2 (two) times daily. 180 each 1    ALPRAZolam 1 MG Oral Tab Take 1 tablet (1 mg total) by mouth nightly as needed for Sleep. 15 tablet 0    metoprolol tartrate 50 MG Oral Tab Take 1 tablet (50 mg total) by mouth 2 (two) times daily. (Patient taking differently: Take 0.5 tablets (25 mg total) by mouth 2 (two) times daily.) 180 tablet 1    escitalopram 10 MG Oral Tab Take 1 tablet (10 mg total) by mouth daily. 90 tablet 3    lisinopril 30 MG Oral Tab Take 1 tablet (30 mg total) by mouth daily. 90 tablet 3    albuterol 108 (90 Base) MCG/ACT Inhalation Aero Soln INHALE 2 PUFFS BY MOUTH EVERY 6 HOURS AS NEEDED FOR SHORTNESS OF BREATH OR WHEEZING 8.5 g 5    amLODIPine 5 MG Oral Tab TAKE 1 TABLET(5 MG) BY MOUTH DAILY 90 tablet 3    Turmeric (QC TUMERIC COMPLEX) 500 MG Oral Cap Take by mouth.      ascorbic acid 1000 MG Oral Tab Take 1 tablet (1,000 mg  total) by mouth daily.      ipratropium-albuterol 0.5-2.5 (3) MG/3ML Inhalation Solution Take 3 mL by nebulization every 6 (six) hours as needed. 180 mL 2    REPATHA SURECLICK 140 MG/ML Subcutaneous Solution Auto-injector Inject twice monthly      aspirin 81 MG Oral Tab EC Take 1 tablet (81 mg total) by mouth daily. 90 tablet 1    omega-3 fatty acids 1000 MG Oral Cap Take 3,000 mg by mouth daily.      Multiple Vitamin (MULTI-VITAMINS) Oral Tab Take  by mouth.           ALLERGIES:     Allergies   Allergen Reactions    Neosporin [Neomycin-Bacitracin-Polymyxin] RASH         FAMILY HISTORY:     Family History   Problem Relation Age of Onset    Hypertension Father     Heart Attack Father 44    Heart Disorder Father         AMI    Heart Disorder Mother     Diabetes Mother     Dementia Mother         She had it when she turned 89    Other (Alzheimer's Disease) Mother           SOCIAL HISTORY:     Social History     Socioeconomic History    Marital status:    Tobacco Use    Smoking status: Every Day     Packs/day: 0.50     Years: 30.00     Additional pack years: 0.00     Total pack years: 15.00     Types: Cigarettes     Passive exposure: Current    Smokeless tobacco: Former    Tobacco comments:     4 to 8 a day   Vaping Use    Vaping Use: Former    Substances: Nicotine    Devices: Disposable   Substance and Sexual Activity    Alcohol use: Yes     Alcohol/week: 3.0 standard drinks of alcohol     Types: 3 Standard drinks or equivalent per week     Comment: 3-4 drinks 2 times a week    Drug use: Yes     Types: Cannabis     Comment: occasional   Other Topics Concern    Caffeine Concern Yes     Comment: coffee, 2 cups/day    Reaction to local anesthetic No    Pt has a pacemaker No    Pt has a defibrillator No          REVIEW OF SYSTEMS:   A comprehensive 10 point review of systems was completed.  Pertinent positives and negatives noted in the the HPI.      LABS:     Lab Results   Component Value Date     04/12/2023     A1C 5.9 (H) 04/12/2023     Lab Results   Component Value Date    WBC 6.9 05/01/2023    RBC 4.58 05/01/2023    HGB 14.5 05/01/2023    HCT 44.1 05/01/2023    MCV 96.3 05/01/2023    MCH 31.7 05/01/2023    MCHC 32.9 05/01/2023    RDW 13.2 05/01/2023    .0 (L) 05/01/2023    MPV 8.4 08/18/2017     Lab Results   Component Value Date     (H) 05/01/2023    BUN 13 05/01/2023    BUNCREA 13.4 05/01/2023    CREATSERUM 0.97 05/01/2023    ANIONGAP 6 05/01/2023    GFRNAA 95 03/08/2022    GFRAA 110 03/08/2022    CA 9.2 05/01/2023    OSMOCALC 292 05/01/2023    ALKPHO 78 03/14/2024    AST 32 03/14/2024    ALT 50 (H) 03/14/2024    ALKPHOS 104 (H) 03/15/2016    BILT 0.2 (L) 03/14/2024    TP 7.1 03/14/2024    ALB 4.8 03/14/2024    GLOBULIN 3.6 04/10/2023    AGRATIO 1.5 03/15/2016     05/01/2023    K 3.7 05/01/2023     05/01/2023    CO2 27.0 05/01/2023     Lab Results   Component Value Date    PTP 12.5 04/10/2023    INR 0.93 04/10/2023     No results found for: \"VITD\", \"QVITD\", \"[id number]\"

## 2024-04-05 ENCOUNTER — APPOINTMENT (OUTPATIENT)
Dept: PHYSICAL THERAPY | Age: 54
End: 2024-04-05
Attending: PHYSICAL MEDICINE & REHABILITATION
Payer: MEDICAID

## 2024-04-10 ENCOUNTER — LAB ENCOUNTER (OUTPATIENT)
Dept: LAB | Age: 54
End: 2024-04-10
Attending: INTERNAL MEDICINE
Payer: MEDICAID

## 2024-04-10 DIAGNOSIS — E78.1 PURE HYPERGLYCERIDEMIA: Primary | ICD-10-CM

## 2024-04-10 LAB
ALT SERPL-CCNC: 39 U/L
AST SERPL-CCNC: 27 U/L (ref ?–34)
CHOLEST SERPL-MCNC: 136 MG/DL (ref ?–200)
FASTING PATIENT LIPID ANSWER: YES
HDLC SERPL-MCNC: 66 MG/DL (ref 40–59)
LDLC SERPL CALC-MCNC: 48 MG/DL (ref ?–100)
NONHDLC SERPL-MCNC: 70 MG/DL (ref ?–130)
TRIGL SERPL-MCNC: 129 MG/DL (ref 30–149)
VLDLC SERPL CALC-MCNC: 18 MG/DL (ref 0–30)

## 2024-04-10 PROCEDURE — 84460 ALANINE AMINO (ALT) (SGPT): CPT

## 2024-04-10 PROCEDURE — 80061 LIPID PANEL: CPT

## 2024-04-10 PROCEDURE — 36415 COLL VENOUS BLD VENIPUNCTURE: CPT

## 2024-04-10 PROCEDURE — 84450 TRANSFERASE (AST) (SGOT): CPT

## 2024-04-12 ENCOUNTER — APPOINTMENT (OUTPATIENT)
Dept: PHYSICAL THERAPY | Age: 54
End: 2024-04-12
Attending: PHYSICAL MEDICINE & REHABILITATION
Payer: MEDICAID

## 2024-04-15 ENCOUNTER — APPOINTMENT (OUTPATIENT)
Dept: PHYSICAL THERAPY | Age: 54
End: 2024-04-15
Attending: PHYSICAL MEDICINE & REHABILITATION
Payer: MEDICAID

## 2024-04-15 ENCOUNTER — NURSE TRIAGE (OUTPATIENT)
Dept: FAMILY MEDICINE CLINIC | Facility: CLINIC | Age: 54
End: 2024-04-15

## 2024-04-15 DIAGNOSIS — R04.0 EPISTAXIS: Primary | ICD-10-CM

## 2024-04-15 NOTE — TELEPHONE ENCOUNTER
Action Requested: Summary for Provider     []  Critical Lab, Recommendations Needed  [x] Need Additional Advice  []   FYI    []   Need Orders  [] Need Medications Sent to Pharmacy  []  Other     SUMMARY: Dr. Flor: would you place referral to ENT?    Reason for call: Nose Bleed  Onset:   Patient has had a few sporadic episodes of nosebleeds. Noticed bleeding easily when moving a BM, or blowing nose.     Has seasonal allergies; also sleeps with cpap machine with humidification.    Not on blood thinners.   Patient Has cotton and ragweed allergies.    Not on allergy medicines.   Offered appt with PCP.  Preferred to see ENT   (Has [facility])  Reason for Disposition   Mild-moderate nosebleed and bleeding has stopped now    Protocols used: Nosebleed-A-[place]

## 2024-04-16 ENCOUNTER — TELEPHONE (OUTPATIENT)
Dept: FAMILY MEDICINE CLINIC | Facility: CLINIC | Age: 54
End: 2024-04-16

## 2024-04-16 DIAGNOSIS — F41.9 ANXIETY: ICD-10-CM

## 2024-04-16 RX ORDER — ALPRAZOLAM 1 MG/1
1 TABLET ORAL NIGHTLY PRN
Qty: 15 TABLET | Refills: 0 | Status: CANCELLED | OUTPATIENT
Start: 2024-04-16

## 2024-04-16 NOTE — TELEPHONE ENCOUNTER
Pt was informed by [facility] the alprazolam 1mg prescription needs pre- authorization.    Tasked to triage support  Please reply to pool: EM RN TRIAGE

## 2024-04-16 NOTE — TELEPHONE ENCOUNTER
Prior auth initiated for Alprazolam through cover my meds   Key: [id number]    No coverage was found  No coverage was found. Please reconfirm the patient's plan before submitting. You may also return to the dashboard and select a Medicare form if the patient has Medicare Part D coverage.    Requested PA through [employer]  Pending results

## 2024-04-16 NOTE — TELEPHONE ENCOUNTER
Relayed Dr message, pt verbalized understanding       Sangeeta Manriquez MD [address]  [facility] [address] [phone]

## 2024-04-16 NOTE — TELEPHONE ENCOUNTER
Waiting for Payer Response   4/16/2024  4:47 PM  Deadline to reply: April 21, 2024  4:35 PM Sending user: Nany Knox CMA   Note to payer: See attachment   Payer: [employer] [facility] Case ID: [id number]    [phone]    [phone]

## 2024-04-16 NOTE — TELEPHONE ENCOUNTER
ALPRAZolam 1 MG Oral Tab, Take 1 tablet (1 mg total) by mouth nightly as needed for Sleep., Disp: 15 tablet, Rfl: 0

## 2024-04-17 ENCOUNTER — OFFICE VISIT (OUTPATIENT)
Dept: OTOLARYNGOLOGY | Facility: CLINIC | Age: 54
End: 2024-04-17
Payer: MEDICAID

## 2024-04-17 DIAGNOSIS — J34.2 DEVIATED NASAL SEPTUM: ICD-10-CM

## 2024-04-17 DIAGNOSIS — R04.0 EPISTAXIS: Primary | ICD-10-CM

## 2024-04-17 PROCEDURE — 30901 CONTROL OF NOSEBLEED: CPT | Performed by: STUDENT IN AN ORGANIZED HEALTH CARE EDUCATION/TRAINING PROGRAM

## 2024-04-17 PROCEDURE — 99213 OFFICE O/P EST LOW 20 MIN: CPT | Performed by: STUDENT IN AN ORGANIZED HEALTH CARE EDUCATION/TRAINING PROGRAM

## 2024-04-17 NOTE — PROGRESS NOTES
[facility]  OTOLARYNGOLOGY - HEAD & NECK SURGERY    4/17/2024     Reason for Consultation:   Epistaxis, left    History of Present Illness:   Patient is a pleasant 54 year old male who is being seen for epistaxis from the left nasal cavity over the past few days.  He states initially he had a nosebleed from the left side which was brisk, but he was able to stop it with packing tissue in there and holding pressure.  He is not in any significant anticoagulation but does take 81 mg of aspirin daily due to his history of bypass surgery.  He did have 1 nosebleed from the right side which was accompanied by left-sided epistaxis.  He has not had any previous nasal surgery.  He is here for further evaluation.    Past Medical History  Past Medical History:    Anxiety state    CAD (coronary artery disease)    COPD (chronic obstructive pulmonary disease) (HCC)    Disorder of liver    enlarged liver    Diverticula of intestine    Essential hypertension    Heart disease    High blood pressure    High cholesterol    History of blood transfusion    No reaction    Hx of motion sickness    Vertigo    Hyperlipidemia    Hypertension    Hypertriglyceridemia    Lipid screening    MADELIN on CPAP    Prediabetes    Sleep apnea    CPAP    Tobacco dependence    Umbilical hernia    Visual impairment    Glasses       Past Surgical History  Past Surgical History:   Procedure Laterality Date    Anesth,surgery of shoulder Left     Bypass surgery  01/2014    Cabg      Colonoscopy  09/01/2016        Colonoscopy      Colonoscopy N/A 04/11/2022    Procedure: COLONOSCOPY;  Surgeon: Raj Greenberg MD;  Location: [facility] ENDOSCOPY    Electrocardiogram, complete  01/18/2014    scanned to media tab    Tonsillectomy  1975    Umbilical hernia repair         Family History  Family History   Problem Relation Age of Onset    Hypertension Father     Heart Attack Father 44    Heart Disorder Father         AMI    Heart Disorder Mother     Diabetes Mother      Dementia Mother         She had it when she turned 89    Other (Alzheimer's Disease) Mother        Social History  Pediatric History   Patient Parents    Not on file     Other Topics Concern     Service Not Asked    Blood Transfusions Not Asked    Caffeine Concern Yes     Comment: coffee, 2 cups/day    Occupational Exposure Not Asked    Hobby Hazards Not Asked    Sleep Concern Not Asked    Stress Concern Not Asked    Weight Concern Not Asked    Special Diet Not Asked    Back Care Not Asked    Exercise Not Asked    Bike Helmet Not Asked    Seat Belt Not Asked    Self-Exams Not Asked    Grew up on a farm Not Asked    History of tanning Not Asked    Outdoor occupation Not Asked    Reaction to local anesthetic No    Pt has a pacemaker No    Pt has a defibrillator No   Social History Narrative    Not on file           Current Medications:  Current Outpatient Medications   Medication Sig Dispense Refill    ALPRAZolam 1 MG Oral Tab Take 1 tablet (1 mg total) by mouth nightly as needed for Sleep. 15 tablet 0    acetaminophen-codeine (TYLENOL WITH CODEINE #3) 300-30 MG Oral Tab Take 1 tablet by mouth every 6 (six) hours as needed for Pain. 30 tablet 0    ezetimibe 10 MG Oral Tab Take 1 tablet (10 mg total) by mouth every evening.      fluticasone-salmeterol (WIXELA INHUB) 500-50 MCG/ACT Inhalation Aerosol Powder, Breath Activated Inhale 1 puff into the lungs 2 (two) times daily. 180 each 1    escitalopram 10 MG Oral Tab Take 1 tablet (10 mg total) by mouth daily. 90 tablet 3    lisinopril 30 MG Oral Tab Take 1 tablet (30 mg total) by mouth daily. 90 tablet 3    albuterol 108 (90 Base) MCG/ACT Inhalation Aero Soln INHALE 2 PUFFS BY MOUTH EVERY 6 HOURS AS NEEDED FOR SHORTNESS OF BREATH OR WHEEZING 8.5 g 5    amLODIPine 5 MG Oral Tab TAKE 1 TABLET(5 MG) BY MOUTH DAILY 90 tablet 3    Turmeric (QC TUMERIC COMPLEX) 500 MG Oral Cap Take by mouth.      ascorbic acid 1000 MG Oral Tab Take 1 tablet (1,000 mg total) by mouth  daily.      ipratropium-albuterol 0.5-2.5 (3) MG/3ML Inhalation Solution Take 3 mL by nebulization every 6 (six) hours as needed. 180 mL 2    REPATHA SURECLICK 140 MG/ML Subcutaneous Solution Auto-injector Inject twice monthly      aspirin 81 MG Oral Tab EC Take 1 tablet (81 mg total) by mouth daily. 90 tablet 1    omega-3 fatty acids 1000 MG Oral Cap Take 3,000 mg by mouth daily.      Multiple Vitamin (MULTI-VITAMINS) Oral Tab Take  by mouth.      metoprolol tartrate 50 MG Oral Tab Take 1 tablet (50 mg total) by mouth 2 (two) times daily. (Patient taking differently: Take 0.5 tablets (25 mg total) by mouth 2 (two) times daily.) 180 tablet 1       Allergies  Allergies   Allergen Reactions    Neosporin [Neomycin-Bacitracin-Polymyxin] RASH       Review of Systems:   A comprehensive 10 point review of systems was completed.  Pertinent positives and negatives noted in the the HPI.    Physical Exam:   There were no vitals taken for this visit.    GENERAL: No acute distress, Comfortable appearing  FACE: HB 1/6, Normal Animation  HEAD: Normocephalic  EYES: EOMI, pupils equil  EARS: Bilateral Auricles Symmetric, bilateral tympanic membranes normal  NOSE: Nares patent bilaterally  ORAL CAVITY: Tongue mobile, Oropharynx clear, Floor of mouth clear, Posterior oropharynx normal  NECK: No palpable lymphadenopathy, thyroid not palpable, nontender    PROCEDURE: BILATERAL RIGID NASAL ENDOSCOPY  Bilateral rigid nasal endoscopy (66755) was performed. Verbal consent was obtained from the patient to proceed with rigid nasal endoscopy. The nasal cavity was decongested and topically anesthetized with a combination of Oxymetazoline and 4% Lidocaine. A rigid 4mm 30 degree nasal endoscope connected to a high-definition endoscopy system was used to examine both nasal cavities. Digital photos and/or videos of relevant exam findings were obtained. The inferior meatus, inferior turbinate, nasopharynx, middle meatus, middle turbinate, superior  meatus, superior turbinate, and sphenoethmoidal recess were examined bilaterally and deemed to be normal, with any exceptions as noted below. At the completion of the procedure the endoscope was removed. The patient tolerated the procedure well. There were no complications.    Findings: The bilateral inferior turbinates were normal. The Septum was deviated to the right, with prominent vessels of the left caudal septum. The middle meatus was patent bilaterally. There were no obvious masses or polyps noted.    RIGID NASAL ENDOSCOPY WITH CAUTERIZATION (33672)    Procedure: Patient was topically anesthetized using a combination of 4% Licocaine and Neosynephrine. Using a 30 degree rigid ednoscope, the vessels on the left side were visualized. Using silver nitrate sticks the vessels were cauterized in the usual fashion.  The patient tolerated the procedure well.  Given routine post cauterization instructions.      Results:     Laboratory Data:  Lab Results   Component Value Date    WBC 6.9 05/01/2023    HGB 14.5 05/01/2023    HCT 44.1 05/01/2023    .0 (L) 05/01/2023    CREATSERUM 0.97 05/01/2023    BUN 13 05/01/2023     05/01/2023    K 3.7 05/01/2023     05/01/2023    CO2 27.0 05/01/2023     (H) 05/01/2023    CA 9.2 05/01/2023    ALB 4.8 03/14/2024    ALKPHO 78 03/14/2024    TP 7.1 03/14/2024    AST 27 04/10/2024    ALT 39 04/10/2024    INR 0.93 04/10/2023    PTP 12.5 04/10/2023    T4F 0.9 11/30/2022    TSH 2.290 11/30/2022     (H) 03/08/2022    CRP 0.89 (H) 03/08/2022    B12 1,007 (H) 03/08/2022         Imaging:  MRI SHOULDER, RIGHT (CPT=73221)    Result Date: 3/20/2024  PROCEDURE: MRI SHOULDER, RIGHT (CPT=73221)  COMPARISON: [facility], XR SHOULDER, COMPLETE (MIN 2 VIEWS), RIGHT (CPT=73030), 1/04/2024, 12:25 PM.  INDICATIONS: M67.921 Biceps tendinopathy, right M12.811 Rotator cuff tear arthropathy of right shoulder M75.101 Rotator cuff tear arthropathy of right  shoulder  TECHNIQUE: Multiplanar, multisequence imaging of the shoulder was performed. Images were performed without contrast.   FINDINGS:  ROTATOR CUFF:  SUPRASPINATUS:  There is increased signal within the tendon with a partial-thickness articular surface tear. INFRASPINATUS:  There is increased signal within the tendon with a partial-thickness articular surface tear. TERES MINOR:  Intact SUBSCAPULARIS:  There is increased signal within the tendon with a partial-thickness articular surface tear.  MUSCLES:  The muscles have a normal signal intensity and bulk.  LONG HEAD BICEPS TENDON:  There is a full-thickness tear of the long head biceps tendon with the tendon retracted into the upper arm and in the intertubercular groove.  An intra-articular portion not visualized.  LABRUM:  Abnormal signal and morphology of the anterior and superior labrum compatible with degenerative changes.  ARTICULAR CARTILAGE:  No large full-thickness cartilage defects.  A.C. JOINT:  There are osteophytes with subchondral cystic change compatible with degenerative changes.  Trace joint effusion.  BONE MARROW:  Irregularity and cystic change at the greater tuberosity from chronic rotator cuff tendinopathy. No acute fracture, dislocation, or marrow replacing lesion.  JOINT FLUID:  No joint effusion  SUBACROMIAL AND SUBDELTOID BURSA:  There is thickening with increased fluid in the subacromial subdeltoid bursa.         CONCLUSION:   Partial-thickness articular surface tears of the supraspinatus, subscapularis and infraspinatus with subacromial subdeltoid bursitis.  Full-thickness tear of the long head biceps tendon which is retracted into the upper arm.    Dictated by (CST): Grant Dailey MD on 3/20/2024 at 11:03 AM     Finalized by (CST): Grant Dailey MD on 3/20/2024 at 11:10 AM              Impression:   Epistaxis, left   deviated nasal septum    Recommendations:  I have performed silver nitrate cautery of the left caudal septum  He will  apply Vaseline to the area twice daily  Limited nose blowing  He will let me know if he continues to have nosebleeds we may need to perform nasal endoscopy with cauterization under local in the operating room.    Thank you for allowing me to participate in the care of your patient.    Raj Moreno,    Otolaryngology/Rhinology, Sinus, and Endoscopic Skull Base Surgery  [address] [place] [address]  Phone [phone]  Fax [phone]  4/17/2024  1:39 PM  4/17/2024

## 2024-04-22 ENCOUNTER — APPOINTMENT (OUTPATIENT)
Dept: PHYSICAL THERAPY | Age: 54
End: 2024-04-22
Attending: PHYSICAL MEDICINE & REHABILITATION
Payer: MEDICAID

## 2024-04-23 ENCOUNTER — MED REC SCAN ONLY (OUTPATIENT)
Dept: FAMILY MEDICINE CLINIC | Facility: CLINIC | Age: 54
End: 2024-04-23

## 2024-04-24 ENCOUNTER — TELEPHONE (OUTPATIENT)
Dept: PULMONOLOGY | Facility: CLINIC | Age: 54
End: 2024-04-24

## 2024-04-24 ENCOUNTER — TELEPHONE (OUTPATIENT)
Dept: ADMINISTRATIVE | Age: 54
End: 2024-04-24

## 2024-04-24 DIAGNOSIS — Z87.891 HISTORY OF TOBACCO USE, PRESENTING HAZARDS TO HEALTH: Primary | ICD-10-CM

## 2024-04-24 NOTE — TELEPHONE ENCOUNTER
Patient calling states was told by central scheduling that CT scheduled for tomorrow was denied. Asking for RN to call back to confirm so patient can keep or cancel appointment. Please call.

## 2024-04-24 NOTE — TELEPHONE ENCOUNTER
Spoke with patient, informed him insurance denied LDCT for tomorrow, expected date was 12/2024, test scheduled too early.  Patient verbalized understanding, CT appointment canceled.    Jt BEJARANO - Please review/sign pended re-order of LDCT.

## 2024-04-24 NOTE — TELEPHONE ENCOUNTER
Hello,    Prior authorization for the below test has been denied by payer/[employer].      Please note:    The clinical rationale provided by payer is based on past medical history, current signs and symptoms including the examination performed, previous imaging reports (including X-Ray, US, CT, etc.), previous care given - including conservative therapies (e.g. medication, therapies), any previous intervention reports (e.g. surgery, injections, etc.), and any lab / pathology results related to this Imaging request.      Denial rationale:     For 62851 CT Thorax LDCT scrn, w/o contr Your healthcare provider told us that you   have a new nodule (growth) on your lung. The request cannot be approved because:  It is only supported twelve months after your lung nodule (growth) was found to be a   Lung RADs score two (very low possibility of being harmful).   This finding was based on review of [facility] Chest Imaging Guidelines Section(s):   Incidental Pulmonary Nodules Detected on Low Dose CT Chest (LDCT) Images (CH   33.3).    -----------------------------------------------------------------------------------------------------------------------------------------------------------------------------------------------------------------------------------------------    Case remains [id number] eligible until end of day __5.1___  .  Be advised: An appeal will need to be done by your office should you not do the peer to peer.    Case remains first level appeal eligible for the next 60 calendar days, appeal information may be found within denial letter.  [id number] offer letter and denial letter PDF's may be found within the Media tab. Please un-check \"Clinical Info Only\" to view.      ~Please note: A new case may not be submitted to payer for the same or similar CPT code until _6.28__  or later.    ~Please route any developments of this case to me as I may need to update the health plan, patient or facility  departments.        Please advise.  Thank you!      Randi MCBRIDE  Referral Specialist  Centralized Prior Authorizations/Referrals  [place]

## 2024-04-26 ENCOUNTER — TELEPHONE (OUTPATIENT)
Facility: CLINIC | Age: 54
End: 2024-04-26

## 2024-04-26 ENCOUNTER — LAB ENCOUNTER (OUTPATIENT)
Dept: LAB | Age: 54
End: 2024-04-26
Attending: INTERNAL MEDICINE
Payer: MEDICAID

## 2024-04-26 ENCOUNTER — HOSPITAL ENCOUNTER (OUTPATIENT)
Dept: ULTRASOUND IMAGING | Age: 54
Discharge: HOME | End: 2024-04-26
Attending: INTERNAL MEDICINE
Payer: MEDICAID

## 2024-04-26 DIAGNOSIS — K76.0 HEPATIC STEATOSIS: ICD-10-CM

## 2024-04-26 DIAGNOSIS — K74.00 HEPATIC FIBROSIS: ICD-10-CM

## 2024-04-26 LAB
AFP-TM SERPL-MCNC: <2.2 NG/ML
ALBUMIN SERPL-MCNC: 5 G/DL (ref 3.2–4.8)
ALBUMIN/GLOB SERPL: 1.9 {RATIO} (ref 1–2)
ALP LIVER SERPL-CCNC: 84 U/L
ALT SERPL-CCNC: 55 U/L
ANION GAP SERPL CALC-SCNC: 6 MMOL/L (ref 0–18)
AST SERPL-CCNC: 32 U/L (ref ?–34)
BASOPHILS # BLD AUTO: 0.1 X10(3) UL (ref 0–0.2)
BASOPHILS NFR BLD AUTO: 0.9 %
BILIRUB SERPL-MCNC: 0.7 MG/DL (ref 0.3–1.2)
BUN BLD-MCNC: 17 MG/DL (ref 9–23)
BUN/CREAT SERPL: 16.8 (ref 10–20)
CALCIUM BLD-MCNC: 10.7 MG/DL (ref 8.7–10.4)
CHLORIDE SERPL-SCNC: 104 MMOL/L (ref 98–112)
CO2 SERPL-SCNC: 29 MMOL/L (ref 21–32)
CREAT BLD-MCNC: 1.01 MG/DL
DEPRECATED RDW RBC AUTO: 43.6 FL (ref 35.1–46.3)
EGFRCR SERPLBLD CKD-EPI 2021: 87 ML/MIN/1.73M2 (ref 60–?)
EOSINOPHIL # BLD AUTO: 0.23 X10(3) UL (ref 0–0.7)
EOSINOPHIL NFR BLD AUTO: 2 %
ERYTHROCYTE [DISTWIDTH] IN BLOOD BY AUTOMATED COUNT: 13.1 % (ref 11–15)
FASTING STATUS PATIENT QL REPORTED: NO
GLOBULIN PLAS-MCNC: 2.6 G/DL (ref 2.8–4.4)
GLUCOSE BLD-MCNC: 99 MG/DL (ref 70–99)
HCT VFR BLD AUTO: 48.8 %
HGB BLD-MCNC: 16.8 G/DL
IMM GRANULOCYTES # BLD AUTO: 0.03 X10(3) UL (ref 0–1)
IMM GRANULOCYTES NFR BLD: 0.3 %
INR BLD: 0.9 (ref 0.8–1.2)
LYMPHOCYTES # BLD AUTO: 2.9 X10(3) UL (ref 1–4)
LYMPHOCYTES NFR BLD AUTO: 24.9 %
MCH RBC QN AUTO: 31.3 PG (ref 26–34)
MCHC RBC AUTO-ENTMCNC: 34.4 G/DL (ref 31–37)
MCV RBC AUTO: 90.9 FL
MONOCYTES # BLD AUTO: 0.9 X10(3) UL (ref 0.1–1)
MONOCYTES NFR BLD AUTO: 7.7 %
NEUTROPHILS # BLD AUTO: 7.51 X10 (3) UL (ref 1.5–7.7)
NEUTROPHILS # BLD AUTO: 7.51 X10(3) UL (ref 1.5–7.7)
NEUTROPHILS NFR BLD AUTO: 64.2 %
OSMOLALITY SERPL CALC.SUM OF ELEC: 290 MOSM/KG (ref 275–295)
PLATELET # BLD AUTO: 202 10(3)UL (ref 150–450)
POTASSIUM SERPL-SCNC: 4.3 MMOL/L (ref 3.5–5.1)
PROT SERPL-MCNC: 7.6 G/DL (ref 5.7–8.2)
PROTHROMBIN TIME: 12.7 SECONDS (ref 11.6–14.8)
RBC # BLD AUTO: 5.37 X10(6)UL
SODIUM SERPL-SCNC: 139 MMOL/L (ref 136–145)
WBC # BLD AUTO: 11.7 X10(3) UL (ref 4–11)

## 2024-04-26 PROCEDURE — 36415 COLL VENOUS BLD VENIPUNCTURE: CPT

## 2024-04-26 PROCEDURE — 82105 ALPHA-FETOPROTEIN SERUM: CPT

## 2024-04-26 PROCEDURE — 80053 COMPREHEN METABOLIC PANEL: CPT

## 2024-04-26 PROCEDURE — 85610 PROTHROMBIN TIME: CPT

## 2024-04-26 PROCEDURE — 76705 ECHO EXAM OF ABDOMEN: CPT | Performed by: INTERNAL MEDICINE

## 2024-04-26 PROCEDURE — 85025 COMPLETE CBC W/AUTO DIFF WBC: CPT

## 2024-04-26 NOTE — TELEPHONE ENCOUNTER
Called and spoke to the patient, /name verified.    Informed the patient that the blood work completed on 4/10 was ordered by Dr Saldaña and was different from GI lab orders.

## 2024-04-26 NOTE — TELEPHONE ENCOUNTER
Patient calling states was told labs were needed again, states just recently did labs 2 weeks ago. Please call and advise in to needing labs again or not and if so fasting required.

## 2024-04-29 ENCOUNTER — TELEPHONE (OUTPATIENT)
Facility: CLINIC | Age: 54
End: 2024-04-29

## 2024-04-29 DIAGNOSIS — E83.52 HYPERCALCEMIA: ICD-10-CM

## 2024-04-29 DIAGNOSIS — D72.829 LEUKOCYTOSIS, UNSPECIFIED TYPE: Primary | ICD-10-CM

## 2024-04-29 NOTE — TELEPHONE ENCOUNTER
Health Maintenance Updated.    6 month liver ultrasound recall entered into patient outreach in [facility].  Next ultrasound will be due around 10/26/2024.    Patient viewed result note from Dr. Whitehead in [facility]:  Seen by patient Robert King Humphrey on 4/26/2024 10:29 AM

## 2024-04-29 NOTE — TELEPHONE ENCOUNTER
----- Message from Fernando Whitehead MD sent at 4/26/2024 10:11 AM CDT -----  Please see the following [employer] message sent to the patient:    \"Hi Al,    I hope that you are feeling well.    Your liver ultrasound reveals a fatty liver as expected.  There were no signs of liver tumors.    Your liver enzymes performed by Dr. Saldaña on April 10, 2024 were normal, however, I would recommend the additional blood tests that I have ordered to be drawn soon as well.    A liver ultrasound should be repeated in 6 months.    It has also been quite sometime since I have seen you in the office and I would recommend a follow-up office visit (or at least a video or telephone visit) at your convenience.    If I can answer any questions regarding the above, please do not hesitate to contact me.    Sincerely,    Dr. Whitehead\"    GI RNs: Please enter liver ultrasound recall for 6 months.

## 2024-05-03 ENCOUNTER — APPOINTMENT (OUTPATIENT)
Dept: PHYSICAL THERAPY | Age: 54
End: 2024-05-03
Attending: PHYSICAL MEDICINE & REHABILITATION
Payer: MEDICAID

## 2024-05-06 ENCOUNTER — TELEPHONE (OUTPATIENT)
Facility: CLINIC | Age: 54
End: 2024-05-06

## 2024-05-06 NOTE — TELEPHONE ENCOUNTER
Called and spoke to the patient, date of birth and name verified.    The patient was notified to repeat blood work at the end of this month and repeat ultrasound of Liver in 6 months.    He agreed and verbalized understanding.

## 2024-05-06 NOTE — TELEPHONE ENCOUNTER
Patient states that he received a letter stating that he has over due orders.  He states that he had an ultrasound on 4/26/24, so he needs to know when he needs to repeat.  Please call

## 2024-05-08 NOTE — TELEPHONE ENCOUNTER
Please review. Protocol Failed or has no Protocol    Requested Prescriptions   Pending Prescriptions Disp Refills    AMLODIPINE 5 MG Oral Tab [Pharmacy Med Name: AMLODIPINE BESYLATE 5MG TABLETS] 90 tablet 3     Sig: TAKE 1 TABLET(5 MG) BY MOUTH DAILY       Hypertension Medications Protocol Failed - 5/8/2024  2:57 PM        Failed - Last BP reading less than 140/90     BP Readings from Last 1 Encounters:   01/05/24 159/79               Passed - CMP or BMP in past 12 months        Passed - In person appointment or virtual visit in the past 12 mos or appointment in next 3 mos     Recent Outpatient Visits              3 weeks ago Epistaxis    [facility], [facility], Raj Lopez, DO    Office Visit    1 month ago Labral tear of long head of right biceps tendon, initial encounter    [facility], [place], DO    Office Visit    1 month ago Labral tear of long head of right biceps tendon, initial encounter    [facility], [place], DO    Office Visit    1 month ago Labral tear of long head of right biceps tendon, initial encounter    [facility], [place], DO    Telemedicine    3 months ago Rotator cuff tear arthropathy of right shoulder    [facility]    [facility]          Future Appointments         Provider Department Appt Notes    In 2 months Jt Oshea PA-C [facility], [facility] 6 mth f/up                    Passed - EGFRCR or GFRNAA > 50     GFR Evaluation  EGFRCR: 87 , resulted on 4/26/2024

## 2024-05-09 RX ORDER — AMLODIPINE BESYLATE 5 MG/1
5 TABLET ORAL DAILY
Qty: 90 TABLET | Refills: 3 | Status: SHIPPED | OUTPATIENT
Start: 2024-05-09

## 2024-05-10 ENCOUNTER — APPOINTMENT (OUTPATIENT)
Dept: PHYSICAL THERAPY | Age: 54
End: 2024-05-10
Attending: PHYSICAL MEDICINE & REHABILITATION
Payer: MEDICAID

## 2024-05-17 ENCOUNTER — APPOINTMENT (OUTPATIENT)
Dept: PHYSICAL THERAPY | Age: 54
End: 2024-05-17
Attending: PHYSICAL MEDICINE & REHABILITATION
Payer: MEDICAID

## 2024-05-17 DIAGNOSIS — F41.9 ANXIETY: ICD-10-CM

## 2024-05-21 RX ORDER — ALPRAZOLAM 1 MG/1
1 TABLET ORAL NIGHTLY PRN
Qty: 60 TABLET | Refills: 0 | Status: SHIPPED | OUTPATIENT
Start: 2024-05-21

## 2024-05-21 NOTE — TELEPHONE ENCOUNTER
Please review. Protocol Failed; No Protocol    Recent fills: 4/17/2024 quantity 14 , 5/16/2024 quantity 1  Last Rx written: 4/15/2024  Last office visit: 12/8/2023      Requested Prescriptions   Pending Prescriptions Disp Refills    ALPRAZOLAM 1 MG Oral Tab [Pharmacy Med Name: ALPRAZOLAM 1MG TABLETS] 15 tablet 0     Sig: TAKE 1 TABLET(1 MG) BY MOUTH EVERY NIGHT AS NEEDED FOR SLEEP       Controlled Substance Medication Failed - 5/17/2024  1:05 PM        Failed - This medication is a controlled substance - forward to provider to refill               Future Appointments         Provider Department Appt Notes    In 1 month Jt Oshea PA-C [facility], [facility] 6 mth f/up          Recent Outpatient Visits              1 month ago Epistaxis    [facility], [facility], Raj Lopez, DO    Office Visit    1 month ago Labral tear of long head of right biceps tendon, initial encounter    [facility], [place],     Office Visit    1 month ago Labral tear of long head of right biceps tendon, initial encounter    [facility], [place],     Office Visit    2 months ago Labral tear of long head of right biceps tendon, initial encounter    [facility] [place], [place]    Telemedicine    3 months ago Rotator cuff tear arthropathy of right shoulder    [facility] [place], [place]    Office Visit

## 2024-05-24 ENCOUNTER — APPOINTMENT (OUTPATIENT)
Dept: PHYSICAL THERAPY | Age: 54
End: 2024-05-24
Attending: PHYSICAL MEDICINE & REHABILITATION
Payer: MEDICAID

## 2024-06-10 RX ORDER — ALBUTEROL SULFATE 90 UG/1
AEROSOL, METERED RESPIRATORY (INHALATION)
Qty: 8.5 G | Refills: 5 | Status: SHIPPED | OUTPATIENT
Start: 2024-06-10

## 2024-06-10 NOTE — TELEPHONE ENCOUNTER
Jt- please sign pended order for Albuterol 108, if agreeable    Last office visit: 1/5/24 Follow up: 7/11/24 Last refill: 8/10/23

## 2024-06-12 RX ORDER — IPRATROPIUM BROMIDE AND ALBUTEROL SULFATE 2.5; .5 MG/3ML; MG/3ML
3 SOLUTION RESPIRATORY (INHALATION) EVERY 6 HOURS PRN
Qty: 180 ML | Refills: 1 | Status: SHIPPED | OUTPATIENT
Start: 2024-06-12

## 2024-06-12 RX ORDER — FLUTICASONE PROPIONATE AND SALMETEROL 500; 50 UG/1; UG/1
1 POWDER RESPIRATORY (INHALATION) 2 TIMES DAILY
Qty: 180 EACH | Refills: 1 | Status: SHIPPED | OUTPATIENT
Start: 2024-06-12

## 2024-06-12 RX ORDER — ALBUTEROL SULFATE 90 UG/1
AEROSOL, METERED RESPIRATORY (INHALATION)
Qty: 8.5 G | Refills: 5 | Status: SHIPPED | OUTPATIENT
Start: 2024-06-12

## 2024-06-12 NOTE — TELEPHONE ENCOUNTER
Patient called for a medication refill for his inhalers. Please send the refills to the [facility] pharmacy in [place].         ALBUTEROL 108 (90 Base) MCG/ACT Inhalation Aero Soln, INHALE 2 PUFFS BY MOUTH EVERY 6 HOURS AS NEEDED FOR SHORTNESS OF BREATH OR WHEEZING, Disp: 8.5 g, Rfl: 5      fluticasone-salmeterol (WIXELA INHUB) 500-50 MCG/ACT Inhalation Aerosol Powder, Breath Activated, Inhale 1 puff into the lungs 2 (two) times daily., Disp: 180 each, Rfl: 1        ipratropium-albuterol 0.5-2.5 (3) MG/3ML Inhalation Solution, Take 3 mL by nebulization every 6 (six) hours as needed., Disp: 180 mL, Rfl: 2

## 2024-06-12 NOTE — TELEPHONE ENCOUNTER
LOV: 1/5/24  LR: albuterol - 6/10/24, Wixela - 3/4/24, duoneb - 12/20/22    Jt BEJARANO - Patient requesting refills sent to alternate pharmacy. Please review/sign if agreeable.

## 2024-06-25 DIAGNOSIS — F41.9 ANXIETY: ICD-10-CM

## 2024-06-25 RX ORDER — AMLODIPINE BESYLATE 5 MG/1
5 TABLET ORAL DAILY
Qty: 90 TABLET | Refills: 3 | Status: SHIPPED | OUTPATIENT
Start: 2024-06-25

## 2024-06-25 RX ORDER — LISINOPRIL 30 MG/1
30 TABLET ORAL DAILY
Qty: 90 TABLET | Refills: 0 | Status: SHIPPED | OUTPATIENT
Start: 2024-06-25

## 2024-06-25 RX ORDER — ASPIRIN 81 MG/1
81 TABLET ORAL DAILY
Qty: 90 TABLET | Refills: 1 | Status: SHIPPED | OUTPATIENT
Start: 2024-06-25

## 2024-06-25 RX ORDER — ESCITALOPRAM OXALATE 10 MG/1
10 TABLET ORAL DAILY
Qty: 90 TABLET | Refills: 0 | Status: SHIPPED | OUTPATIENT
Start: 2024-06-25

## 2024-06-25 RX ORDER — ALPRAZOLAM 1 MG/1
1 TABLET ORAL NIGHTLY PRN
Qty: 60 TABLET | Refills: 0 | Status: SHIPPED | OUTPATIENT
Start: 2024-06-25

## 2024-06-25 NOTE — TELEPHONE ENCOUNTER
Please review; protocol failed/No Protocol    [facility]- p[facility] for [facility].    Recent Fills: 04/17/2024-quantity 14, 05/16/2024-quantity 1    Last Rx Written: 04/15/2024    Last Office Visit: 12/08/2023    Aspirin low dose: last prescribed by cardiology-patient wanting PCP to prescribe-last written by PCP 2 years ago.     Metoprolol: patient stated taking differently. Pended for how patient is taking, its metoprolol 25mg 1 tablet twice a day. Instead of Metoprolol 50mg twice daily.    Requested Prescriptions   Pending Prescriptions Disp Refills    ALPRAZolam 1 MG Oral Tab 60 tablet 0     Sig: Take 1 tablet (1 mg total) by mouth nightly as needed for Sleep.       Controlled Substance Medication Failed - 6/25/2024  8:37 AM        Failed - This medication is a controlled substance - forward to provider to refill          aspirin 81 MG Oral Tab EC 90 tablet 1     Sig: Take 1 tablet (81 mg total) by mouth daily.       Aspirin Protocol Failed - 6/25/2024  8:37 AM        Failed - In person appointment or virtual visit in the past 6 mos or appointment in next 3 mos     Recent Outpatient Visits              2 months ago Epistaxis    [facility], [facility], Raj Lopez,     Office Visit    2 months ago Labral tear of long head of right biceps tendon, initial encounter    [facility], [place],     Office Visit    3 months ago Labral tear of long head of right biceps tendon, [facility]     [facility]    3 months ago Labral tear of long head of right biceps tendon, [facility] encounter    [facility]    Telemedicine    4 months ago Rotator cuff tear arthropathy of right shoulder    [facility]    [facility]          Future Appointments          Provider Department Appt Notes    In 2 weeks Jt Oshea PA-C [facility] 6 mth f/up                      lisinopril 30 MG Oral Tab 90 tablet 0     Sig: Take 1 tablet (30 mg total) by mouth daily.       Hypertension Medications Protocol Failed - 6/25/2024  8:37 AM        Failed - Last BP reading less than 140/90     BP Readings from Last 1 Encounters:   01/05/24 159/79               Passed - CMP or BMP in past 12 months        Passed - In person appointment or virtual visit in the past 12 mos or appointment in next 3 mos     Recent Outpatient Visits              2 months ago Epistaxis    [facility], [facility], Raj Lopez,     Office Visit    2 months ago Labral tear of long head of right biceps tendon, initial encounter    [facility], [place], DO    Office Visit    3 months ago Labral tear of long head of right biceps tendon, initial encounter    [facility], [place], DO    Office Visit    3 months ago Labral tear of long head of right biceps tendon, initial encounter    [facility], [facility], [place], DO    Telemedicine    4 months ago Rotator cuff tear arthropathy of right shoulder    [facility], [facility], [place], [place]    Office Visit          Future Appointments         Provider Department Appt Notes    In 2 weeks Jt Oshea PA-C [facility] 6 mth f/up                    Passed - EGFRCR or GFRNAA > 50     GFR Evaluation  EGFRCR: 87 , resulted on 4/26/2024            metoprolol tartrate 25 MG Oral Tab 180 tablet 3     Sig: Take 1 tablet (25 mg total) by mouth 2 (two) times daily.       Hypertension Medications Protocol Failed - 6/25/2024  8:37 AM        Failed - Last BP reading less than 140/90     BP  Readings from Last 1 Encounters:   01/05/24 159/79               Passed - CMP or BMP in past 12 months        Passed - In person appointment or virtual visit in the past 12 mos or appointment in next 3 mos     Recent Outpatient Visits              2 months ago Epistaxis    [facility], [facility], Raj Lopez, DO    Office Visit    2 months ago Labral tear of long head of right biceps tendon, initial encounter    [facility], [facility], [place], DO    Office Visit    3 months ago Labral tear of long head of right biceps tendon, initial encounter    [facility], [facility], [place], DO    Office Visit    3 months ago Labral tear of long head of right biceps tendon, initial encounter    [facility], [facility], [place], DO    Telemedicine    4 months ago Rotator cuff tear arthropathy of right shoulder    [facility], [facility], [place], [place]    Office Visit          Future Appointments         Provider Department Appt Notes    In 2 weeks Jt Oshea PA-C [facility] 6 mth f/up                    Passed - EGFRCR or GFRNAA > 50     GFR Evaluation  EGFRCR: 87 , resulted on 4/26/2024           Signed Prescriptions Disp Refills    amLODIPine 5 MG Oral Tab 90 tablet 3     Sig: Take 1 tablet (5 mg total) by mouth daily.       There is no refill protocol information for this order       escitalopram 10 MG Oral Tab 90 tablet 0     Sig: Take 1 tablet (10 mg total) by mouth daily.       There is no refill protocol information for this order        Future Appointments         Provider Department Appt Notes    In 2 weeks Jt Oshea PA-C [facility] 6 mth f/up          Recent Outpatient Visits              2 months ago [facility]  [facility], [facility], Raj Lopez, DO    Office Visit    2 months ago Labral tear of long head of right biceps tendon, initial encounter    [facility], [facility], [place], DO    Office Visit    3 months ago Labral tear of long head of right biceps tendon, initial encounter    [facility], [facility], [place], DO    Office Visit    3 months ago Labral tear of long head of right biceps tendon, initial encounter    [facility], [facility], [place], DO    Telemedicine    4 months ago Rotator cuff tear arthropathy of right shoulder    [facility], [facility], [place], DO    Office Visit

## 2024-06-25 NOTE — TELEPHONE ENCOUNTER
Per patient, he is no longer using [facility] .   He is using [facility] in [place] now.  Preferred pharmacy updated .   Requesting refills to be sent to [facility] .   Per patient, he is taking metoprolol tartrate 25 mg twice a day (not 50 mg twice a day ) ===see below.     RN re sent the remaining  refill for  amlodipine and escitalopram .    Medications pended. Routing for protocol.      Medication record;    metoprolol tartrate 50 MG Oral Tab  Take 1 tablet (50 mg total) by mouth 2 (two) times daily. Dispense: 180 tablet, Refills: 1 ordered        12/19/2023 -- [facility] [address] @ [address], [phone], [phone] --  -- -- Report     Patient Requested Removal  Discontinue  Keep Active  Patient taking differently: 25 mg Oral 2 times daily, Reported on 1/5/2024

## 2024-07-03 RX ORDER — ESCITALOPRAM OXALATE 10 MG/1
10 TABLET ORAL DAILY
Qty: 90 TABLET | Refills: 0 | OUTPATIENT
Start: 2024-07-03

## 2024-07-11 ENCOUNTER — OFFICE VISIT (OUTPATIENT)
Dept: PULMONOLOGY | Facility: CLINIC | Age: 54
End: 2024-07-11
Payer: COMMERCIAL

## 2024-07-11 VITALS
BODY MASS INDEX: 42.22 KG/M2 | WEIGHT: 269 LBS | SYSTOLIC BLOOD PRESSURE: 132 MMHG | HEIGHT: 67 IN | HEART RATE: 80 BPM | DIASTOLIC BLOOD PRESSURE: 73 MMHG | OXYGEN SATURATION: 97 % | RESPIRATION RATE: 16 BRPM

## 2024-07-11 DIAGNOSIS — Z72.0 TOBACCO ABUSE: ICD-10-CM

## 2024-07-11 DIAGNOSIS — J44.9 CHRONIC OBSTRUCTIVE PULMONARY DISEASE, UNSPECIFIED COPD TYPE (HCC): ICD-10-CM

## 2024-07-11 DIAGNOSIS — G47.33 OSA ON CPAP: Primary | ICD-10-CM

## 2024-07-11 DIAGNOSIS — Z87.891 HISTORY OF TOBACCO USE: ICD-10-CM

## 2024-07-11 RX ORDER — FLUTICASONE FUROATE, UMECLIDINIUM BROMIDE AND VILANTEROL TRIFENATATE 200; 62.5; 25 UG/1; UG/1; UG/1
1 POWDER RESPIRATORY (INHALATION) DAILY
Qty: 1 EACH | Refills: 5 | Status: SHIPPED | OUTPATIENT
Start: 2024-07-11

## 2024-07-11 RX ORDER — VARENICLINE TARTRATE 0.5 MG/1
TABLET, FILM COATED ORAL
Qty: 11 TABLET | Refills: 0 | Status: SHIPPED | OUTPATIENT
Start: 2024-07-11

## 2024-07-11 NOTE — PATIENT INSTRUCTIONS
Quitting smoking is one of the most important things you can do for your health. The sooner you quit smoking, the greater the benefits. It is never too late to quit smoking.    Strategies for quitting smoking:  Set a quit date. If you have difficulty with \"all-or-nothing\"/cold turkey quitting, you can gradually cut down in anticipation of quit date further in the future.  Tell family, friends, and co-workers about your plan to quit and request support. Request individuals who also smoke to not smoke around you.  Remove tobacco and vaping products from your environment.  Total abstinence is essential. Not even a single puff after the quit date is important.  Anticipate potential withdrawal symptoms so you can have a plan in place to address symptoms if needed. Symptoms can include: cravings, depressed mood, anxiety, irritability/anger, restlessness, sleep disturbances, difficulty concentrating, and increased appetite.    Resources:  [place] Quit Line  Call 1-866-QUIT-Yes  http://quityes.org/    National Cancer [place] - many good resources  7-194-72K-QUIT  http://smokefree.gov/    SmokefreeTXT - get text reminders and tips and encouragment  http://smokefree.gov/smokefreetxt    Charles Duenas's Easy Way to Stop Smoking - book    Risks of smoking:  -Doubles a person's risk of developing coronary artery disease, a condition that can lead to heart attack. One year after stopping smoking, the risk of dying from coronary artery disease is reduced by approximately one-half and continues to decline over time.  -Increases risk of stroke.  -Increases risk of lung disease including chronic obstructive pulmonary disease. Much of the lung damage that occurs from smoking is irreversible but quitting smoking can reduce further lung damage.  -Cigarette smoking is responsible for nearly 90% of cases of lung cancer.  -Increases risk of other types of cancer, including cancers of the head and neck, esophagus, pancreas, and  bladder.  -Increases risk of osteoporosis.  -Increases risk of peptic ulcer disease.  -Contributes to erectile dysfunction.  -Secondhand smoke exposure increases risk of lung cancer, coronary artery disease, and stroke.    Varenicline:  *Please call me after 5-6 days of taking varenicline so we can determine if we can increase the dose.  -Take 0.5 mg daily for 3 days. Then increase to 0.5 mg twice daily for 4 days. Then increase to 1 mg twice daily thereafter.  -Take with food and full glass of water.  -Start varenicline 1 week before quit date if you have quit date planned.  -If no quit date planned, you should stop smoking 1-4 weeks after starting varenicline.  -Most common side effects are GI symptoms including nausea/vomiting/changes in bowel habits and sleep disturbances including insomnia/abnormal dreams. Please call our office at [phone] or send me a Delight message if you experience any of these side effects so we can determine most appropriate dose for you or if medication needs to be discontinued.  -Serious side effects include worsening depression, suicidal or homicidal thoughts, and seizures. Discontinue varenicline immediately.     Low dose CT chest in December 2024. Order is in.

## 2024-07-11 NOTE — PROGRESS NOTES
Pulmonary Progress Note    History of Present Illness:  Robert Yin is a 54 year old male presenting to pulmonary clinic today for follow up.    He has mild MADELIN which becomes severe in REM. He uses CPAP nightly and notes he cannot sleep without it. He received a CPAP through [employer] CPAP recall. Unable to facilitate data download from CPAP device today.     He remains on Wixela 500-50 and Spiriva. Previously insurance did not cover Trelegy. He wants to try again as he has new insurance. He uses albuterol 4x per day. He uses nebulizer every other day. No hospitalizations or prednisone for COPD in the last 6 months. Overall symptoms are stable. He has dyspnea with minimal exertion (less than 1 block). He has chronic cough which is unchanged. No wheezing. No shortness of breath at rest. He continues to struggle with vertigo and would not be able to do pulmonary rehab due to this. He continues to smoke 5 cigarettes/day on average. He wants to quit. He tried nicotine patch with nicotine inhaler without success. He tried varenicline in the past and had vivid dreams but thinks he was on full dose. He tried bupropion previously but did not notice benefit. No history of depression, suicidal ideation, or seizures. He is getting low dose CT screenings, last one was 12/2023 and showed no signs of lung cancer.    Social History: Current smoker (5 cigarettes/day with 30 pyh)    Medications: Acetaminophen-codeine, albuterol, alprazolam, amlodipine, ascorbic acid, aspirin, escitalopram, ezetimibe, wixela, spiriva, ipratropium-albuterol, lisinopril, metoprolol, multivitamin, omega 3 fatty acid, repatha, tumeric    Review of Systems:   Constitutional: No fever or chills. No weight loss or weight gain.  HEENT: No congestion or postnasal drip.  Cardio: No chest pain.  Respiratory: See HPI.  GI: No abdominal pain or acid reflux.  Extremities: No lower extremity swelling or pain.   Neurologic: +Vertigo. No headache.  Skin: No  rash.  Psych: No depression.     Physical Exam:  /73   Pulse 80   Resp 16   Ht 5' 7\" (1.702 m)   Wt 269 lb (122 kg)   SpO2 97%   BMI 42.13 kg/m²    Constitutional: Obese. No acute distress.   HEENT: Head NC/AT. PEERL. No tonsillar or uvula enlargement.  Cardio: Regular rate and rhythm. Normal S1 and S2. No murmurs.   Respiratory: Thorax symmetrical with no labored breathing. Diminished breath sounds bilaterally. No wheezing, rhonchi, or crackles.   Extremities: No clubbing or cyanosis. No LE edema. No calf tenderness.  Neurologic: A&Ox3. No gross motor deficits.  Skin: Warm, dry.  Lymphatic: No cervical or supraclavicular lymphadenopathy.  Psych: Calm, cooperative. Pleasant affect.    Results:  -PFTs 3/2022: Very severe obstructive defect with significant bronchodilator response.   -LDCT screening 12/2023: No signs of lung cancer. Recommend next exam 12/2024.    Assessment/Plan:  MADELIN - doing well. Unable to facilitate data download. Patient needs to take SD card to [facility].  Plan:  -Patient to take SD card to [facility] and request data download faxed to our office  -Vigilance with CPAP nightly  -Avoid alcohol and sedating drugs  -Never drive if at all sleepy    Severe COPD - stable and no exacerbations. On triple inhaled therapy (wixela, spiriva). Insurance previously did not cover Trelegy but has new insurance so wants to try. Candidate for pulm rehab but unable due to severe vertigo.  Plan:  -Trelegy  -Albuterol MDI or DuoNebs as needed  -Recommend influenza and pneumonia vaccines - declines  -Candidate for pulm rehab - unable at this time due to vertigo    Tobacco abuse - still smoking, wants to quit. Has tried [place], bupropion, NRT with patch/lozenge/inhaler, and varenicline. Had vivid dreams with varenicline. Discussed option to retry varenicline and consider dose reduction in event of side effects. Will start with 0.5 mg daily x3 days then BID x4 days, and patient to call  after 5 days so we can determine if he can continue/increase dose.  Plan:  -Varenicline and patient to call 5 days after starting  -Complete smoking cessation  -Annual LDCT screening program - due 1/2024    Jt Oshea PA-C  Pulmonary Medicine  7/11/2024

## 2024-07-29 ENCOUNTER — TELEPHONE (OUTPATIENT)
Dept: PULMONOLOGY | Facility: CLINIC | Age: 54
End: 2024-07-29

## 2024-07-29 RX ORDER — TIOTROPIUM BROMIDE 18 UG/1
18 CAPSULE ORAL; RESPIRATORY (INHALATION) DAILY
Qty: 90 CAPSULE | Refills: 1 | Status: CANCELLED | OUTPATIENT
Start: 2024-07-29

## 2024-07-29 NOTE — TELEPHONE ENCOUNTER
Pharmacy states it is over $300 for medication. Please send alternative.       Current Outpatient Medications:     fluticasone-umeclidin-vilant (TRELEGY ELLIPTA) 200-62.5-25 MCG/ACT Inhalation Aerosol Powder, Breath Activated, Inhale 1 puff into the lungs daily. Rinse mouth, gargle, and spit to follow., Disp: 1 each, Rfl: 5

## 2024-07-29 NOTE — TELEPHONE ENCOUNTER
Spoke to pharmacy, cost of Trelegy is $300, goes to deductible. Spoke to patient and he would prefer to go back on Wixela and Spiriva.     Jt-Please sign pended order if agreeable

## 2024-07-29 NOTE — TELEPHONE ENCOUNTER
Patient states that he just left the company that supplies him his Cpap and supplies.  He was informed that they uploaded results and can be reviewed.  Patient states that he is eligible for a new Cpap in September.  Please call

## 2024-07-29 NOTE — TELEPHONE ENCOUNTER
A catheter was exchanged for a (CATHETER 6FR MPA2 CRV 100CM 2 SH RADOPQ BRAID SUP TRQ ANGIO) catheter. Fax with data download received from [employer]. Placed in Jt's folder for review.

## 2024-07-29 NOTE — TELEPHONE ENCOUNTER
Spoke to [facility] and left a message regarding getting patient's download data.    Left message with patient to confirm data he is referring to his CPAP data download

## 2024-07-29 NOTE — TELEPHONE ENCOUNTER
Patient is calling back.  Patient states that he prefers to restart Spiriva and he is [facility]

## 2024-07-30 ENCOUNTER — TELEPHONE (OUTPATIENT)
Dept: PULMONOLOGY | Facility: CLINIC | Age: 54
End: 2024-07-30

## 2024-07-30 RX ORDER — TIOTROPIUM BROMIDE 18 UG/1
18 CAPSULE ORAL; RESPIRATORY (INHALATION) DAILY
Qty: 30 CAPSULE | Refills: 5 | Status: SHIPPED | OUTPATIENT
Start: 2024-07-30

## 2024-07-30 RX ORDER — FLUTICASONE PROPIONATE AND SALMETEROL 500; 50 UG/1; UG/1
1 POWDER RESPIRATORY (INHALATION) 2 TIMES DAILY
Qty: 60 EACH | Refills: 5 | Status: SHIPPED | OUTPATIENT
Start: 2024-07-30 | End: 2024-08-29

## 2024-07-30 NOTE — TELEPHONE ENCOUNTER
Alternation requested:Over $300.00 for 1 inhaler.    Not Listed:    Ghada Cui 200-62.5-25  Sig:  InhLW 1 PUFF INTO THE LUNGS DAILY.  RISE MOUTH,GARGLE,AND SPIT TO FOLLOW.     QTY: 60.0      Current Outpatient Medications   Medication Sig Dispense Refill

## 2024-07-30 NOTE — TELEPHONE ENCOUNTER
Data download reviewed. Avg daily usage is 9 hours and 21 min with residual respiratory events of 1.5/h on CPAP 10 CWP.

## 2024-08-05 ENCOUNTER — TELEPHONE (OUTPATIENT)
Dept: FAMILY MEDICINE CLINIC | Facility: CLINIC | Age: 54
End: 2024-08-05

## 2024-08-05 NOTE — TELEPHONE ENCOUNTER
Prescriber response form for alprazolam and tiotropium has been received and need so be filled out place in red folder to be reviewed.

## 2024-08-07 NOTE — TELEPHONE ENCOUNTER
Tristan, Did Dr Flor by chance give this form to you? I couldn't find it at the MA desk so wasn't sure if you had completed it.

## 2024-08-08 ENCOUNTER — LAB ENCOUNTER (OUTPATIENT)
Dept: LAB | Age: 54
End: 2024-08-08
Attending: INTERNAL MEDICINE
Payer: COMMERCIAL

## 2024-08-08 DIAGNOSIS — K76.0 HEPATIC STEATOSIS: Primary | ICD-10-CM

## 2024-08-08 DIAGNOSIS — E83.52 HYPERCALCEMIA: ICD-10-CM

## 2024-08-08 DIAGNOSIS — D72.829 LEUKOCYTOSIS, UNSPECIFIED TYPE: ICD-10-CM

## 2024-08-08 DIAGNOSIS — K74.00 HEPATIC FIBROSIS: ICD-10-CM

## 2024-08-08 LAB
ANION GAP SERPL CALC-SCNC: 1 MMOL/L (ref 0–18)
BASOPHILS # BLD AUTO: 0.12 X10(3) UL (ref 0–0.2)
BASOPHILS NFR BLD AUTO: 1.4 %
BUN BLD-MCNC: 16 MG/DL (ref 9–23)
BUN/CREAT SERPL: 15.1 (ref 10–20)
CALCIUM BLD-MCNC: 10.3 MG/DL (ref 8.7–10.4)
CHLORIDE SERPL-SCNC: 110 MMOL/L (ref 98–112)
CO2 SERPL-SCNC: 30 MMOL/L (ref 21–32)
CREAT BLD-MCNC: 1.06 MG/DL
DEPRECATED RDW RBC AUTO: 45.8 FL (ref 35.1–46.3)
EGFRCR SERPLBLD CKD-EPI 2021: 82 ML/MIN/1.73M2 (ref 60–?)
EOSINOPHIL # BLD AUTO: 0.37 X10(3) UL (ref 0–0.7)
EOSINOPHIL NFR BLD AUTO: 4.5 %
ERYTHROCYTE [DISTWIDTH] IN BLOOD BY AUTOMATED COUNT: 13.1 % (ref 11–15)
FASTING STATUS PATIENT QL REPORTED: YES
GLUCOSE BLD-MCNC: 119 MG/DL (ref 70–99)
HCT VFR BLD AUTO: 47.7 %
HGB BLD-MCNC: 16 G/DL
IMM GRANULOCYTES # BLD AUTO: 0.02 X10(3) UL (ref 0–1)
IMM GRANULOCYTES NFR BLD: 0.2 %
LYMPHOCYTES # BLD AUTO: 2.51 X10(3) UL (ref 1–4)
LYMPHOCYTES NFR BLD AUTO: 30.2 %
MCH RBC QN AUTO: 31.8 PG (ref 26–34)
MCHC RBC AUTO-ENTMCNC: 33.5 G/DL (ref 31–37)
MCV RBC AUTO: 94.8 FL
MONOCYTES # BLD AUTO: 0.78 X10(3) UL (ref 0.1–1)
MONOCYTES NFR BLD AUTO: 9.4 %
NEUTROPHILS # BLD AUTO: 4.51 X10 (3) UL (ref 1.5–7.7)
NEUTROPHILS # BLD AUTO: 4.51 X10(3) UL (ref 1.5–7.7)
NEUTROPHILS NFR BLD AUTO: 54.3 %
OSMOLALITY SERPL CALC.SUM OF ELEC: 294 MOSM/KG (ref 275–295)
PLATELET # BLD AUTO: 178 10(3)UL (ref 150–450)
POTASSIUM SERPL-SCNC: 4.9 MMOL/L (ref 3.5–5.1)
RBC # BLD AUTO: 5.03 X10(6)UL
SODIUM SERPL-SCNC: 141 MMOL/L (ref 136–145)
WBC # BLD AUTO: 8.3 X10(3) UL (ref 4–11)

## 2024-08-08 PROCEDURE — 80048 BASIC METABOLIC PNL TOTAL CA: CPT

## 2024-08-08 PROCEDURE — 36415 COLL VENOUS BLD VENIPUNCTURE: CPT

## 2024-08-08 PROCEDURE — 85025 COMPLETE CBC W/AUTO DIFF WBC: CPT

## 2024-08-20 NOTE — TELEPHONE ENCOUNTER
Please review; protocol failed/No Protocol    Last Office Visit: 12/08/2023  Sent Jeannet to patient to schedule an appointment.     Requested Prescriptions   Pending Prescriptions Disp Refills    ESCITALOPRAM 10 MG Oral Tab [Pharmacy Med Name: ESCITALOPRAM 10 MG TABLET] 90 tablet 0     Sig: TAKE 1 TABLET BY MOUTH EVERY DAY       Psychiatric Non-Scheduled (Anti-Anxiety) Failed - 8/16/2024  5:38 PM        Failed - In person appointment or virtual visit in the past 6 mos or appointment in next 3 mos     Recent Outpatient Visits              1 month ago MADELIN on CPAP    [facility], [facility], Jt Hong PA-C    [facility]    [facility], [place], DO    Office Visit    4 months ago Labral tear of long head of right biceps tendon, initial encounter    [facility], [place],     Office Visit    4 months ago Labral tear of long head of right biceps tendon, initial encounter    [facility], [place],     Office Visit    5 months ago Labral tear of long head of right biceps tendon, initial encounter    [facility], [place], DO    Telemedicine                      Failed - Depression Screening completed within the past 12 months             Recent Outpatient Visits              1 month ago MADELIN on CPAP    [facility], [facility], Jt Hong PA-C    [facility]     Office Visit    4 months ago Labral tear of long head of right biceps tendon, initial encounter    [facility], [place],     Office Visit    4 months ago Labral tear of long head of right biceps tendon, initial encounter     [facility]    Office Visit    5 months ago Labral tear of long head of right biceps tendon, initial encounter    [facility], [facility], [place], DO    Telemedicine

## 2024-08-21 RX ORDER — ESCITALOPRAM OXALATE 10 MG/1
10 TABLET ORAL DAILY
Qty: 30 TABLET | Refills: 0 | Status: SHIPPED | OUTPATIENT
Start: 2024-08-21

## 2024-08-21 NOTE — TELEPHONE ENCOUNTER
To pharmacy: Needs to make an apt for further refills   The patient read his [employer] message to schedule an appointment.

## 2024-08-26 RX ORDER — IPRATROPIUM BROMIDE AND ALBUTEROL SULFATE 2.5; .5 MG/3ML; MG/3ML
3 SOLUTION RESPIRATORY (INHALATION) EVERY 6 HOURS PRN
Qty: 180 ML | Refills: 1 | Status: SHIPPED | OUTPATIENT
Start: 2024-08-26

## 2024-08-26 NOTE — TELEPHONE ENCOUNTER
Jt- please sign pended order for ipratropium- albuterol, if agreeable    Last office visit: 7/11/24  Last refill: 6/12/24

## 2024-08-27 ENCOUNTER — TELEPHONE (OUTPATIENT)
Dept: PULMONOLOGY | Facility: CLINIC | Age: 54
End: 2024-08-27

## 2024-08-27 NOTE — TELEPHONE ENCOUNTER
Spoke with patient informed him CT Lung Screening is due 12/2024, follow up appointment scheduled with Jt BEJARANO on 1/2/25 at 10am, verified date, time, patient verbalized understanding to all, patient transferred to Central Scheduling.

## 2024-09-27 RX ORDER — IPRATROPIUM BROMIDE AND ALBUTEROL SULFATE 2.5; .5 MG/3ML; MG/3ML
3 SOLUTION RESPIRATORY (INHALATION) EVERY 6 HOURS PRN
Qty: 1080 ML | Refills: 5 | Status: SHIPPED | OUTPATIENT
Start: 2024-09-27

## 2024-09-27 NOTE — TELEPHONE ENCOUNTER
Patient needs refill is out of it IPRATROPIUM-ALBUTEROL 0.5-2.5 (3) MG/3ML Inhalation Solution please follow up

## 2024-09-27 NOTE — TELEPHONE ENCOUNTER
Last refill: 8/26/24  Last visit: 7/11/24  Next visit: 1/2/25    Jt- please sign pended order if agreeable

## 2024-09-30 RX ORDER — ASPIRIN 81 MG/1
81 TABLET ORAL DAILY
Qty: 90 TABLET | Refills: 1 | Status: SHIPPED | OUTPATIENT
Start: 2024-09-30

## 2024-09-30 NOTE — TELEPHONE ENCOUNTER
Please review. Protocol Failed; No Protocol    Requested Prescriptions   Pending Prescriptions Disp Refills    ASPIRIN LOW DOSE 81 MG Oral Tab EC [Pharmacy Med Name: ASPIRIN EC 81 MG TABLET] 90 tablet 1     Sig: TAKE 1 TABLET BY MOUTH EVERY DAY       Aspirin Protocol Failed - 9/24/2024  8:54 AM        Failed - In person appointment or virtual visit in the past 6 mos or appointment in next 3 mos     Recent Outpatient Visits              2 months ago MADELIN on CPAP    [facility] [place]    Office Visit    5 months ago Epistaxis    [facility], [facility], Raj Lopez,     Office Visit    6 months ago Labral tear of long head of right biceps tendon, initial encounter    [facility] [place], [place],     Office Visit    6 months ago Labral tear of long head of right biceps tendon, initial encounter    [facility], [facility], [place],     Office Visit    6 months ago Labral tear of long head of right biceps tendon, initial encounter    [facility], [place],     Telemedicine          Future Appointments         Provider Department Appt Notes    In 2 months [facility] CT RM1 [facility] [place]     In 3 months Jt Oshea PA-C [facility] CPAP                           Future Appointments         Provider Department Appt Notes    In 2 months [facility] CT RM1 [facility] - [place]     In 3 months Jt Oshea PA-C [facility] CPAP          Recent Outpatient Visits              2 months ago [facility]IN [facility]    [facility]    5 months ago Epistaxis    [facility], Raj Lopez,      Office Visit    6 months ago Labral tear of long head of right biceps tendon, initial encounter    [facility], [facility], [place], DO    Office Visit    6 months ago Labral tear of long head of right biceps tendon, initial encounter    [facility], [facility], [place],     Office Visit    6 months ago Labral tear of long head of right biceps tendon, initial encounter    [facility], [facility], [place], DO    Telemedicine

## 2024-10-07 RX ORDER — FLUTICASONE PROPIONATE AND SALMETEROL 500; 50 UG/1; UG/1
1 POWDER RESPIRATORY (INHALATION) 2 TIMES DAILY
Qty: 1 EACH | Refills: 5 | Status: SHIPPED | OUTPATIENT
Start: 2024-10-07 | End: 2024-11-06

## 2024-10-07 NOTE — TELEPHONE ENCOUNTER
Last prescribed on 7/30/24, patient never picked up prescription    Dr Manriquez- please sign pended order for Jt if agreeable

## 2024-10-07 NOTE — TELEPHONE ENCOUNTER
Patient request New RX    Not listed:   Wixela    Current Outpatient Medications   Medication Sig Dispense Refill

## 2024-10-10 DIAGNOSIS — F41.9 ANXIETY: ICD-10-CM

## 2024-10-11 NOTE — TELEPHONE ENCOUNTER
Please review. Protocol Failed; No Protocol      Recent fills: 4/17/2024, 5/16/2024, 6/25/2024  Last Rx written: 6/25/2024  Last office visit: 12/8/2023          Requested Prescriptions   Pending Prescriptions Disp Refills    ALPRAZOLAM 1 MG Oral Tab [Pharmacy Med Name: ALPRAZOLAM 1 MG TABLET] 60 tablet 0     Sig: TAKE 1 TABLET BY MOUTH NIGHTLY AS NEEDED FOR SLEEP       Controlled Substance Medication Failed - 10/11/2024  3:55 PM        Failed - This medication is a controlled substance - forward to provider to refill               Future Appointments         Provider Department Appt Notes    In 2 months LMB CT RM1 [facility] CT - [place]     In 2 months Jt Oshea PA-C [facility] CPAP          Recent Outpatient Visits              3 months ago MADELIN on CPAP    [facility], [facility] Jt Oshea PA-C    Office Visit    5 months ago Epistaxis    [facility], [facility], Raj Lopez,     Office Visit    6 months ago Labral tear of long head of right biceps tendon, [facility] encounter    [facility]     Office Visit    6 months ago Labral tear of long head of right biceps tendon[facility]     Office Visit    6 months ago Labral tear of long head of right biceps tendon, initial encounter    [facility], [place]    Telemedicine

## 2024-10-12 RX ORDER — ALPRAZOLAM 1 MG
1 TABLET ORAL NIGHTLY PRN
Qty: 60 TABLET | Refills: 0 | Status: SHIPPED | OUTPATIENT
Start: 2024-10-12

## 2024-10-16 NOTE — TELEPHONE ENCOUNTER
90 day supply requested         escitalopram 10 MG Oral Tab, Take 1 tablet (10 mg total) by mouth daily., Disp: 30 tablet, Rfl: 0   ral Cap, Take 3,000 mg by mouth daily., Disp: , Rfl:

## 2024-10-18 ENCOUNTER — TELEPHONE (OUTPATIENT)
Dept: FAMILY MEDICINE CLINIC | Facility: CLINIC | Age: 54
End: 2024-10-18

## 2024-10-18 RX ORDER — CHLORAL HYDRATE 500 MG
3000 CAPSULE ORAL DAILY
Qty: 90 CAPSULE | Refills: 2 | Status: SHIPPED | OUTPATIENT
Start: 2024-10-18

## 2024-10-18 RX ORDER — ESCITALOPRAM OXALATE 10 MG/1
10 TABLET ORAL DAILY
Qty: 30 TABLET | Refills: 0 | Status: SHIPPED | OUTPATIENT
Start: 2024-10-18

## 2024-10-18 NOTE — TELEPHONE ENCOUNTER
Labs completed 4/2024 and 8/2024. Please advise if additional labs due prior to appointment on 11/19/2024.

## 2024-10-18 NOTE — TELEPHONE ENCOUNTER
Please Review. Protocol Failed; No Protocol   omega-3 fatty acids 1000 MG Oral Capv Last ordered: 8 years ago (4/20/2016) by Reported External/Patient   Recent Visits  Date Type Provider Dept   12/08/23 Office Visit Xin Flor MD [facility]   06/09/23 Office Visit Xin Flor MD [facility]   Showing recent visits within past 540 days with a meds authorizing provider and meeting all other requirements  Future Appointments  No visits were found meeting these conditions.  Showing future appointments within next 150 days with a meds authorizing provider and meeting all other requirements    Requested Prescriptions   Pending Prescriptions Disp Refills    escitalopram 10 MG Oral Tab 90 tablet 0     Sig: Take 1 tablet (10 mg total) by mouth daily.       Psychiatric Non-Scheduled (Anti-Anxiety) Failed - 10/18/2024 12:06 PM        Failed - In person appointment or virtual visit in the past 6 mos or appointment in next 3 mos     Recent Outpatient Visits              3 months ago MADELIN on CPAP    [facility], [facility] Jt Oshea PA-C    Office Visit    6 months ago Epistaxis    [facility], [facility], Raj Lopez,     Office Visit    6 months ago Labral tear of long head of right biceps tendon, initial encounter    [facility], [place],     Office Visit    6 months ago Labral tear of long head of right biceps tendon, initial encounter    [facility], [place],     Office Visit    7 months ago Labral tear of long head of right biceps tendon, initial encounter    [facility] [place] [facility]    Telemedicine          Future Appointments         Provider Department Appt Notes    In 2 months [facility] CT RM1 [facility] CT - [place]     In 2 months Jt Oshea PA-C [facility]  [facility] CPAP                    Failed - Depression Screening completed within the past 12 months          omega-3 fatty acids 1000 MG Oral Cap  0     Sig: Take 3,000 mg by mouth daily.       There is no refill protocol information for this order            Future Appointments         Provider Department Appt Notes    In 2 months B CT RM1 [facility] CT - [place]     In 2 months Jt Oshea PA-C [facility] CPAP          Recent Outpatient Visits              3 months ago MADELIN on CPAP    [facility], [facility] Jt Oshea PA-C    Office Visit    6 months ago Epistaxis    [facility], [facility], Raj Lopez,     Office Visit    6 months ago Labral tear of long head of right biceps tendon, initial encounter    [facility] [place] [place]    Office Visit    6 months ago Labral tear of long head of right biceps tendon, [facility]    Office Visit    7 months ago Labral tear of long head of right biceps tendon, initial encounter    [facility]    [facility]

## 2024-10-18 NOTE — TELEPHONE ENCOUNTER
Patient called to request las prior to his scheduled appointment 11/19/2024.    Asked patient if he wanted to schedule a physical, patient declined.

## 2024-11-04 ENCOUNTER — TELEPHONE (OUTPATIENT)
Facility: CLINIC | Age: 54
End: 2024-11-04

## 2024-11-04 NOTE — TELEPHONE ENCOUNTER
[facility]st [facility]:     Prothrombin Time (PT/INR) (Order #[id number]) on 8/8/24     Hepatic Function Panel (7) (Order #[id number]) on 8/8/24     AFP, Tumor Marker, Serum (Order #[id number]) on 8/8/24     US LIVER (CPT=76705) (Order #[id number]) on 8/8/24

## 2024-11-04 NOTE — TELEPHONE ENCOUNTER
Last Refill:    ALPRAZolam (XANAX) 0.25 MG tablet 20 tablet 0 9/30/2024 --    Sig - Route: TAKE 1 TABLET BY MOUTH DAILY AS NEEDED FOR ANXIETY -          Last visit :                 10.8.24  Continue alprazolam 0.25 mg daily as needed for intense situational anxiety/panic attack       F/U:          1.30.25                 Pt calling and would like an update on referral.  Please advise.

## 2024-11-19 ENCOUNTER — OFFICE VISIT (OUTPATIENT)
Dept: FAMILY MEDICINE CLINIC | Facility: CLINIC | Age: 54
End: 2024-11-19
Payer: COMMERCIAL

## 2024-11-19 VITALS
HEIGHT: 67 IN | HEART RATE: 69 BPM | DIASTOLIC BLOOD PRESSURE: 60 MMHG | OXYGEN SATURATION: 97 % | WEIGHT: 268 LBS | RESPIRATION RATE: 20 BRPM | BODY MASS INDEX: 42.06 KG/M2 | SYSTOLIC BLOOD PRESSURE: 120 MMHG | TEMPERATURE: 99 F

## 2024-11-19 DIAGNOSIS — I25.10 CORONARY ARTERY DISEASE DUE TO CALCIFIED CORONARY LESION: ICD-10-CM

## 2024-11-19 DIAGNOSIS — J43.8 OTHER EMPHYSEMA (HCC): ICD-10-CM

## 2024-11-19 DIAGNOSIS — Z00.00 ANNUAL PHYSICAL EXAM: ICD-10-CM

## 2024-11-19 DIAGNOSIS — R73.9 HYPERGLYCEMIA: Primary | ICD-10-CM

## 2024-11-19 DIAGNOSIS — F41.9 ANXIETY: ICD-10-CM

## 2024-11-19 DIAGNOSIS — I25.84 CORONARY ARTERY DISEASE DUE TO CALCIFIED CORONARY LESION: ICD-10-CM

## 2024-11-19 PROCEDURE — 99396 PREV VISIT EST AGE 40-64: CPT | Performed by: FAMILY MEDICINE

## 2024-11-19 RX ORDER — AMLODIPINE BESYLATE 5 MG/1
5 TABLET ORAL DAILY
Qty: 90 TABLET | Refills: 3 | Status: SHIPPED | OUTPATIENT
Start: 2024-11-19

## 2024-11-19 RX ORDER — TIOTROPIUM BROMIDE 18 UG/1
18 CAPSULE ORAL; RESPIRATORY (INHALATION) DAILY
Qty: 30 CAPSULE | Refills: 5 | Status: SHIPPED | OUTPATIENT
Start: 2024-11-19

## 2024-11-19 RX ORDER — FLUTICASONE PROPIONATE AND SALMETEROL 500; 50 UG/1; UG/1
1 POWDER RESPIRATORY (INHALATION) 2 TIMES DAILY
COMMUNITY
Start: 2024-11-05 | End: 2024-11-19

## 2024-11-19 RX ORDER — METOPROLOL TARTRATE 25 MG/1
25 TABLET, FILM COATED ORAL 2 TIMES DAILY
Qty: 180 TABLET | Refills: 3 | Status: SHIPPED | OUTPATIENT
Start: 2024-11-19

## 2024-11-19 RX ORDER — ESCITALOPRAM OXALATE 5 MG/1
5 TABLET ORAL DAILY
Qty: 90 TABLET | Refills: 1 | Status: SHIPPED | OUTPATIENT
Start: 2024-11-19

## 2024-11-19 RX ORDER — FLUTICASONE PROPIONATE AND SALMETEROL 500; 50 UG/1; UG/1
1 POWDER RESPIRATORY (INHALATION) 2 TIMES DAILY
Qty: 60 EACH | Refills: 0 | Status: SHIPPED | OUTPATIENT
Start: 2024-11-19

## 2024-11-19 RX ORDER — ALPRAZOLAM 1 MG/1
1 TABLET ORAL NIGHTLY PRN
Qty: 60 TABLET | Refills: 3 | Status: SHIPPED | OUTPATIENT
Start: 2024-11-19

## 2024-11-19 RX ORDER — LISINOPRIL 30 MG/1
30 TABLET ORAL DAILY
Qty: 90 TABLET | Refills: 0 | Status: SHIPPED | OUTPATIENT
Start: 2024-11-19

## 2024-11-19 RX ORDER — EZETIMIBE 10 MG/1
10 TABLET ORAL EVERY EVENING
Qty: 90 TABLET | Refills: 1 | Status: SHIPPED | OUTPATIENT
Start: 2024-11-19

## 2024-11-19 RX ORDER — IPRATROPIUM BROMIDE AND ALBUTEROL SULFATE 2.5; .5 MG/3ML; MG/3ML
3 SOLUTION RESPIRATORY (INHALATION) EVERY 6 HOURS PRN
Qty: 1080 ML | Refills: 5 | Status: SHIPPED | OUTPATIENT
Start: 2024-11-19

## 2024-11-19 RX ORDER — ALBUTEROL SULFATE 90 UG/1
INHALANT RESPIRATORY (INHALATION)
Qty: 8.5 G | Refills: 5 | Status: SHIPPED | OUTPATIENT
Start: 2024-11-19

## 2024-11-19 NOTE — PROGRESS NOTES
Subjective:   Patient ID: Robert Yin is a 54 year old male.    HPI  Here for annual physical   Overall doing well   Needs refill of medications   Also anxiety controlled well does not feel he needs lexapro would like to lower the dose   Still smokes but less  History/Other:   Review of Systems    Constitutional: Negative.  Negative for activity change, appetite change, diaphoresis and fatigue.     Respiratory: dyspnea controlled by inhalers   Cardiovascular: Negative.  Negative for chest pain, palpitations and leg swelling.   Gastrointestinal: Negative.  Negative for abdominal pain.   Skin: Negative.           Psychiatric/Behavioral:see hpi       Current Outpatient Medications   Medication Sig Dispense Refill    WIXELA INHUB 500-50 MCG/ACT Inhalation Aerosol Powder, Breath Activated Inhale 1 puff into the lungs 2 (two) times daily. 60 each 0    tiotropium (SPIRIVA HANDIHALER) 18 MCG Inhalation Cap Inhale 1 capsule (18 mcg total) into the lungs daily. 30 capsule 5    metoprolol tartrate 25 MG Oral Tab Take 1 tablet (25 mg total) by mouth 2 (two) times daily. 180 tablet 3    lisinopril 30 MG Oral Tab Take 1 tablet (30 mg total) by mouth daily. 90 tablet 0    ipratropium-albuterol 0.5-2.5 (3) MG/3ML Inhalation Solution Take 3 mL by nebulization every 6 (six) hours as needed. 1080 mL 5    ezetimibe 10 MG Oral Tab Take 1 tablet (10 mg total) by mouth every evening. 90 tablet 1    amLODIPine 5 MG Oral Tab Take 1 tablet (5 mg total) by mouth daily. 90 tablet 3    ALPRAZolam 1 MG Oral Tab Take 1 tablet (1 mg total) by mouth nightly as needed for Sleep. 60 tablet 3    albuterol 108 (90 Base) MCG/ACT Inhalation Aero Soln INHALE 2 PUFFS BY MOUTH EVERY 6 HOURS AS NEEDED FOR SHORTNESS OF BREATH OR WHEEZING 8.5 g 5    escitalopram (LEXAPRO) 5 MG Oral Tab Take 1 tablet (5 mg total) by mouth daily. 90 tablet 1    omega-3 fatty acids 1000 MG Oral Cap Take 3,000 mg by mouth daily. 90 capsule 2    aspirin (ASPIRIN LOW  DOSE) 81 MG Oral Tab EC Take 1 tablet (81 mg total) by mouth daily. 90 tablet 1    Turmeric (QC TUMERIC COMPLEX) 500 MG Oral Cap Take by mouth.      ascorbic acid 1000 MG Oral Tab Take 1 tablet (1,000 mg total) by mouth daily.      REPATHA SURECLICK 140 MG/ML Subcutaneous Solution Auto-injector Inject twice monthly      Multiple Vitamin (MULTI-VITAMINS) Oral Tab Take  by mouth.      varenicline 0.5 MG Oral Tab Take 1 tablet (0.5 mg) by oral route every day for 3 days with a glass of water after meals, then twice a day in the morning and in the evening for 4 days. 11 tablet 0    acetaminophen-codeine (TYLENOL WITH CODEINE #3) 300-30 MG Oral Tab Take 1 tablet by mouth every 6 (six) hours as needed for Pain. 30 tablet 0     Allergies:Allergies[1]    Objective:   Physical Exam  Constitutional:       Appearance: He is well-developed.   Cardiovascular:      Rate and Rhythm: Normal rate and regular rhythm.      Heart sounds: Normal heart sounds.   Pulmonary:      Effort: Pulmonary effort is normal.      Breath sounds: Normal breath sounds.   Abdominal:      General: Bowel sounds are normal.      Palpations: Abdomen is soft.   Skin:     General: Skin is warm and dry.   Neurological:      Mental Status: He is alert.      Deep Tendon Reflexes: Reflexes are normal and symmetric.         Assessment & Plan:   1. Hyperglycemia    2. Anxiety    Needs HbA1C   Diescussed about diet and exercise   Lowered lexapro   F/u in 3 months   Hypertension controlled   History of heart disease - to see cardiologist in few months     Orders Placed This Encounter   Procedures    Hemoglobin A1C       Meds This Visit:  Requested Prescriptions     Signed Prescriptions Disp Refills    WIXELA INHUB 500-50 MCG/ACT Inhalation Aerosol Powder, Breath Activated 60 each 0     Sig: Inhale 1 puff into the lungs 2 (two) times daily.    tiotropium (SPIRIVA HANDIHALER) 18 MCG Inhalation Cap 30 capsule 5     Sig: Inhale 1 capsule (18 mcg total) into the lungs  daily.    metoprolol tartrate 25 MG Oral Tab 180 tablet 3     Sig: Take 1 tablet (25 mg total) by mouth 2 (two) times daily.    lisinopril 30 MG Oral Tab 90 tablet 0     Sig: Take 1 tablet (30 mg total) by mouth daily.    ipratropium-albuterol 0.5-2.5 (3) MG/3ML Inhalation Solution 1080 mL 5     Sig: Take 3 mL by nebulization every 6 (six) hours as needed.    ezetimibe 10 MG Oral Tab 90 tablet 1     Sig: Take 1 tablet (10 mg total) by mouth every evening.    amLODIPine 5 MG Oral Tab 90 tablet 3     Sig: Take 1 tablet (5 mg total) by mouth daily.    ALPRAZolam 1 MG Oral Tab 60 tablet 3     Sig: Take 1 tablet (1 mg total) by mouth nightly as needed for Sleep.    albuterol 108 (90 Base) MCG/ACT Inhalation Aero Soln 8.5 g 5     Sig: INHALE 2 PUFFS BY MOUTH EVERY 6 HOURS AS NEEDED FOR SHORTNESS OF BREATH OR WHEEZING    escitalopram (LEXAPRO) 5 MG Oral Tab 90 tablet 1     Sig: Take 1 tablet (5 mg total) by mouth daily.       Imaging & Referrals:  None         [1]   Allergies  Allergen Reactions    Neosporin [Neomycin-Bacitracin-Polymyxin] RASH

## 2024-11-27 RX ORDER — ESCITALOPRAM OXALATE 10 MG/1
10 TABLET ORAL DAILY
Qty: 30 TABLET | Refills: 0 | OUTPATIENT
Start: 2024-11-27

## 2024-11-27 NOTE — TELEPHONE ENCOUNTER
Duplicate request, previously addressed.   DOSE DECREASED last visit     11/19/24 office visit note ;  Assessment & Plan:  1. Hyperglycemia    2. Anxiety    Needs HbA1C   Diescussed about diet and exercise   Lowered lexapro   F/u in 3 months     Medication Changes      Escitalopram Oxalate 5 mg Oral Daily

## 2024-12-23 ENCOUNTER — HOSPITAL ENCOUNTER (OUTPATIENT)
Dept: CT IMAGING | Age: 54
Discharge: HOME | End: 2024-12-23
Attending: PHYSICIAN ASSISTANT
Payer: COMMERCIAL

## 2024-12-23 ENCOUNTER — LAB ENCOUNTER (OUTPATIENT)
Dept: LAB | Age: 54
End: 2024-12-23
Attending: PHYSICIAN ASSISTANT
Payer: COMMERCIAL

## 2024-12-23 ENCOUNTER — HOSPITAL ENCOUNTER (OUTPATIENT)
Dept: ULTRASOUND IMAGING | Age: 54
Discharge: HOME | End: 2024-12-23
Attending: INTERNAL MEDICINE
Payer: COMMERCIAL

## 2024-12-23 DIAGNOSIS — Z87.891 HISTORY OF TOBACCO USE, PRESENTING HAZARDS TO HEALTH: ICD-10-CM

## 2024-12-23 DIAGNOSIS — K74.00 HEPATIC FIBROSIS: ICD-10-CM

## 2024-12-23 DIAGNOSIS — R73.9 HYPERGLYCEMIA: ICD-10-CM

## 2024-12-23 DIAGNOSIS — K76.0 HEPATIC STEATOSIS: ICD-10-CM

## 2024-12-23 LAB
AFP-TM SERPL-MCNC: <2.2 NG/ML
ALBUMIN SERPL-MCNC: 5 G/DL (ref 3.2–4.8)
ALP LIVER SERPL-CCNC: 77 U/L
ALT SERPL-CCNC: 48 U/L
AST SERPL-CCNC: 26 U/L (ref ?–34)
BILIRUB DIRECT SERPL-MCNC: 0.1 MG/DL (ref ?–0.3)
BILIRUB SERPL-MCNC: 0.4 MG/DL (ref 0.3–1.2)
EST. AVERAGE GLUCOSE BLD GHB EST-MCNC: 123 MG/DL (ref 68–126)
HBA1C MFR BLD: 5.9 % (ref ?–5.7)
INR BLD: 0.85 (ref 0.8–1.2)
PROT SERPL-MCNC: 7.4 G/DL (ref 5.7–8.2)
PROTHROMBIN TIME: 12.1 SECONDS (ref 11.6–14.8)

## 2024-12-23 PROCEDURE — 71271 CT THORAX LUNG CANCER SCR C-: CPT | Performed by: PHYSICIAN ASSISTANT

## 2024-12-23 PROCEDURE — 76705 ECHO EXAM OF ABDOMEN: CPT | Performed by: INTERNAL MEDICINE

## 2024-12-23 PROCEDURE — 82105 ALPHA-FETOPROTEIN SERUM: CPT

## 2024-12-23 PROCEDURE — 36415 COLL VENOUS BLD VENIPUNCTURE: CPT

## 2024-12-23 PROCEDURE — 80076 HEPATIC FUNCTION PANEL: CPT

## 2024-12-23 PROCEDURE — 85610 PROTHROMBIN TIME: CPT

## 2024-12-23 PROCEDURE — 83036 HEMOGLOBIN GLYCOSYLATED A1C: CPT

## 2024-12-24 ENCOUNTER — TELEPHONE (OUTPATIENT)
Facility: CLINIC | Age: 54
End: 2024-12-24

## 2024-12-24 NOTE — TELEPHONE ENCOUNTER
Fernando Whitehead MD  12/23/2024  6:27 PM CST Back to Top      I spoke to Al.  He is feeling well.  He continues on his diet.  His liver chemistries have normalized.  Synthetic function is normal.  Alpha-fetoprotein is normal.  Liver ultrasound reveals increased echogenicity as expected.  Liver span has decreased from 18.2 cm in length down to 17 cm.  All of these changes are favorable.  He will continue his diet.  Laboratory testing and a liver ultrasound will be repeated in 6 months.  Will consider elastography at that time.  He will follow-up in the office in the first quarter of 2025.     GI RNs: Please enter liver ultrasound/elastography, CMP, CBC, alpha-fetoprotein lab recall for 6 months.

## 2025-01-02 ENCOUNTER — OFFICE VISIT (OUTPATIENT)
Dept: PULMONOLOGY | Facility: CLINIC | Age: 55
End: 2025-01-02
Payer: COMMERCIAL

## 2025-01-02 VITALS
OXYGEN SATURATION: 95 % | SYSTOLIC BLOOD PRESSURE: 128 MMHG | WEIGHT: 260 LBS | HEART RATE: 76 BPM | RESPIRATION RATE: 14 BRPM | DIASTOLIC BLOOD PRESSURE: 62 MMHG | HEIGHT: 67 IN | BODY MASS INDEX: 40.81 KG/M2

## 2025-01-02 DIAGNOSIS — Z72.0 TOBACCO ABUSE: ICD-10-CM

## 2025-01-02 DIAGNOSIS — J44.9 CHRONIC OBSTRUCTIVE PULMONARY DISEASE, UNSPECIFIED COPD TYPE (HCC): ICD-10-CM

## 2025-01-02 DIAGNOSIS — G47.33 OSA ON CPAP: Primary | ICD-10-CM

## 2025-01-02 PROCEDURE — 94761 N-INVAS EAR/PLS OXIMETRY MLT: CPT | Performed by: PHYSICIAN ASSISTANT

## 2025-01-02 PROCEDURE — 99214 OFFICE O/P EST MOD 30 MIN: CPT | Performed by: PHYSICIAN ASSISTANT

## 2025-01-02 RX ORDER — FLUTICASONE FUROATE, UMECLIDINIUM BROMIDE AND VILANTEROL TRIFENATATE 200; 62.5; 25 UG/1; UG/1; UG/1
1 POWDER RESPIRATORY (INHALATION) DAILY
Qty: 1 EACH | Refills: 11 | Status: SHIPPED | OUTPATIENT
Start: 2025-01-02

## 2025-01-02 NOTE — PROGRESS NOTES
Pulmonary Progress Note    History of Present Illness:  Robert Yin is a 54 year old male presenting to pulmonary clinic today for follow up.     Patient has mild MADELIN which becomes severe in REM. He is vigilant with CPAP and doing well with it. Data download demonstrates average daily usage of 8 hours and 46 minutes with residual respiratory events of 0.8/h on CPAP 10 CWP. His sleep is refreshing. He is eligible for new CPAP machine as he received his machine in 2019.    He has severe COPD. He is on Wixela 500/50 and Spiriva as his insurance previously did not cover Trelegy. He is switching to Medicare next month and states Trelegy will be more cost effective. He uses nebulizer 1x/day on average and albuterol inhaler 4x/day with improvement. No COPD exacerbations in the last year. He reports progressively worsening dyspnea on exertion and has to slow down after half of a block. He is sedentary largely due to persistent vertigo. No SOB at rest. He has chronic cough with yellow phlegm. No hemoptysis. He occasionally wheezes and albuterol helps. No chest pain or chest tightness. He continues to smoke 0.5 ppd. He is not ready to quit. He has tried [place] tobacco [place], varenicline, bupropion, and nicotine replacement therapy with patch and inhaler previously without success. He tried lower dose of varenicline 6 months ago and did not tolerate due to severe nightmares and sleep disruptions.     Social History: Current smoker (10 cigarettes/day with 30 pyh)     Medications: has a current medication list which includes the following prescription(s): wixela inhub, tiotropium, metoprolol tartrate, lisinopril, ipratropium-albuterol, ezetimibe, amlodipine, alprazolam, albuterol, escitalopram, omega-3 fatty acids, aspirin, turmeric, ascorbic acid, repatha sureclick, and thera/beta-carotene.    Review of Systems:   Constitutional: No fever or chills. No weight loss or weight gain.  HEENT: No congestion or postnasal  drip.  Cardio: No chest pain.  Respiratory: See HPI.  GI: No acid reflux.  Extremities: No lower extremity swelling or pain.   Neurologic: +Vertigo. No headache.  Psych: No depression.     Physical Exam:  /62   Pulse 76   Resp 14   Ht 5' 7\" (1.702 m)   Wt 260 lb (117.9 kg)   SpO2 95%   BMI 40.72 kg/m²    Constitutional: Obese. No acute distress.   HEENT: Head NC/AT. PEERL. No tonsillar or uvula enlargement.   Cardio: Regular rate and rhythm. Normal S1 and S2. No murmurs.   Respiratory: Thorax symmetrical with no labored breathing. Diminished breath sounds bilaterally. No wheezing, rhonchi, or crackles.   Extremities: No clubbing or cyanosis. No LE edema. No calf tenderness.  Neurologic: A&Ox3. No gross motor deficits.  Skin: Warm, dry.  Psych: Calm, cooperative. Pleasant affect.    Results:  -PFTs 3/2022: Very severe obstructive defect with significant bronchodilator response.     -LDCT screening 12/2024: Lung-RADS Category 2 - small RLL pulmonary nodule. Recommend annual LDCT lung screening, on/around 12/2025. Ascending thoracic aortic dilation measuring 42 x 40 mm.    -Ambulatory oximetry today in office: O2 saturations maintained 91% and above with ambulatory oximetry.    Assessment/Plan:  MADELIN  Doing well. Data download demonstrates excellent compliance and efficacy. His current CPAP is over 5 years old.  Plan:  -Order for new CPAP  -CPAP nightly  -Weight loss  -Avoid alcohol and sedating drugs  -Never drive if sleepy    Severe COPD  Progressively worsening dyspnea on exertion. Did not desaturate with ambulatory oximetry. No COPD exacerbations in the last year. On maximum inhaled therapy. Wants to switch to Trelegy (from Wixela, Spiriva) with new insurance. Candidate for pulm rehab but unable due to severe vertigo.  Plan:  -Follow up PFTs  -Continue Wixela and Spiriva for now; change to Trelegy when new insurance starts  -Albuterol MDI or DuoNebs as needed  -Recommend influenza and pneumonia vaccines  - declines  -Candidate for pulm rehab - unable to complete due to vertigo    Tobacco abuse  Still smoking half ppd. Has tried [place], bupropion, varenicline, and NRT with patch and inhaler without success. Did not tolerate varenicline x2 trials due to nightmares. Not ready to quit. Smoking cessation literature provided. LDCT for lung cancer screening completed 12/2024 with stable small pulmonary nodule and no evidence of lung cancer.  Plan:  -Encourage smoking cessation  -Annual LDCT screening program - due 12/2025    Enlarged ascending thoracic aorta  Follows with cardiology.  Plan:  -Follow up with cardiology re: monitoring    Follow up in 6 months or sooner if needed.    Jt Oshea PA-C  Pulmonary Medicine  1/2/2025

## 2025-01-02 NOTE — PROGRESS NOTES
Patient wants to wait to order replacement CPAP at 6 month follow up. Patient will be on Medicare by then.

## 2025-01-02 NOTE — PATIENT INSTRUCTIONS
Call to schedule pulmonary function testing - [phone].    You will be due for low dose lung screening CT in Dec 2025. We will send you a reminder.    Your low dose CT scan showed enlarged thoracic aorta. We can track the size of this on your annual CT scan but this scan does not have contract. Please follow up with your cardiologist for monitoring of this, as it should be monitored every 6-12 months.    Continue using CPAP nightly.    Continue Wixela and tiotropium for now. When your new insurance starts, you can start Trelegy 1 puff daily.    Continue albuterol 2 puffs every 6 hours as needed or nebulizer treatment every 6 hours as needed for shortness of breath, wheezing, and chest tightness.    Quitting smoking is one of the most important things you can do for your health. The sooner you quit smoking, the greater the benefits. It is never too late to quit smoking.    Strategies for quitting smoking:  Set a quit date. If you have difficulty with \"all-or-nothing\"/cold turkey quitting, you can gradually cut down in anticipation of quit date further in the future.  Tell family, friends, and co-workers about your plan to quit and request support. Request individuals who also smoke to not smoke around you.  Remove tobacco and vaping products from your environment.  Total abstinence is essential. Not even a single puff after the quit date is important.  Anticipate potential withdrawal symptoms so you can have a plan in place to address symptoms if needed. Symptoms can include: cravings, depressed mood, anxiety, irritability/anger, restlessness, sleep disturbances, difficulty concentrating, and increased appetite.    Resources:  [place] Quit Line  Call 1-866-QUIT-Yes  http://quityes.org/    National [employer] - many good resources  6-151-79C-QUIT  http://smokefree.gov/    SmokefreeTXT - get text reminders and tips and encouragment  http://smokefree.gov/smokefreetxt    Charles Duenas's Easy Way to Stop Smoking -  book    Risks of smoking:  -Doubles a person's risk of developing coronary artery disease, a condition that can lead to heart attack. One year after stopping smoking, the risk of dying from coronary artery disease is reduced by approximately one-half and continues to decline over time.  -Increases risk of stroke.  -Increases risk of lung disease including chronic obstructive pulmonary disease. Much of the lung damage that occurs from smoking is irreversible but quitting smoking can reduce further lung damage.  -Cigarette smoking is responsible for nearly 90% of cases of lung cancer.  -Increases risk of other types of cancer, including cancers of the head and neck, esophagus, pancreas, and bladder.  -Increases risk of osteoporosis.  -Increases risk of peptic ulcer disease.  -Contributes to erectile dysfunction.  -Secondhand smoke exposure increases risk of lung cancer, coronary artery disease, and stroke.

## 2025-02-02 NOTE — TELEPHONE ENCOUNTER
Dr Blake Lucero informed pt of note below verbalized understanding,but still feeling dizzy  When stand up ,off balance,still ear is muffled concerned because pt has not  been working, pt is a ,a  please advise,pt is also asking for work note.
Dr Dmitry De Anda please see note below and advise result in epic.
Informed patient of note below and provided #to schedule for therapy [phone],pt verbalized understanding.
MD Radha Simpson, RN   Caller: Unspecified (Yesterday,  1:08 PM)             Mri shows no inner ear tumor continue medication
MD Rhonda Samuels, RN   Caller: Unspecified (2 days ago,  1:08 PM)             Start vestibular therapy for dizziness I  prescribed meclizine
Per pt calling for mri results.  Please advise
Pt calling for MRI results - also needs note for work
Unknown if ever smoked

## 2025-02-07 ENCOUNTER — TELEPHONE (OUTPATIENT)
Dept: PULMONOLOGY | Facility: CLINIC | Age: 55
End: 2025-02-07

## 2025-02-10 NOTE — TELEPHONE ENCOUNTER
Patient checking status on message.  Patient doesn't want to work with [facility] as he has some difficulty with vendor.  Please call with update.  Thank you.

## 2025-02-10 NOTE — TELEPHONE ENCOUNTER
Spoke with [facility] at [facility]. Per [facility]- patient eligible to new CPAP device. Spoke with patient to see where he would like CPAP order to be sent to and he would like to have Respironics Dream Station CPAP device. Informed patient RN to contact [facility] and will get call back.

## 2025-02-11 NOTE — TELEPHONE ENCOUNTER
Spoke with Monico at [facility]. Per Monico: They do not carry [place] Respironics CPAP devices. Patient informed of this and would like CPAP order to be sent to [facility]. Informed patient to follow-up with [facility] in 1 week if he does not hear from them. Also informed patient to schedule follow-up visit with Jt BEJARANO between 31-90 days of receiving new CPAP device. Patient verbalized understanding.     CPAP replacement order sent to [facility] via [facility].

## 2025-02-26 ENCOUNTER — TELEPHONE (OUTPATIENT)
Dept: PULMONOLOGY | Facility: CLINIC | Age: 55
End: 2025-02-26

## 2025-02-26 NOTE — TELEPHONE ENCOUNTER
Patient calling states [facility] does not accept HMO's asking if can be sent to another company for CPAP. Please call.

## 2025-02-27 NOTE — TELEPHONE ENCOUNTER
Patient states he used to use [facility] but did not have a good experience. Patient told to call insurance to see if there are any other DME companies that are in network. Starting March 1st, [id number], ID#[id number]. Patient will call back after he speaks with his insurance

## 2025-02-27 NOTE — TELEPHONE ENCOUNTER
Order submitted to [facility] via [place]. Follow up appointment scheduled for 5/2/25 at 9:30am. Reviewed time, date, place and where to park. Patient verbalized understanding

## 2025-02-27 NOTE — TELEPHONE ENCOUNTER
Patient states that he will need to use [facility] due to insurance.  Patient would like referral for new CPAP machine.  Please call.

## 2025-03-06 ENCOUNTER — TELEPHONE (OUTPATIENT)
Dept: PULMONOLOGY | Facility: CLINIC | Age: 55
End: 2025-03-06

## 2025-03-06 NOTE — TELEPHONE ENCOUNTER
Ximena from [facility] called to inform that a secondary diagnosis is needed for the patient's CPAP. Please send the fax to #[phone]    See telephone encounter 2/26.

## 2025-03-07 NOTE — TELEPHONE ENCOUNTER
Called [facility] to find out about secondary diagnosis. Representative will have Nicci call office back

## 2025-03-10 NOTE — TELEPHONE ENCOUNTER
Per [place] message Nicci states secondary diagnosis could be any one of the following: excessive daytime sleepiness, impaired cognition, mood disorders, insomnia, hypertension, ischemic heart disease, or history of stroke    Jt- please addend office note from 1/2/25 to add one of the above as secondary diagnosis if agreeable

## 2025-03-11 DIAGNOSIS — F41.9 ANXIETY: ICD-10-CM

## 2025-03-11 RX ORDER — AMLODIPINE BESYLATE 5 MG/1
5 TABLET ORAL DAILY
Qty: 90 TABLET | Refills: 3 | Status: SHIPPED | OUTPATIENT
Start: 2025-03-11

## 2025-03-11 RX ORDER — LISINOPRIL 30 MG/1
30 TABLET ORAL DAILY
Qty: 90 TABLET | Refills: 3 | Status: SHIPPED | OUTPATIENT
Start: 2025-03-11

## 2025-03-11 RX ORDER — ASPIRIN 81 MG/1
81 TABLET ORAL DAILY
Qty: 90 TABLET | Refills: 3 | Status: SHIPPED | OUTPATIENT
Start: 2025-03-11

## 2025-03-11 RX ORDER — ESCITALOPRAM OXALATE 5 MG/1
5 TABLET ORAL DAILY
Qty: 90 TABLET | Refills: 3 | Status: SHIPPED | OUTPATIENT
Start: 2025-03-11

## 2025-03-11 RX ORDER — EZETIMIBE 10 MG/1
10 TABLET ORAL EVERY EVENING
Qty: 90 TABLET | Refills: 3 | Status: SHIPPED | OUTPATIENT
Start: 2025-03-11

## 2025-03-11 RX ORDER — TIOTROPIUM BROMIDE 18 UG/1
18 CAPSULE ORAL; RESPIRATORY (INHALATION) DAILY
Qty: 90 CAPSULE | Refills: 3 | Status: SHIPPED | OUTPATIENT
Start: 2025-03-11

## 2025-03-11 RX ORDER — METOPROLOL TARTRATE 25 MG/1
25 TABLET, FILM COATED ORAL 2 TIMES DAILY
Qty: 180 TABLET | Refills: 3 | Status: SHIPPED | OUTPATIENT
Start: 2025-03-11

## 2025-03-11 NOTE — TELEPHONE ENCOUNTER
Patient indicated that would like his medications refilled through [facility] mail order pharmacy. Going out of town on Thursday for 2 weeks. Wanted to make sure it gets refilled through the correct pharmacy.

## 2025-03-11 NOTE — TELEPHONE ENCOUNTER
Please review; protocol failed/No Protocol      Patient requesting all scripts be sent to mailorder    [facility]am     Recent Fills: 12/08/2024    Last Rx Written: 11/19/2024    Last Office Visit: 11/19/2024

## 2025-03-11 NOTE — TELEPHONE ENCOUNTER
Sent [employer] message to patient to verify which pharmacy- [facility] was not on patients preferred pharmacy list.       Escitalopram 5m month supply sent to [facility] on 2024    Metoprolol 25m year supply sent to [facility] on 2024    Ezetimibe 10m month supply sent to [facility] on 2024    Amlodipine 5m year supply sent to [facility] on 2024    Alprazolam 1m month supply sent to [facility] on 2024    Avis requesting

## 2025-03-11 NOTE — TELEPHONE ENCOUNTER
Called [facility] in [place]- cancelled remaining refills on Alprazolam. Patient requesting [facility]

## 2025-03-11 NOTE — TELEPHONE ENCOUNTER
metoprolol tartrate 25 MG Oral Tab, Take 1 tablet (25 mg total) by mouth 2 (two) times daily., Disp: 180 tablet, Rfl: 3      lisinopril 30 MG Oral Tab, Take 1 tablet (30 mg total) by mouth daily., Disp: 90 tablet, Rfl: 0      ezetimibe 10 MG Oral Tab, Take 1 tablet (10 mg total) by mouth every evening., Disp: 90 tablet, Rfl: 1      amLODIPine 5 MG Oral Tab, Take 1 tablet (5 mg total) by mouth daily., Disp: 90 tablet, Rfl: 3      ALPRAZolam 1 MG Oral Tab, Take 1 tablet (1 mg total) by mouth nightly as needed for Sleep., Disp: 60 tablet, Rfl: 3      escitalopram (LEXAPRO) 5 MG Oral Tab, Take 1 tablet (5 mg total) by mouth daily., Disp: 90 tablet, Rfl: 1      aspirin (ASPIRIN LOW DOSE) 81 MG Oral Tab EC, Take 1 tablet (81 mg total) by mouth daily., Disp: 90 tablet, Rfl: 1

## 2025-03-11 NOTE — TELEPHONE ENCOUNTER
Current Outpatient Medications   Medication Sig Dispense Refill    albuterol 108 (90 Base) MCG/ACT Inhalation Aero Soln INHALE 2 PUFFS BY MOUTH EVERY 6 HOURS AS NEEDED FOR SHORTNESS OF BREATH OR WHEEZING 8.5 g 5     Wixela inhub 500mcg-50mcg/dose powder for inhalation

## 2025-03-11 NOTE — TELEPHONE ENCOUNTER
Refill passed per Clinic protocol.    Patient requesting different pharmacy   Requested Prescriptions   Pending Prescriptions Disp Refills    ALPRAZolam 1 MG Oral Tab 180 tablet 0     Sig: Take 1 tablet (1 mg total) by mouth nightly as needed for Sleep.       Controlled Substance Medication Failed - 3/11/2025 10:54 AM        Failed - This medication is a controlled substance - forward to provider to refill        Passed - Medication is active on med list          amLODIPine 5 MG Oral Tab 90 tablet 3     Sig: Take 1 tablet (5 mg total) by mouth daily.       Hypertension Medications Protocol Passed - 3/11/2025 10:54 AM        Passed - CMP or BMP in past 12 months        Passed - Last BP reading less than 140/90     BP Readings from Last 1 Encounters:   01/02/25 128/62               Passed - In person appointment or virtual visit in the past 12 mos or appointment in next 3 mos     Recent Outpatient Visits              2 months ago [facility]    Office Visit    3 months ago [facility]glycemia    [facility]    Office Visit    8 months ago [facility]    Office Visit    10 months ago Epistaxis    [facility], Raj Lopez DO    Office Visit    11 months ago Labral tear of long head of right biceps tendon, initial encounter    [facility], [place] Zazueta DO    Office Visit          Future Appointments         Provider Department Appt Notes    In 1 month Jt Oshea PA-C [facility] CPAP f/u                    Passed - EGFRCR or GFRNAA > 50     GFR Evaluation  EGFRCR: 82 , resulted on 8/8/2024          Passed - Medication is active on med list          aspirin  (ASPIRIN LOW DOSE) 81 MG Oral Tab EC 90 tablet 3     Sig: Take 1 tablet (81 mg total) by mouth daily.       Aspirin Protocol Passed - 3/11/2025 10:54 AM        Passed - In person appointment or virtual visit in the past 6 mos or appointment in next 3 mos     Recent Outpatient Visits              2 months ago [facility]    Office Visit    3 months ago Hyperglycemia    [facility] Xin Saravia MD    Office Visit    8 months ago MADELIN on CPAP    [facility], Jt Hong PA-C    Office Visit    10 months ago Epistaxis    [facility], [facility], Raj Lopez DO    Office Visit    11 months ago Labral tear of long head of right biceps tendon, initial encounter    [facility], [place] Zazueta,     Office Visit          Future Appointments         Provider Department Appt Notes    In 1 month Jt Oshea PA-C [facility] CPAP f/u                    Passed - Medication is active on med list          escitalopram (LEXAPRO) 5 MG Oral Tab 90 tablet 3     Sig: Take 1 tablet (5 mg total) by mouth daily.       Psychiatric Non-Scheduled (Anti-Anxiety) Passed - 3/11/2025 10:54 AM        Passed - In person appointment or virtual visit in the past 6 mos or appointment in next 3 mos     Recent Outpatient Visits              2 months ago MADELIN on CPAP    [facility], Jt Hong PA-C    Office Visit    3 months ago Hyperglycemia    [facility] Xin Saravia MD    Office Visit    8 months ago [facility]AP    [facility]    Office Visit    10 months ago Epistaxis    [place]  [facility], Raj Lopez,     Office Visit    11 months ago Labral tear of long head of right biceps tendon, initial encounter    [facility], [place], DO    Office Visit          Future Appointments         Provider Department Appt Notes    In 1 month Jt Oshea PA-C [facility], [place] CPAP f/u                    Passed - Depression Screening completed within the past 12 months        Passed - Medication is active on med list          ezetimibe 10 MG Oral Tab 90 tablet 3     Sig: Take 1 tablet (10 mg total) by mouth every evening.       Cholesterol Medication Protocol Passed - 3/11/2025 10:54 AM        Passed - ALT < 80     Lab Results   Component Value Date    ALT 48 12/23/2024             Passed - ALT resulted within past year        Passed - Lipid panel within past 12 months     Lab Results   Component Value Date    CHOLEST 136 04/10/2024    TRIG 129 04/10/2024    HDL 66 (H) 04/10/2024    LDL 48 04/10/2024    VLDL 18 04/10/2024    NONHDLC 70 04/10/2024             Passed - In person appointment or virtual visit in the past 12 mos or appointment in next 3 mos     Recent Outpatient Visits              2 months ago MADELIN on CPAP    [facility] [place] [facility]    Office Visit    3 months ago Hyperglycemia    [facility] [place]    Office Visit    8 months ago MADELIN on CPAP    [facility], Jt Hong PA-C    Office Visit    10 months ago Epistaxis    [facility], Raj Lopez,     Office Visit    11 months ago Labral tear of long head of right biceps tendon, initial encounter    [facility], [place] DO    Office Visit          Future  Appointments         Provider Department Appt Notes    In 1 month Jt Oshea PA-C [facility], [facility], [place] CPAP f/u                    Passed - Medication is active on med list          metoprolol tartrate 25 MG Oral Tab 180 tablet 3     Sig: Take 1 tablet (25 mg total) by mouth 2 (two) times daily.       Hypertension Medications Protocol Passed - 3/11/2025 10:54 AM        Passed - CMP or BMP in past 12 months        Passed - Last BP reading less than 140/90     BP Readings from Last 1 Encounters:   01/02/25 128/62               Passed - In person appointment or virtual visit in the past 12 mos or appointment in next 3 mos     Recent Outpatient Visits              2 months ago MADELIN on CPAP    [facility], [facility] [place]    Office Visit    3 months ago Hyperglycemia    [facility], [facility], [place]    Office Visit    8 months ago MADELIN on CPAP    [facility], Jt Hong PA-C    Office Visit    10 months ago Epistaxis    [facility], Raj Lopez DO    Office Visit    11 months ago Labral tear of long head of right biceps tendon, initial encounter    [facility] [place],     Office Visit          Future Appointments         Provider Department Appt Notes    In 1 month Jt Oshea PA-C [facility], [place] CPAP f/u                    Passed - EGFRCR or GFRNAA > 50     GFR Evaluation  EGFRCR: 82 , resulted on 8/8/2024          Passed - Medication is active on med list          lisinopril 30 MG Oral Tab 90 tablet 3     Sig: Take 1 tablet (30 mg total) by mouth daily.       Hypertension Medications Protocol Passed - 3/11/2025 10:54 AM        Passed - CMP or BMP in past 12 months        Passed - Last BP  reading less than 140/90     BP Readings from Last 1 Encounters:   01/02/25 128/62               Passed - In person appointment or virtual visit in the past 12 mos or appointment in next 3 mos     Recent Outpatient Visits              2 months ago MADELIN on CPAP    [facility], Jt Hong PA-C    Office Visit    3 months ago Hyperglycemia    [facility], [facility], Xin Saravia MD    Office Visit    8 months ago MADELIN on CPAP    Counts include 234 beds at the [facility], Jt Hong PA-C    Office Visit    10 months ago Epistaxis    [facility], [facility], Raj Lopez DO    Office Visit    11 months ago Labral tear of long head of right biceps tendon, initial encounter    [facility], [place],     Office Visit          Future Appointments         Provider Department Appt Notes    In 1 month Jt Oshea PA-C Counts include 234 beds at the [facility], [place] CPAP f/u                    Passed - EGFRCR or GFRNAA > 50     GFR Evaluation  EGFRCR: 82 , resulted on 8/8/2024          Passed - Medication is active on med list          tiotropium (SPIRIVA HANDIHALER) 18 MCG Inhalation Cap 90 capsule 3     Sig: Inhale 1 capsule (18 mcg total) into the lungs daily.       Asthma & COPD Medication Protocol Passed - 3/11/2025 10:54 AM        Passed - Appointment in past 6 or next 3 months      Recent Outpatient Visits              2 months ago MADELIN on CPAP    Counts include 234 beds at the [facility], Jt Hong PA-C    Office Visit    3 months ago Hyperglycemia    [facility], Xin Saravia MD    Office Visit    8 months ago MADELIN on CPAP    [facility]    Office Visit    10 months ago Epistaxis     [place], [facility], Raj Lopez,     Office Visit    11 months ago Labral tear of long head of right biceps tendon, initial encounter    [place] [place], [facility]    Office Visit          Future Appointments         Provider Department Appt Notes    In 1 month Jt Oshea PA-C [place], [facility], [place] CPAP f/u                    Passed - Medication is active on med list

## 2025-03-12 RX ORDER — ALPRAZOLAM 1 MG/1
1 TABLET ORAL NIGHTLY PRN
Qty: 90 TABLET | Refills: 1 | Status: SHIPPED | OUTPATIENT
Start: 2025-03-12

## 2025-03-13 ENCOUNTER — PATIENT MESSAGE (OUTPATIENT)
Dept: PULMONOLOGY | Facility: CLINIC | Age: 55
End: 2025-03-13

## 2025-03-13 RX ORDER — UMECLIDINIUM 62.5 UG/1
1 AEROSOL, POWDER ORAL DAILY
Qty: 1 EACH | Refills: 5 | Status: SHIPPED | OUTPATIENT
Start: 2025-03-13

## 2025-03-13 RX ORDER — ALBUTEROL SULFATE 90 UG/1
INHALANT RESPIRATORY (INHALATION)
Qty: 1 EACH | Refills: 1 | Status: SHIPPED | OUTPATIENT
Start: 2025-03-13

## 2025-03-13 RX ORDER — FLUTICASONE PROPIONATE AND SALMETEROL 500; 50 UG/1; UG/1
1 POWDER RESPIRATORY (INHALATION) 2 TIMES DAILY
Qty: 60 EACH | Refills: 5 | Status: SHIPPED | OUTPATIENT
Start: 2025-03-13 | End: 2025-04-12

## 2025-03-13 NOTE — TELEPHONE ENCOUNTER
I spoke with the patient. He tried Trelegy earlier this year and did not like it so he resumed Wixela and Spiriva. He tells me Spiriva is not cost effective with his insurance. Will try Incruse instead.

## 2025-03-17 RX ORDER — FLUTICASONE PROPIONATE AND SALMETEROL 500; 50 UG/1; UG/1
1 POWDER RESPIRATORY (INHALATION) 2 TIMES DAILY
Qty: 60 EACH | Refills: 5 | OUTPATIENT
Start: 2025-03-17

## 2025-03-19 NOTE — TELEPHONE ENCOUNTER
Katina from [facility] called to ask if the office can send the scripts to fill the patient prescription.

## 2025-03-21 RX ORDER — LISINOPRIL 30 MG/1
30 TABLET ORAL DAILY
Qty: 90 TABLET | Refills: 3 | OUTPATIENT
Start: 2025-03-21

## 2025-03-21 RX ORDER — ESCITALOPRAM OXALATE 5 MG/1
5 TABLET ORAL DAILY
Qty: 90 TABLET | Refills: 1 | OUTPATIENT
Start: 2025-03-21

## 2025-03-21 NOTE — TELEPHONE ENCOUNTER
NEW PRESCRIPTION REQUEST: [facility] pharmacy    Drug: ALBUTEROL SULFATE HFA 90MCG/ACTUATION AEROSOL INHALER    Drug: WIXELA INHUB 500MCG-50MCG/DOSE POWDER FOR INHALATION

## 2025-03-26 NOTE — TELEPHONE ENCOUNTER
Spoke to spouse Kellee who said to disregard messages from [facility]. They are more expensive than local [facility].

## 2025-03-28 ENCOUNTER — TELEPHONE (OUTPATIENT)
Dept: PULMONOLOGY | Facility: CLINIC | Age: 55
End: 2025-03-28

## 2025-03-28 NOTE — TELEPHONE ENCOUNTER
Received fax from [facility] that patient is due for pap compliance between 4/28/25 and 6/26/25. Patient scheduled for appointment on 5/2/25

## 2025-04-28 ENCOUNTER — TELEPHONE (OUTPATIENT)
Dept: FAMILY MEDICINE CLINIC | Facility: CLINIC | Age: 55
End: 2025-04-28

## 2025-04-28 DIAGNOSIS — G47.33 OSA ON CPAP: ICD-10-CM

## 2025-04-28 DIAGNOSIS — E78.5 DYSLIPIDEMIA: ICD-10-CM

## 2025-04-28 DIAGNOSIS — J44.9 CHRONIC OBSTRUCTIVE PULMONARY DISEASE, UNSPECIFIED COPD TYPE (HCC): ICD-10-CM

## 2025-04-28 DIAGNOSIS — I10 ESSENTIAL HYPERTENSION: ICD-10-CM

## 2025-04-28 DIAGNOSIS — I25.10 ATHEROSCLEROSIS OF NATIVE CORONARY ARTERY OF NATIVE HEART WITHOUT ANGINA PECTORIS: Primary | ICD-10-CM

## 2025-04-28 NOTE — TELEPHONE ENCOUNTER
Spoke with patient, Date of Birth verified  He is req a referral to see pulmo and cardio.   He has appt with Dr Nicholson on 7/31/25 and will also need vascular test that was ordered by cardio.   Referral pended.   pls advise, thanks in advance.         Future Appointments   Date Time Provider Department Center   5/2/2025  9:30 AM Jt Oshea PA-C [facility]

## 2025-05-02 ENCOUNTER — ORDER TRANSCRIPTION (OUTPATIENT)
Dept: ADMINISTRATIVE | Facility: HOSPITAL | Age: 55
End: 2025-05-02

## 2025-05-02 ENCOUNTER — OFFICE VISIT (OUTPATIENT)
Dept: PULMONOLOGY | Facility: CLINIC | Age: 55
End: 2025-05-02
Payer: MEDICARE

## 2025-05-02 VITALS
DIASTOLIC BLOOD PRESSURE: 73 MMHG | HEIGHT: 67 IN | BODY MASS INDEX: 40.81 KG/M2 | SYSTOLIC BLOOD PRESSURE: 139 MMHG | OXYGEN SATURATION: 96 % | WEIGHT: 260 LBS | HEART RATE: 70 BPM

## 2025-05-02 DIAGNOSIS — Z87.891 PERSONAL HISTORY OF NICOTINE DEPENDENCE: ICD-10-CM

## 2025-05-02 DIAGNOSIS — Z72.0 TOBACCO ABUSE: ICD-10-CM

## 2025-05-02 DIAGNOSIS — J44.9 CHRONIC OBSTRUCTIVE PULMONARY DISEASE, UNSPECIFIED COPD TYPE (HCC): ICD-10-CM

## 2025-05-02 DIAGNOSIS — G47.33 OSA ON CPAP: Primary | ICD-10-CM

## 2025-05-02 DIAGNOSIS — J44.9 CHRONIC OBSTRUCTIVE PULMONARY DISEASE, UNSPECIFIED COPD TYPE (HCC): Primary | ICD-10-CM

## 2025-05-02 PROCEDURE — 3008F BODY MASS INDEX DOCD: CPT | Performed by: PHYSICIAN ASSISTANT

## 2025-05-02 PROCEDURE — 99214 OFFICE O/P EST MOD 30 MIN: CPT | Performed by: PHYSICIAN ASSISTANT

## 2025-05-02 PROCEDURE — 3075F SYST BP GE 130 - 139MM HG: CPT | Performed by: PHYSICIAN ASSISTANT

## 2025-05-02 PROCEDURE — 3078F DIAST BP <80 MM HG: CPT | Performed by: PHYSICIAN ASSISTANT

## 2025-05-02 NOTE — PATIENT INSTRUCTIONS
Call to schedule pulmonary function testing - [phone].  You will be due for lung screening CT scan again in December 2025. Just make sure you do this test after December 23, 2025 for insurance purposes.  Continue Wixela 1 puff twice daily and Spiriva daily.  Continue albuterol 2 puffs or nebulizer every 4 hours as needed for shortness of breath and wheezing.  Continue CPAP nightly.  Please let me know if you would like additional assistance with smoking cessation.

## 2025-05-02 NOTE — PROGRESS NOTES
Pulmonary Progress Note    History of Present Illness:  Robert Yin is a 55 year old male presenting to pulmonary clinic today for MADELIN. He received a new CPAP device and is doing well with it. He notes he cannot sleep without CPAP. Data download demonstrates average daily usage of 9 hours and 10 minutes with residual respiratory events of 0.5/h on CPAP 10 CWP.    He has severe COPD and is on Wixela and Spiriva. He tried Trelegy but felt his breathing was worse with this. He uses albuterol inhaler up to 6 times per day which helps. He has chronic dyspnea on exertion and chronic cough which are unchanged. He continues to smoke 0.5 ppd and is not ready to quit. Previously tried [place] tobacco quit line, varenicline, bupropion, and NRT with patch/inhaler/lozenge without success. He tried lower dose of varenicline and still did not tolerate due to severe nightmares and sleep disruptions.    Social History:  -Tobacco: Current smoker (10 cigarettes/day) with 30 pyh  -Exposures: Carb[employer] as a teenager    Medications: Albuterol, alprazolam, amlodipine, aspirin, escitalopram, ezetimibe, metoprolol tartrate, lisinopril, tiotropium, ipratropium-albuterol, omega-3 fatty acids, turmeric, ascorbic acid, repatha sureclick, thera/beta-carotene    Review of Systems:   Constitutional: No fever or chills. No weight loss or weight gain.  HEENT: No congestion or postnasal drip.  Cardio: No chest pain.  Respiratory: See HPI.  GI: No abdominal pain or acid reflux.  Extremities: No lower extremity swelling or pain.   Neurologic: +Vertigo. No headache.  Skin: No rash.  Psych: No depression.     Physical Exam:  /73   Pulse 70   Ht 5' 7\" (1.702 m)   Wt 260 lb (117.9 kg)   SpO2 96%   BMI 40.72 kg/m²    Constitutional: Obese. No acute distress.   HEENT: Head NC/AT. PEERL. No tonsillar or uvula enlargement.   Cardio: Regular rate and rhythm. Normal S1 and S2. No murmurs.   Respiratory: Thorax symmetrical with no labored breathing.  Diminished breath sounds bilaterally but otherwise clear to ausculation bilaterally. No wheezing, rhonchi, or crackles.   Extremities: No clubbing or cyanosis. No LE edema. No calf tenderness.  Neurologic: A&Ox3. No gross motor deficits.  Skin: Warm, dry.  Psych: Calm, cooperative. Pleasant affect.    Results:  -PFTs 3/2022: Very severe obstructive defect with significant bronchodilator response.      -LDCT screening 12/2024: Lung-RADS Category 2 - small RLL pulmonary nodule. Recommend annual LDCT lung screening, on/around 12/2025. Ascending thoracic aortic dilation measuring 42 x 40 mm.    Assessment/Plan:  MADELIN  Doing well. Data download demonstrates excellent compliance and efficacy.  Plan:  -Vigilance with CPAP nightly  -Weight loss  -Avoid alcohol and sedating drugs  -Never drive if sleepy  -Data download again in 1 year    Severe COPD  No COPD exacerbations in the last year. On maximum inhaled therapy. Did not like Trelegy and prefers to stay on Wixela and Spiriva. Candidate for pulm rehab but not interested due to vertigo.  Plan:  -Follow up PFTs  -Continue Wixela and Spiriva  -Albuterol MDI or DuoNebs as needed  -Recommend pneumonia vaccine - previously declined  -Candidate for pulm rehab - previously declined  -Follow up in 6 months    Tobacco abuse  Current 0.5 ppd with 30 pack-year history. Has tried [place] tobacco quit line, bupropion, varenicline, and NRT with patch and inhaler without success. Did not tolerate varenicline x2 trials due to nightmares. Not ready to quit. LDCT for lung cancer screening completed 12/2024 with stable small pulmonary nodule and no evidence of lung cancer.   Plan:  -Recommend smoking cessation  -Annual LDCT screening due 12/2025    Jt Oshea PA-C  Pulmonary Medicine  5/2/2025

## 2025-05-02 NOTE — PROGRESS NOTES
Confirmed address with patient for parking placard and placed in Jt BEJARANO's folder for completion

## 2025-05-06 ENCOUNTER — HOSPITAL ENCOUNTER (OUTPATIENT)
Dept: RESPIRATORY THERAPY | Facility: HOSPITAL | Age: 55
Discharge: HOME | End: 2025-05-06
Attending: PHYSICIAN ASSISTANT
Payer: MEDICARE

## 2025-05-06 DIAGNOSIS — J44.9 CHRONIC OBSTRUCTIVE PULMONARY DISEASE, UNSPECIFIED COPD TYPE (HCC): ICD-10-CM

## 2025-05-06 PROCEDURE — 94060 EVALUATION OF WHEEZING: CPT | Performed by: INTERNAL MEDICINE

## 2025-05-06 PROCEDURE — 94729 DIFFUSING CAPACITY: CPT | Performed by: INTERNAL MEDICINE

## 2025-05-06 PROCEDURE — 94726 PLETHYSMOGRAPHY LUNG VOLUMES: CPT | Performed by: INTERNAL MEDICINE

## 2025-05-06 NOTE — PROCEDURES
[facility]  [facility] of [facility]    PFT Interpretation    Robert Yin     1970 MRN [id number]   Height  67 inh  Age 55 year old   Weight  260 lbs  Sex Male         Spirometry:   FEV1 0.86 L which is 28% which is severely reduced  FEV1/FVC ratio 47% which is reduced  Severe obstructive lung disease on the spirometry    No significant bronchodilator response      FVL:   Decreased expiratory flow in the mid and low volume area indicating severe obstructive lung disease      Lung Volume:   TLC 5.54 L which is 85%  RV/TLC significantly increased indicating severe air trapping      DLCO:   64%      Impression:   Very severe COPD  Severe air trapping  Moderate reduction in DLCO  No significant bronchodilator response        Thank you for allowing me to participate in the care of your patient.    Denny Crespo MD  2025  3:06 PM

## 2025-05-09 ENCOUNTER — TELEPHONE (OUTPATIENT)
Dept: PULMONOLOGY | Facility: CLINIC | Age: 55
End: 2025-05-09

## 2025-05-19 ENCOUNTER — TELEPHONE (OUTPATIENT)
Dept: PULMONOLOGY | Facility: CLINIC | Age: 55
End: 2025-05-19

## 2025-05-19 NOTE — TELEPHONE ENCOUNTER
Patient called to speak to Jt about his appointment on 5/2/25.  He did not give any details.  Please call.

## 2025-05-29 RX ORDER — TIOTROPIUM BROMIDE 18 UG/1
18 CAPSULE ORAL; RESPIRATORY (INHALATION) DAILY
Qty: 90 CAPSULE | Refills: 3 | Status: SHIPPED | OUTPATIENT
Start: 2025-05-29

## 2025-05-29 NOTE — TELEPHONE ENCOUNTER
Refill passed per [facility] protocols.  6 months ago [facility], [facility], [place] Xin Flor MD       Requested Prescriptions   Pending Prescriptions Disp Refills    tiotropium (SPIRIVA HANDIHALER) 18 MCG Inhalation Cap 90 capsule 3     Sig: Inhale 1 capsule (18 mcg total) into the lungs daily.       Asthma & COPD Medication Protocol Failed - 5/29/2025 12:58 PM        Failed - Appointment in past 6 or next 3 months         Passed - Medication is active on med list

## 2025-06-12 ENCOUNTER — TELEPHONE (OUTPATIENT)
Dept: PULMONOLOGY | Facility: CLINIC | Age: 55
End: 2025-06-12

## 2025-06-12 RX ORDER — TIOTROPIUM BROMIDE 18 UG/1
1 CAPSULE ORAL; RESPIRATORY (INHALATION) DAILY
Qty: 90 CAPSULE | Refills: 3 | OUTPATIENT
Start: 2025-06-12

## 2025-06-12 NOTE — TELEPHONE ENCOUNTER
Patient requesting 90 days refills for Spiriva and Wixela.  Please call.  Thank you.    Current Outpatient Medications   Medication Sig Dispense Refill    tiotropium (SPIRIVA HANDIHALER) 18 MCG Inhalation Cap Inhale 1 capsule (18 mcg total) into the lungs daily. 90 capsule 3

## 2025-06-12 NOTE — TELEPHONE ENCOUNTER
tiotropium (SPIRIVA HANDIHALER) 18 MCG Inhalation Cap 90 capsule 3 5/29/2025 --    Sig - Route: Inhale 1 capsule (18 mcg total) into the lungs daily. - Inhalation    Sent to pharmacy as: Tiotropium Bromide Monohydrate 18 MCG Inhalation Capsule (Spiriva HandiHaler)    E-Prescribing Status: Receipt confirmed by pharmacy (5/29/2025 12:59 PM CDT)      Pharmacy    [facility] - [facility] [address] [facility] [phone], [phone]

## 2025-06-13 DIAGNOSIS — J44.9 CHRONIC OBSTRUCTIVE PULMONARY DISEASE, UNSPECIFIED COPD TYPE (HCC): Primary | ICD-10-CM

## 2025-06-13 DIAGNOSIS — G47.33 OSA ON CPAP: ICD-10-CM

## 2025-06-13 NOTE — TELEPHONE ENCOUNTER
Dr. Flor - new pulmonary referral pended, if appropriate.   Patient also asking you to manage his Wixela and Albuterol inhalers until he gets established with new pulmonary provider, [facility]    Spoke to patient, full name and date of birth verified.  Patient was dismissed by [facility].   Patient had seen Jt Oshea PA-C for 5 years.   They will no longer refill patient's inhalers.     Patient is also requesting a new referral for pulmonary, just not in the same group.   RN located Chavo Fritz in the [facility] office, patient stated that would be fine, but thinks there is also a pulmonary on the 1st floor of the hospital.     Patient would like to stay in [facility], and with [facility] if possible.    There is a [employer] message that was sent today stating that patient's Spiriva and Wixela will no longer be filled by Jt Oshea PA-C.     Patient will try to get established with a new pulmonary doctor as soon as possible.     Patient also needs a new order for his CT Lung LD Screen, due in December 2025 as follow up. His appointment in November 2025 was cancelled due to issue at CESAR Oshea's office.    Informed patient that Dr. Flor sent a new prescription 5/29/25 for the Spiriva, 90 capsules, 3 refills. Looks like it is in the process of being shipped 6/12/25.     Patient is asking if Dr. Flor will manage his Wixela and albuterol, until patient can get established with new provider.    Patient stated he is using both Spiriva and Wixela at the same time because Trelegy was not working for him.

## 2025-06-17 RX ORDER — FLUTICASONE PROPIONATE AND SALMETEROL 500; 50 UG/1; UG/1
1 POWDER RESPIRATORY (INHALATION) 2 TIMES DAILY
Qty: 60 EACH | Refills: 5 | Status: SHIPPED | OUTPATIENT
Start: 2025-06-17 | End: 2025-07-17

## 2025-06-17 RX ORDER — ALBUTEROL SULFATE 90 UG/1
INHALANT RESPIRATORY (INHALATION)
Qty: 1 EACH | Refills: 1 | Status: SHIPPED | OUTPATIENT
Start: 2025-06-17

## 2025-07-07 ENCOUNTER — TELEPHONE (OUTPATIENT)
Dept: FAMILY MEDICINE CLINIC | Facility: CLINIC | Age: 55
End: 2025-07-07

## 2025-07-07 RX ORDER — FLUTICASONE PROPIONATE AND SALMETEROL 500; 50 UG/1; UG/1
1 POWDER RESPIRATORY (INHALATION) 2 TIMES DAILY
Qty: 60 EACH | Refills: 5 | Status: SHIPPED | OUTPATIENT
Start: 2025-07-07 | End: 2025-08-06

## 2025-07-07 RX ORDER — ALBUTEROL SULFATE 90 UG/1
INHALANT RESPIRATORY (INHALATION)
Qty: 1 EACH | Refills: 1 | Status: SHIPPED | OUTPATIENT
Start: 2025-07-07

## 2025-07-07 NOTE — TELEPHONE ENCOUNTER
The patient called asking if his albuterol and wixela inhaler could be send to [facility]. Advised him to call the [facility] and have the script transferred, he stated he tried to do it but they would not, they said [facility] has to reach out them them, which [facility] said the doctor has to give approval.     Dr. Flor please advise, inhalers pended for your approval to center well. Albuterol and wixela.

## 2025-07-30 ENCOUNTER — TELEPHONE (OUTPATIENT)
Dept: FAMILY MEDICINE CLINIC | Facility: CLINIC | Age: 55
End: 2025-07-30

## 2025-07-30 DIAGNOSIS — H35.30 MACULAR DEGENERATION, UNSPECIFIED LATERALITY, UNSPECIFIED TYPE: Primary | ICD-10-CM

## 2025-07-31 ENCOUNTER — LAB ENCOUNTER (OUTPATIENT)
Dept: LAB | Age: 55
End: 2025-07-31
Attending: INTERNAL MEDICINE

## 2025-07-31 DIAGNOSIS — I25.10 CORONARY ATHEROSCLEROSIS OF NATIVE CORONARY ARTERY: ICD-10-CM

## 2025-07-31 DIAGNOSIS — I10 ESSENTIAL HYPERTENSION, MALIGNANT: Primary | ICD-10-CM

## 2025-07-31 LAB
ALBUMIN SERPL-MCNC: 5 G/DL (ref 3.2–4.8)
ALBUMIN/GLOB SERPL: 2.3 (ref 1–2)
ALP LIVER SERPL-CCNC: 82 U/L (ref 45–117)
ALT SERPL-CCNC: 44 U/L (ref 10–49)
ANION GAP SERPL CALC-SCNC: 7 MMOL/L (ref 0–18)
AST SERPL-CCNC: 27 U/L (ref ?–34)
BASOPHILS # BLD AUTO: 0.11 X10(3) UL (ref 0–0.2)
BASOPHILS NFR BLD AUTO: 1 %
BILIRUB SERPL-MCNC: 0.5 MG/DL (ref 0.3–1.2)
BUN BLD-MCNC: 16 MG/DL (ref 9–23)
BUN/CREAT SERPL: 14.3 (ref 10–20)
CALCIUM BLD-MCNC: 9.8 MG/DL (ref 8.7–10.4)
CHLORIDE SERPL-SCNC: 101 MMOL/L (ref 98–112)
CHOLEST SERPL-MCNC: 143 MG/DL (ref ?–200)
CO2 SERPL-SCNC: 28 MMOL/L (ref 21–32)
CREAT BLD-MCNC: 1.12 MG/DL (ref 0.7–1.3)
DEPRECATED RDW RBC AUTO: 43.8 FL (ref 35.1–46.3)
EGFRCR SERPLBLD CKD-EPI 2021: 77 ML/MIN/1.73M2 (ref 60–?)
EOSINOPHIL # BLD AUTO: 0.36 X10(3) UL (ref 0–0.7)
EOSINOPHIL NFR BLD AUTO: 3.4 %
ERYTHROCYTE [DISTWIDTH] IN BLOOD BY AUTOMATED COUNT: 12.9 % (ref 11–15)
FASTING PATIENT LIPID ANSWER: NO
FASTING STATUS PATIENT QL REPORTED: NO
GLOBULIN PLAS-MCNC: 2.2 G/DL (ref 2–3.5)
GLUCOSE BLD-MCNC: 129 MG/DL (ref 70–99)
HCT VFR BLD AUTO: 48 % (ref 39–53)
HDLC SERPL-MCNC: 51 MG/DL (ref 40–59)
HGB BLD-MCNC: 16.3 G/DL (ref 13–17.5)
IMM GRANULOCYTES # BLD AUTO: 0.04 X10(3) UL (ref 0–1)
IMM GRANULOCYTES NFR BLD: 0.4 %
LDLC SERPL CALC-MCNC: 59 MG/DL (ref ?–100)
LYMPHOCYTES # BLD AUTO: 2.68 X10(3) UL (ref 1–4)
LYMPHOCYTES NFR BLD AUTO: 25.5 %
MCH RBC QN AUTO: 31.2 PG (ref 26–34)
MCHC RBC AUTO-ENTMCNC: 34 G/DL (ref 31–37)
MCV RBC AUTO: 91.8 FL (ref 80–100)
MONOCYTES # BLD AUTO: 0.75 X10(3) UL (ref 0.1–1)
MONOCYTES NFR BLD AUTO: 7.1 %
NEUTROPHILS # BLD AUTO: 6.57 X10 (3) UL (ref 1.5–7.7)
NEUTROPHILS # BLD AUTO: 6.57 X10(3) UL (ref 1.5–7.7)
NEUTROPHILS NFR BLD AUTO: 62.6 %
NONHDLC SERPL-MCNC: 92 MG/DL (ref ?–130)
OSMOLALITY SERPL CALC.SUM OF ELEC: 285 MOSM/KG (ref 275–295)
PLATELET # BLD AUTO: 177 10(3)UL (ref 150–450)
POTASSIUM SERPL-SCNC: 5 MMOL/L (ref 3.5–5.1)
PROT SERPL-MCNC: 7.2 G/DL (ref 5.7–8.2)
RBC # BLD AUTO: 5.23 X10(6)UL (ref 4.3–5.7)
SODIUM SERPL-SCNC: 136 MMOL/L (ref 136–145)
TRIGL SERPL-MCNC: 203 MG/DL (ref 30–149)
VLDLC SERPL CALC-MCNC: 30 MG/DL (ref 0–30)
WBC # BLD AUTO: 10.5 X10(3) UL (ref 4–11)

## 2025-07-31 PROCEDURE — 36415 COLL VENOUS BLD VENIPUNCTURE: CPT

## 2025-07-31 PROCEDURE — 85025 COMPLETE CBC W/AUTO DIFF WBC: CPT

## 2025-07-31 PROCEDURE — 83695 ASSAY OF LIPOPROTEIN(A): CPT

## 2025-07-31 PROCEDURE — 80061 LIPID PANEL: CPT

## 2025-07-31 PROCEDURE — 80053 COMPREHEN METABOLIC PANEL: CPT

## 2025-08-01 LAB — LIPOPROTEIN (A): 23.4 NMOL/L

## (undated) DIAGNOSIS — R79.89 ABNORMAL LIVER FUNCTION TEST: ICD-10-CM

## (undated) DIAGNOSIS — R06.00 DYSPNEA, UNSPECIFIED TYPE: Primary | ICD-10-CM

## (undated) DIAGNOSIS — K75.81 NASH (NONALCOHOLIC STEATOHEPATITIS): Primary | ICD-10-CM

## (undated) DIAGNOSIS — J44.9 CHRONIC OBSTRUCTIVE PULMONARY DISEASE, UNSPECIFIED COPD TYPE (HCC): Primary | ICD-10-CM

## (undated) DIAGNOSIS — Z01.812 PRE-PROCEDURE LAB EXAM: Primary | ICD-10-CM

## (undated) DIAGNOSIS — Z11.59 ENCOUNTER FOR SCREENING FOR OTHER VIRAL DISEASES: ICD-10-CM

## (undated) DIAGNOSIS — R19.7 DIARRHEA, UNSPECIFIED TYPE: Primary | ICD-10-CM

## (undated) DIAGNOSIS — R74.8 ELEVATED ALKALINE PHOSPHATASE LEVEL: Primary | ICD-10-CM

## (undated) DEVICE — SUTURE ETHLN SZ 2-0 L18IN NONABSORB BLK

## (undated) DEVICE — CHLORAPREP 26ML APPLICATOR

## (undated) DEVICE — ELECTRODE ES L2.75IN XLN STD BLDE MOD E-Z CLN

## (undated) DEVICE — BLADE SHVR 13CM 4MM EXCLBR

## (undated) DEVICE — STRAP OR POS W3.5XL19IN FOAM STRRP W/ SLIP

## (undated) DEVICE — SUT VICRYL 2-0 CT-1 J339H

## (undated) DEVICE — ABDOMINAL PAD: Brand: CURITY

## (undated) DEVICE — SUTURE ETHILON 4-0 662G

## (undated) DEVICE — AMBIENT SUPER TURBOVAC 90 IFS: Brand: COBLATION

## (undated) DEVICE — SNARE ENDOSCOPIC 10MM ROUND

## (undated) DEVICE — GAMMEX® PI HYBRID SIZE 7.5, STERILE POWDER-FREE SURGICAL GLOVE, POLYISOPRENE AND NEOPRENE BLEND: Brand: GAMMEX

## (undated) DEVICE — KIT ENDO ORCAPOD 160/180/190

## (undated) DEVICE — SHOULDER: Brand: MEDLINE INDUSTRIES, INC.

## (undated) DEVICE — BLADE SHVR COOLCUT 13CM 4MM

## (undated) DEVICE — ADHESIVE SKIN TOP FOR WND CLSR DERMBND ADV

## (undated) DEVICE — SUT MONOCRYL 4-0 PS-1 Y935H

## (undated) DEVICE — TUBING IRR 16FT CNT WV 3 ASCP

## (undated) DEVICE — DRAPE SHEET LARGE 76X55

## (undated) DEVICE — DRAPE URO 112X63X131IN POLYPR FEN W/ PCH

## (undated) DEVICE — STERILE TETRA-FLEX CF, ELASTIC BANDAGE, 4" X 5.5YD: Brand: TETRA-FLEX™CF

## (undated) DEVICE — 3M™ MICROFOAM™ TAPE 1528-4: Brand: 3M™ MICROFOAM™

## (undated) DEVICE — INTENDED FOR TISSUE SEPARATION, AND OTHER PROCEDURES THAT REQUIRE A SHARP SURGICAL BLADE TO PUNCTURE OR CUT.: Brand: BARD-PARKER ® STAINLESS STEEL BLADES

## (undated) DEVICE — DRAPE,UNDRBUT,WHT GRAD PCH,CAPPORT,20/CS: Brand: MEDLINE

## (undated) DEVICE — SLEEVE CMPR VLCR STRL

## (undated) DEVICE — SUTURE PDS II 1 CT-1

## (undated) DEVICE — CANNULA 5.75 CLEAR AR-6560

## (undated) DEVICE — STERILE LATEX POWDER-FREE SURGICAL GLOVESWITH NITRILE COATING: Brand: PROTEXIS

## (undated) DEVICE — FORCEP RADIAL JAW 4

## (undated) DEVICE — DERMABOND CLOSURE 0.7ML TOPICL

## (undated) DEVICE — COVER SGL STRL LGHT HNDL BLU

## (undated) DEVICE — SOLUTION IRRIG 1000ML 0.9% NACL USP BTL

## (undated) DEVICE — SOL  .9 3000ML

## (undated) DEVICE — MEGADYNE E-Z CLEAN BLADE 2.75"

## (undated) DEVICE — STIRRUP STRAP W/SLING RING

## (undated) DEVICE — DRAPE SHEET LG

## (undated) DEVICE — OCCLUSIVE GAUZE STRIP,3% BISMUTH TRIBROMOPHENATE IN PETROLATUM BLEND: Brand: XEROFORM

## (undated) DEVICE — 60 ML SYRINGE LUER-LOCK TIP: Brand: MONOJECT

## (undated) DEVICE — DECANTER SPIKE TRANSFLOW

## (undated) DEVICE — 35 ML SYRINGE REGULAR TIP: Brand: MONOJECT

## (undated) DEVICE — KIT CLEAN ENDOKIT 1.1OZ GOWNX2

## (undated) DEVICE — PAD,ABDOMINAL,8"X7.5",STERILE,LF,1/PK: Brand: MEDLINE

## (undated) DEVICE — SOL NACL IRRIG 0.9% 1000ML BTL

## (undated) DEVICE — CANNULA 7MM W/LIP AR-6550

## (undated) DEVICE — SNARE OPTMZ PLPCTM TRP

## (undated) DEVICE — BATTERY

## (undated) DEVICE — EXPRESSEW III SUTURE NEEDLE FOR USE WITH EXPRESSEW II OR III SUTURE PASSER: Brand: EXPRESSEW

## (undated) DEVICE — LINE MNTR ADLT SET O2 INTMD

## (undated) DEVICE — DRAPE SRG 131X112X63IN GYN URO

## (undated) DEVICE — SUTURE VCRL SZ 2-0 L36IN ABSRB UD L36MM CT-1

## (undated) DEVICE — MINOR GENERAL: Brand: MEDLINE INDUSTRIES, INC.

## (undated) DEVICE — CANNULA 8.5MM TWIST AR-6530

## (undated) DEVICE — MEDI-VAC NON-CONDUCTIVE SUCTION TUBING: Brand: CARDINAL HEALTH

## (undated) NOTE — LETTER
12/14/2023              Robert Yin        [address]        [place] [address]         Dear Gavin Segura,    Our records indicate that the tests ordered for you by Coralee Phoenix, MD  have not been done. If you have, in fact, already completed the tests or you do not wish to have the tests done, please contact our office at [address] [place] [address]. Otherwise, please proceed with the testing. Enclosed is a duplicate order for your convenience.     To schedule a test at any [facility], call [facility] at [phone], Monday through Friday between 7:30am to 6pm and on Saturday between 8am and 1pm.   Imaging Order:    US LIVER (CPT=76705) (Order #[id number]) on 10/16/23           Sincerely,    Coralee Phoenix, MD  [facility], [facility], [facility] Roland Contreras [facility] [address]  [address] [facility] [id number] [phone]

## (undated) NOTE — LETTER
05/23/22        Robert Christianson  [address] [facility]      Dear Macey Morton,    Our records indicate that you have outstanding lab work and or testing that was ordered for you and has not yet been completed:   AFP, TUMOR MARKER, SERUM   HEPATIC FUNCTION PANEL (7)  Please call Central scheduling at [phone] to schedule this test order    To provide you with the best possible care, please complete these orders at your earliest convenience. If you have recently completed these orders please disregard this letter.      If you have any questions please call the office at [phone]    Thank you,   Huong Whitehead

## (undated) NOTE — LETTER
Suzy , [address] [facility],  [place]       01/23/21        Patient: Niles Meckel   YOB: 1970   Date of Visit: 1/23/2021       Dear  Dr. Ashley De La Cruz MD,      Thank you for referring Niles Meckel to my practice

## (undated) NOTE — LETTER
11/4/2024          Robert Yin    [address]    [facility] [address]         Dear Robert,    Our records indicate that the tests ordered for you by Fernando Whitehead MD  have not been done.  If you have, in fact, already completed the tests or you do not wish to have the tests done, please contact our office at THE NUMBER LISTED BELOW.  Otherwise, please proceed with the testing.  Enclosed is a duplicate order for your convenience.    Prothrombin Time (PT/INR) (Order #[id number]) on 8/8/24     Hepatic Function Panel (7) (Order #[id number]) on 8/8/24     AFP, Tumor Marker, Serum (Order #[id number]) on 8/8/24      LIVER (CPT=76705) (Order #[id number]) on 8/8/24     To schedule a test, call Central Scheduling at [phone]    Sincerely,    Fernando Whitehead MD  [facility]  [address] [facility] [address]  [facility] [id number] [phone]

## (undated) NOTE — LETTER
01/21/21        Robert FRAIRE Humphrey  [address]      Dear [facility] records indicate that you have outstanding lab work and or testing that was ordered for you and has not yet been completed:  Orders Placed This Encounter      Comp Metabolic Panel (14) [E]      Hepatitis A B + C profile [E]      Iron And Tibc [E]      Ceruloplasmin [E]      Pulmonary Referral - [place] ([address])      Cardio Referral - In Network      Gastro Referral - In Network    To provide you with the best possible care, please complete these orders at your earliest convenience. If you have recently completed these orders please disregard this letter. If you have any questions please call the office at Dept: [phone].      Thank you,       Adelaida Reyes MD

## (undated) NOTE — LETTER
[address], [place]  Authorization for Invasive Procedures  1. I hereby authorize Dr. Jim Burkett , my physician and whomever may be designated as the doctor's assistant, to perform the following operation and/or procedure:  Colonoscopy on Renetta Ruiz at [facility].  2. My physician has explained to me the nature and purpose of the operation or other procedure, possible alternative methods of treatment, the risks involved and the possibility of complications to me. I understand the probable consequences of declining the recommended procedure and the alternative methods of treatment. I acknowledge that no guarantee has been made as to the result that may be obtained. 3. I recognize that during the course of this operation or other procedure, unforeseen conditions may necessitate additional or different procedures than those listed above. I, therefore, further authorize and request that the above-named physician, his/her physician assistants, or designees perform such procedures as are, in his/her professional opinion, necessary and desirable. If I have a Do Not Attempt Resuscitation (DNAR) order in place, that status will be suspended while in the operating room, procedural suite, and during the recovery period unless otherwise explicitly stated by me (or a person authorized to consent on my behalf). The surgeon or my attending physician will determine when the applicable recovery period ends for purposes of reinstating the DNAR order. 4. Should the need arise during my operation or immediate post-operative period; I also consent to the administration of blood and/or blood products.  Further, I understand that despite careful testing and screening of blood and blood products, I may still be subject to ill effects as a result of recieving a blood transfusion an/or blood producst. The following are some, but not all, of the potential risks that can occur: fever and allergic reactions, hemolytic reactions, transmission of disease such as hepatitis, AIDS, cytomegalovirus (CMV), and flluid overload. In the event that I wish to have autologous transfusions of my own blood, or a directed donor transfusion, I will discuss this with my physician. 5. I consent to the photographing of the operations or procedures to be performed for the purposes of advancing medicine, science, and/or education, provided my identity is not revealed. If the procedure has been videotaped, the physician/surgeon will obtain the original videotape. The hospital will not be responsible for storage or maintenance of this tape. 6. I consent to the presence of a  or observer as deemed necessary by my physician or his designee. 7. Any tissues or organs removed in the operation or other procedure may be disposed of by and at the discretion of [facility].    8. I understand that the physician and his/her physician assistants may not be employees or agents of [facility], [facility], nor [facility], but are independent medical practitioners who have been permitted to use its facilities for the care and treatment of their patients. 9. Patients having a sterilization procedure: I understand that if the procedure is successful the results will be permanent and it will therefore be impossible for me to inseminate, conceive or bear children. I also understand that the procedure is intended to result in sterility, although the result has not been guaranteed. 10. I CERTIFY THAT I HAVE READ AND FULLY UNDERSTAND THE ABOVE CONSENT TO OPERATION and/or OTHER PROCEDURE. 11. I acknowledge that my physician has explained sedation/analgesia administration to me including the risks and benefits. I consent to the administration of sedation/analgesia as may be necessary or desirable in the judgment of my physician. Signature of Patient:  ________________________________________________ Date: _________Time: _________    Responsible person in case of minor or unconscious: _____________________________Relationship: ____________     Witness Signature: ____________________________________________ Date: __________ Time: ___________    Statement of Physician  My signature below affirms that prior to the time of the procedure, I have explained to the patient and/or his legal representative, the risks and benefits involved in the proposed treatment and any reasonable alternative to the proposed treatment. I have also explained the risks and benefits involved in the refusal of the proposed treatment and have answered the patient's questions. If I have a significant financial interest in this procedure/surgery, I have disclosed this and had a discussion with my patient.     Signature of Physician:   ________________________________________Date: _________Time:_______ Patient Name: Sukumar Curry  : 1970   Printed: 2022    Medical Record #: [id number]

## (undated) NOTE — LETTER
3/12/2024              Robert Yin        [address]        [facility] [address]         Dear Robert,    Our records indicate that the tests ordered for you by Fernando Whitehead MD  have not been done.  If you have, in fact, already completed the tests or you do not wish to have the tests done, please contact our office at THE NUMBER LISTED BELOW.  Otherwise, please proceed with the testing.   To schedule a test at any [facility] Facility, call Central Scheduling at [phone], Monday through Friday between 7:30am to 6pm and on Saturday between 8am and 1pm.   Evening and weekend appointments for your exam are available.   Orders Placed on 11/7/203  Lab order   Hepatic Function Panel (7)      Sincerely,    Fernando Whitehead MD  [facility]  [address]  [facility] [address]  [phone]

## (undated) NOTE — LETTER
AUTHORIZATION FOR SURGICAL OPERATION OR OTHER PROCEDURE    1. I hereby authorize Dr. Boston Zazueta and the [facility] staff assigned to my case to perform the following operation and/or procedure at the [facility]:    Ultrasound guided right biceps tendon injection with corticosteroid     2.  My physician has explained the nature and purpose of the operation or other procedure, possible alternative methods of treatment, the risks involved, and the possibility of complication to me.  I acknowledge that no guarantee has been made as to the result that may be obtained.  3.  I recognize that, during the course of this operation, or other procedure, unforseen conditions may necessitate additional or different procedure than those listed above.  I, therefore, further authorize and request that the above named physician, his/her physician assistants or designees perform such procedures as are, in his/her professional opinion, necessary and desirable.  4.  Any tissue or organs removed in the operation or other procedure may be disposed of by and at the discretion of the [facility] staff and [facility].  5.  I understand that in the event of a medical emergency, I will be transported by local paramedics to [facility] or other hospital emergency department.  6.  I certify that I have read and fully understand the above consent to operation and/or other procedure.    7.  I acknowledge that my physician has explained sedation/analgesia administration to me including the risks and benefits.  I consent to the administration of sedation/analgesia as may be necessary or desirable in the judgement of my physician.    Witness signature: ___________________________________________________ Date:  ______/______/_____                    Time:  ________ A.M.  P.M.       Patient Name:  Robert Yin  2/4/1970  [id number]         Patient signature:   ___________________________________________________                   Statement of Physician  My signature below affirms that prior to the time of the procedure, I have explained to the patient and/or his/her guardian, the risks and benefits involved in the proposed treatment and any reasonable alternative to the proposed treatment.  I have also explained the risks and benefits involved in the refusal of the proposed treatment and have answered the patient's questions.                        Date:  ______/______/_______  Provider                      Signature:  __________________________________________________________       Time:  ___________ AORLANDO FARRAR

## (undated) NOTE — LETTER
10/13/21        Robert Yin  [address] [facility] [place]      Dear Bessie Bunn,    Our records indicate that you have outstanding lab work and or testing that was ordered for you and has not yet been completed:    [address]

## (undated) NOTE — LETTER
9/30/2019                             To Whom It May Concern,    Anderson Morin is under Dr. Cheryle Burton care. He take alprazolam as needed for his sleep at nighttime only. If you have any questions, please feel free to contact me.        Sincerely,    DARCI Forte [address], [facility]  [address] [place] [id number] [phone]        Document electronically generated by:  César Hill

## (undated) NOTE — LETTER
10/9/2018              Robert Yin        [address]        [facility] [place] [address]               To Whom it may Concern,     Aidan Awad is under my care and has been seen in my office today 10/09/18.  Marianne Tran had a normal stress

## (undated) NOTE — LETTER
4/10/2021          To Whom It May Concern:    Walker eVronica is currently under my medical care and may not return to work at this time. Please excuse Marco Antonio Mccormick for work missed. .    If you require additional information please contact our office.

## (undated) NOTE — MR AVS SNAPSHOT
[place]  [address]  [phone]               Thank you for choosing us for your health care visit with Opal Reyes MD.  We are glad to serve you and happy to provide you with this arora PROAIR  (90 Base) MCG/ACT Aers   Generic drug:  Albuterol Sulfate HFA   INHALE 2 PUFFS INTO THE LUNGS EVERY 6 (SIX) HOURS AS NEEDED FOR WHEEZING. vitamin C 1000 MG Tabs   Take 1,000 mg by mouth daily.                    Today's Orders acquired. Please contact the Patient Business Office at [phone] if you have any questions related to insurance coverage. Thank you. Friday February 10, 2017     Imaging:  XR SHOULDER BILAT EM (CPT=73030)    Instructions:   To schedule a test at responsibility. If you have questions, please call your plans customer service number located on your ID card. To schedule Physical Therapy at any of the [facility] facilities, please call [phone].     [facility] Get your heart pumping – brisk walking, biking, swimming Even 10 minute increments are effective and add up over the week   2 ½ hours per week – spread out over time Use a marge to keep you motivated   Don’t forget strength training with weights and resist

## (undated) NOTE — LETTER
AUTHORIZATION FOR SURGICAL OPERATION OR OTHER PROCEDURE    1. I hereby authorize Dr. Boston Zazueta and the [facility] staff assigned to my case to perform the following operation and/or procedure at the [facility]:    Right subacromial injection    2.  My physician has explained the nature and purpose of the operation or other procedure, possible alternative methods of treatment, the risks involved, and the possibility of complication to me.  I acknowledge that no guarantee has been made as to the result that may be obtained.  3.  I recognize that, during the course of this operation, or other procedure, unforseen conditions may necessitate additional or different procedure than those listed above.  I, therefore, further authorize and request that the above named physician, his/her physician assistants or designees perform such procedures as are, in his/her professional opinion, necessary and desirable.  4.  Any tissue or organs removed in the operation or other procedure may be disposed of by and at the discretion of the [facility] staff and [facility].  5.  I understand that in the event of a medical emergency, I will be transported by local paramedics to [facility] or other hospital emergency department.  6.  I certify that I have read and fully understand the above consent to operation and/or other procedure.    7.  I acknowledge that my physician has explained sedation/analgesia administration to me including the risks and benefits.  I consent to the administration of sedation/analgesia as may be necessary or desirable in the judgement of my physician.    Witness signature: ___________________________________________________ Date:  ______/______/_____                    Time:  ________ A.M.  P.M.       Patient Name: Robert Yin  2/4/1970  [id number]       Patient signature:  ___________________________________________________               Statement of Physician  My  signature below affirms that prior to the time of the procedure, I have explained to the patient and/or his/her guardian, the risks and benefits involved in the proposed treatment and any reasonable alternative to the proposed treatment.  I have also explained the risks and benefits involved in the refusal of the proposed treatment and have answered the patient's questions.                        Date:  ______/______/_______  Provider                      Signature:  __________________________________________________________       Time:  ___________ A.M    P.M.

## (undated) NOTE — LETTER
8/2/2022              Robert Yin        [address]        [facility] [address]         Dear Linh Parada records indicate that the tests ordered for you by Mihaela Hopkins MD  have not been done. If you have, in fact, already completed the tests or you do not wish to have the tests done, please contact our office at [phone]. Otherwise, please proceed with the testing. Enclosed is a duplicate order for your convenience.      Lab and Imaging order:                 CBC WITH DIFFERENTIAL WITH PLATELET   COMP METABOLIC PANEL (14)   US LIVER (WFY=[id number])     Please call Central scheduling at [phone] to schedule this test order    Sincerely,    Mihaela Hopkins MD  [facility] [place], [place], [place]  [address] [place] [address]  [phone]

## (undated) NOTE — LETTER
AUTHORIZATION FOR SURGICAL OPERATION OR OTHER PROCEDURE    1.  I hereby authorize Dr. Miguel Farrell, and [facility] staff assigned to my case to perform the following operation and/or procedure at the [facility]:    ____________________________ TEJAL  P.M.        Patient Name:  ______________________________________________________  (please print)      Patient signature:  ___________________________________________________             Relationship to Patient:           []  Parent    Responsible per

## (undated) NOTE — LETTER
10/15/2020          To Whom It May Concern:    Som Cordova is currently under my medical care and may not return to { school/work:[id number]} at this time. Please excuse Leelee Cline for {NUMBERS 0-10:8702} {days weeks:3323::\"days\"}.   {HE/SHE :5280}

## (undated) NOTE — MR AVS SNAPSHOT
[address]  [phone]  [phone]               Thank you for choosing us for your health care visit with Hilary Larkin PT.   We are glad to serve you and happy to provide you with Please arrive at your scheduled appointment time. Wear comfortable, loose fitting clothing.             Mar 16, 2017  4:45 PM   [place] Physical Therapy Visit By Therapist with Lizzie Corona, PTA   [address] [place] [address]

## (undated) NOTE — LETTER
2/8/2021          To Whom It May Concern:    Bret Wallace is currently under my medical care and may not return to work at this time. Please excuse Lida Cheney starting from 01/25/21. If you require additional information please contact our office.

## (undated) NOTE — LETTER
Date: 2024      Patient Name: Robert Yin      : 1970        Thank you for choosing [facility] as your health care provider. Your physician has deemed the following medical service(s) necessary. However, your insurance plan may not pay for all of your health care and costs and may deny payment for this service. The fact that your insurance plan does not pay for an item or service does not mean you should not receive it. The purpose of this form is to help you make an informed decision about whether or not you want to receive this service(s) that may not be paid for by your insurance plan.    CPT Code Description     Cost     Ultrasound guided right biceps tendon injection with corticosteroid     _________ ______________________________ _____________      _________ ______________________________ _____________      I understand that the above mentioned service(s) or supply may not be covered by my insurance company. I agree to be financially responsible for the cost of this service or supply in the event of my insurance denies payment as a non-covered benefit.        ______________________________________________________________________  Signature of Patient or Patient's Representative  Relationship  Date    ______________________________________________________________________  Signature of Witness to signing of form   Printed Name

## (undated) NOTE — LETTER
12/26/2017              Robert Yin        [address] [facility]         To Whom It May Concern,    Phyllis Alexus is a pt under my care. He takes Alprazolam at night only as needed for sleep.  Please contact the off

## (undated) NOTE — LETTER
4/15/2021                 To Whom It May Concern,    Robert Yin(date of birth 02/04/1970) is a patient currently under my medical care.   He has been receiving physical therapy on 03/03/21 and 03/17/21 in our physical therapy department (therapist

## (undated) NOTE — LETTER
12/27/2017              Robert Yin        [address] [address]         Brandon Biggs is patient of [facility] since 2012. He is taking alprazolam as needed for sleep and     not during the day when he is driving.

## (undated) NOTE — LETTER
07/21/21        Robert Yin  [address] [facility]      Dear Link Keshia records indicate that you have outstanding lab work and or testing that was ordered for you and has not yet been completed:     [facility] ([address] [facility]

## (undated) NOTE — LETTER
11/30/2017              Robert Yin        [address] [address]         To Whom it may Concern,    Carole Crowe is under my care and has been seen in my office today 11/30/17.  Clemencia Ibrahim had a Normal stress test i

## (undated) NOTE — MR AVS SNAPSHOT
[address] [facility] [address] [address]  [phone]  [phone]               Thank you for choosing us for your health care visit with Natalia Mojica PTA.   We are glad to serve you and happy to provide you with Please arrive at your scheduled appointment time. Wear comfortable, loose fitting clothing.             Mar 20, 2017  5:15 PM   [place] Physical Therapy Visit By Therapist with Dheeraj Barraza, [address] [facility]

## (undated) NOTE — LETTER
4/10/2021          To Whom It May Concern:    Suleman Ohara is currently under my medical care and may not return to work at this time. Please excuse Seth Miller for work missed. If you require additional information please contact our office.

## (undated) NOTE — Clinical Note
2/8/2017              Robert Yin        [address] [facility]          Dear Lev Quevedo,    Your test results are enclosed. Call the office if you have questions.        MD Nate Mendoza [address],

## (undated) NOTE — IP AVS SNAPSHOT
[facility]    [address], [place], [place] ~ [phone]                Discharge Summary   5/2/2017    Suleman Ohara           Admission Information        Provider Department    5/2/2017 Viky Villeda MD [facility]      S · Shower in 24 hours but you must cover the wound with plastic to keep it dry for 48 hours. · Do not bathe or swim until sutures are removed. · Sutures will be removed at the first office visit    3.    Icing:  Apply ice packs to shoulder 3 to 4 times per surgery. Please call the Doctors office to schedule this appointment. [place]  [phone]   [place]  [phone]   [facility] [phone]    14.    Additional Instructions:  · SIGNS AND SYMPTOMS to report to your Doctor:  Persistent fever gre · If you had laparoscopic surgery, to feel shoulder pain or discomfort on the day of surgery. · For some patients to have nausea after surgery/anesthesia    If you feel nausea or experience vomiting:   · Try to move around less.    · Eat less than usual o you sleep, day or night. CPAP is a common device used to treat obstructive sleep apnea. ? Talk with your provider before taking any pain medicine, muscle relaxants, or sedatives.  Your provider will tell you about the possible dangers of taking these medic If you have sleep apnea, your body gets less oxygen when you sleep and you don't sleep well.   Common symptoms of sleep apnea are:  • Loud snoring interrupted with pauses in breathing followed by loud gasps  • Not feeling rested when you wake up in the morn tubes that fit snugly in the nose, or some other way may be used to get the air into your airways. Your sleep specialist will help you find the way that works best for you and will carefully supervise your use of the breathing machine.  Minor adjustments ma Proper weight control, exercise (according to your healthcare provider's recommendations),  good sleeping habits, not smoking, and avoiding excessive alcohol use will help you have general good health and may help prevent sleep apnea.     For more informati returning the survey you will receive in the mail. Thank you! Efe     Call the [email or web address] for assistance with your inactive [employer] account    If you have questions, you can call [phone] to talk to our [facility] Staff.  Remember, Christine

## (undated) NOTE — MR AVS SNAPSHOT
[facility] - [place] [address] [address] [id number] [phone]               Thank you for choosing us for your health care visit with Caitlyn Swenson MD.  We are glad to serve you and happy to provide you with this summary of yo PROAIR  (90 Base) MCG/ACT Aers   Generic drug:  Albuterol Sulfate HFA   INHALE 2 PUFFS INTO THE LUNGS EVERY 6 (SIX) HOURS AS NEEDED FOR WHEEZING. vitamin C 1000 MG Tabs   Take 1,000 mg by mouth daily.                 Where to Get Your Medi Rick Crespo    It is the patient's responsibility to check with and follow their insurance company's guidelines for prior authorization for this test.  You may be held responsible for payment in full if proper authorization is not acquired. Please contact t

## (undated) NOTE — LETTER
2/7/2017              [address] [place] [address]         Dear Marian Mclaughlin,      It was a pleasure to see you at our [facility] [address] [place] [address] office.   Your EKG test was normal.  There is

## (undated) NOTE — LETTER
Date: 2024      Patient Name: Robert Yin      : 1970        Thank you for choosing [facility] as your health care provider. Your physician has deemed the following medical service(s) necessary. However, your insurance plan may not pay for all of your health care and costs and may deny payment for this service. The fact that your insurance plan does not pay for an item or service does not mean you should not receive it. The purpose of this form is to help you make an informed decision about whether or not you want to receive this service(s) that may not be paid for by your insurance plan.    CPT Code Description     Cost     Right subacromial injection  $1200.00      I understand that the above mentioned service(s) or supply may not be covered by my insurance company. I agree to be financially responsible for the cost of this service or supply in the event of my insurance denies payment as a non-covered benefit.        ______________________________________________________________________  Signature of Patient or Patient's Representative  Relationship  Date    ______________________________________________________________________  Signature of Witness to signing of form   Printed Name

## (undated) NOTE — LETTER
[facility]  Administration of Anesthesia  1. I, Erlin Meza, or _________________________________ acting on his behalf, (Patient) (Dependent/Representative) request to receive anesthesia for my pending procedure/operation/treatment. A physician (anesthesiologist) alone or an anesthesiologist working with a nurse anesthetist may administer my anesthesia. 2. I understand that my anesthesiologist is not an employee or agent of the hospital, but is an independent medical practitioner who has been permitted to use its facilities for the care and treatment of his/her patients. 3. I acknowledge that a physician from [facility] or their designate(s), recommended anesthesia for me using her/his medical judgment. The type(s) of anesthesia I may receive include:                a) General Anesthesia, b) Spinal/Epidural Anesthesia, c) Regional Anesthesia or d) Monitored Anesthesia Care. 4. If my spinal, regional or monitored anesthesia care (local) is not satisfactory for my comfort, or if my medical condition requires, I consent to the administration of general anesthesia. 5. I am aware that the practice of anesthesiology is not an exact science and that some foreseeable risks or consequences may occur. Some common risks/consequences include sore throat and hoarseness, nausea and vomiting, muscle soreness, backache, damage to the mouth/teeth/vocal cords and eye injury. I understand that more rare but serious potential risks of anesthesia include blood pressure changes, drug reactions, cardiac arrest, brain damage, paralysis or death. These risks apply to whether I have general, spinal/epidural, regional or monitored anesthesia care. 6. OBSTETRIC PATIENTS: Specific risks/consequences of spinal/epidural anesthesia may include itching, low blood pressure, difficulty urinating, slowing of the baby's heart rate and headache.  Rare risks include infections, high spinal block, spinal bleeding, seizure, cardiac arrest and death. 7. AWARENESS: I understand that it is possible (but unlikely) to have explicit memory of events from the operating room while under general anesthesia. 8. ELECTROCONVULSIVE THERAPY PATIENTS: This consent serves for all treatments in a single course of therapy. 9. I understand that I must inform my anesthesiologist when I last ate and/or drank to minimize the risk of anesthesia. 10. If I am pregnant, or may pregnant, I understand that elective surgery should be postponed until after the baby is born. Anesthetics cross the placenta and may temporarily anesthetize the baby. Although fetal complications of anesthesia during pregnancy are rare, they may include birth defects, premature labor, brain damage and death. 11. I certify that I informed the anesthesiologist, to the best of my ability, about medication I take including blood thinners, anticoagulants, herbal remedies, narcotics and recreational drugs (e.g. cocaine, marijuana, PCP). Failure to inform my anesthesiologist about these medicines may increase my risk of anesthetic complications. The nature and purpose of my anesthetic management was explained to me. I had the opportunity to ask questions and the answers and information provided meet my satisfaction.   I retain the right to withdraw this consent at any time prior to the administration of said anesthetic.    ___________________________________________________           _____________________________________________________  Patient Signature                                                                                      Witness Signature                ___________________________________________________           _____________________________________________________  Date/Time                                                                                               Responsible person in case of minor/ unconscious pt /Relationship    My signature below affirms that prior to the time of the procedure, I have explained to the patient and/or his/her guardian, the risks and benefits of undergoing anesthesia, as well as any reasonable alternatives.     ___________________________________________________            _____________________________________________________  Physician Signature                            Date/Time  Patient Name: Jeremias Markham     : 1970     Printed: 2022      Medical Record #: [id number]                              Page 1 of 1    ----------ANESTHESIA CONSENT----------

## (undated) NOTE — LETTER
9/22/2020              Robert Yin        [address]        [facility] [address]         To Whom It May Concern,    Based on my assessment, Mr. Giovani Johnson is cleared from a cardiac standpoint to drive. He has not restrictions.      Sin

## (undated) NOTE — LETTER
4/15/2021          To Whom It May Concern:    Keke Bunn is currently under my medical care and may not return to work at this time. He has been treated for vertigo all this time and still not doing well.     Robert Yin(date of birth [birth year]

## (undated) NOTE — Clinical Note
Dear Dr. Esparza Expose    This letter is to inform you that Barrington Ma was seen 2x in PT at the [facility] and then did not return due to insurance coverage. Pt said he would be going elsewhere for PT due to insurance.   Pt seen for the initial evalua

## (undated) NOTE — LETTER
10/29/2022              Robert Yin        [address]        [facility] [facility] [address]         Dear Eric Plascencia,    Our records indicate that the tests ordered for you by Valentine Luna MD  have not been done. If you have, in fact, already completed the tests or you do not wish to have the tests done, please contact our office [phone]. Otherwise, please proceed with the testing. Enclosed is a duplicate order for your convenience. Imaging order: Please call Central Scheduling at [phone] to schedule this test order.                        US BIOPSY LIVER (CPT=[id number])      Sincerely,    Valentine Luna MD  [facility] [place], \[facility]  [address] [place] [address] [address] [address]  [phone]